# Patient Record
Sex: FEMALE | Race: WHITE | NOT HISPANIC OR LATINO | Employment: FULL TIME | URBAN - METROPOLITAN AREA
[De-identification: names, ages, dates, MRNs, and addresses within clinical notes are randomized per-mention and may not be internally consistent; named-entity substitution may affect disease eponyms.]

---

## 2017-02-16 ENCOUNTER — ALLSCRIPTS OFFICE VISIT (OUTPATIENT)
Dept: OTHER | Facility: OTHER | Age: 52
End: 2017-02-16

## 2017-02-16 DIAGNOSIS — M25.522 PAIN IN LEFT ELBOW: ICD-10-CM

## 2017-02-21 ENCOUNTER — HOSPITAL ENCOUNTER (OUTPATIENT)
Dept: RADIOLOGY | Facility: CLINIC | Age: 52
Discharge: HOME | End: 2017-02-21
Payer: COMMERCIAL

## 2017-02-21 ENCOUNTER — TRANSCRIBE ORDERS (OUTPATIENT)
Dept: URGENT CARE | Facility: CLINIC | Age: 52
End: 2017-02-21

## 2017-02-21 DIAGNOSIS — M25.522 PAIN IN LEFT ELBOW: ICD-10-CM

## 2017-02-21 PROCEDURE — 73030 X-RAY EXAM OF SHOULDER: CPT

## 2017-02-21 PROCEDURE — 73080 X-RAY EXAM OF ELBOW: CPT

## 2017-03-02 ENCOUNTER — ALLSCRIPTS OFFICE VISIT (OUTPATIENT)
Dept: OTHER | Facility: OTHER | Age: 52
End: 2017-03-02

## 2017-03-27 DIAGNOSIS — R06.2 WHEEZING: ICD-10-CM

## 2017-04-07 ENCOUNTER — ALLSCRIPTS OFFICE VISIT (OUTPATIENT)
Dept: OTHER | Facility: OTHER | Age: 52
End: 2017-04-07

## 2017-04-17 ENCOUNTER — HOSPITAL ENCOUNTER (OUTPATIENT)
Dept: RADIOLOGY | Facility: CLINIC | Age: 52
Discharge: HOME | End: 2017-04-17
Payer: COMMERCIAL

## 2017-04-17 ENCOUNTER — TRANSCRIBE ORDERS (OUTPATIENT)
Dept: URGENT CARE | Facility: CLINIC | Age: 52
End: 2017-04-17

## 2017-04-17 DIAGNOSIS — R06.2 WHEEZING: ICD-10-CM

## 2017-04-17 PROCEDURE — 71020 HB CHEST X-RAY 2VW FRONTAL&LATL: CPT

## 2017-10-04 ENCOUNTER — ALLSCRIPTS OFFICE VISIT (OUTPATIENT)
Dept: OTHER | Facility: OTHER | Age: 52
End: 2017-10-04

## 2017-10-05 NOTE — PROGRESS NOTES
Assessment  1  Acute maxillary sinusitis (461 0) (J01 00)    Plan  Acute maxillary sinusitis    · Azithromycin 250 MG Oral Tablet; TAKE 2 TABLETS ON DAY 1 THEN TAKE 1  TABLET A DAY FOR 4 DAYS    Discussion/Summary  The patient was counseled regarding diagnostic results,-instructions for management,-risk factor reductions,-prognosis,-patient and family education,-impressions,-risks and benefits of treatment options,-importance of compliance with treatment  Possible side effects of new medications were reviewed with the patient/guardian today  The treatment plan was reviewed with the patient/guardian  The patient/guardian understands and agrees with the treatment plan      Chief Complaint  congestion, cough and post nasal drip for 1 week, also complains of dizziness      History of Present Illness  HPI: pt complains of 1 week of coughing, sinus pressure, sneezing, productive cough with brown chunks, PND, and dizziness, pt has had ill contacts, pt has tried vicks which did help, pt denies itchy watery eyes, pt denies nausea vomiting or diarrhea, pt did not quit smoking      Review of Systems    Constitutional: fever-and-chills  Respiratory: no shortness of breath-and-no wheezing  Active Problems  1  Acute maxillary sinusitis (461 0) (J01 00)   2  Annual physical exam (V70 0) (Z00 00)   3  Left elbow pain (719 42) (M25 522)   4  Medial epicondylitis, left elbow (726 31) (M77 02)   5  Right shoulder pain (719 41) (M25 511)   6  Wheeze (786 07) (R06 2)    Family History  Father    1  Family history of myocardial infarction (V17 3) (Z82 49)    Social History   · Current every day smoker (305 1) (F17 200)   ·    · Social drinker (V49 89) (Z78 9)  The social history was reviewed and is unchanged  Current Meds   1  Ambien CR 12 5 MG Oral Tablet Extended Release; Therapy: (Recorded:[id number]) to Recorded   2  Lexapro 20 MG Oral Tablet; Therapy: (Recorded:[id number]) to Recorded    Allergies  1   No Known Drug Allergies    Vitals   Recorded: [id number] 09:23AM   Temperature 32 5 F   Systolic 781   Diastolic 82   Height 5 ft 6 in   Weight 141 lb    BMI Calculated 22 76   BSA Calculated 1 72     Physical Exam    Constitutional   General appearance: No acute distress, well appearing and well nourished  well developed,-acutely ill,-well nourished-and-well hydrated  Ears, Nose, Mouth, and Throat   External inspection of ears and nose: Normal     Otoscopic examination: Tympanic membranes translucent with normal light reflex  Canals patent without erythema  Nasal mucosa, septum, and turbinates: Abnormal   There was a purulent discharge from both nares  The bilateral nasal mucosa was boggy  Oropharynx: Abnormal  -cobblestone OP  Pulmonary   Respiratory effort: No increased work of breathing or signs of respiratory distress  Respiratory rate: normal  Assessment of respiratory effort revealed normal rhythm and effort  Auscultation of lungs: Clear to auscultation  no rales or crackles were heard bilaterally  no rhonchi  no friction rub  no wheezing  Cardiovascular   Auscultation of heart: Normal rate and rhythm, normal S1 and S2, without murmurs  The heart rate was normal  The rhythm was regular  Heart sounds: normal S1-and-normal S2  no murmurs were heard  Abdomen   Abdomen: Non-tender, no masses  The abdomen was flat  Bowel sounds were normal  The abdomen was soft and nontender  no masses palpated  The abdomen was normal to percussion  Lymphatic   Palpation of lymph nodes in neck: No lymphadenopathy  Skin   Skin and subcutaneous tissue: Normal without rashes or lesions      Psychiatric   Mood and affect: Normal          Signatures   Electronically signed by : Daniel Jefferson DO; Oct  4 2017  9:34AM EST                       (Author)

## 2017-11-15 ENCOUNTER — ALLSCRIPTS OFFICE VISIT (OUTPATIENT)
Dept: OTHER | Facility: OTHER | Age: 52
End: 2017-11-15

## 2017-11-15 DIAGNOSIS — Z12.11 ENCOUNTER FOR SCREENING FOR MALIGNANT NEOPLASM OF COLON: ICD-10-CM

## 2017-11-17 NOTE — PROGRESS NOTES
Assessment    1  Atypical mole (216 9) (D22 9)    Plan  Screening for colon cancer    · (1) OCCULT BLOOD, FECAL IMMUNOCHEMICAL TEST; Status:Active; Requestedfor:[id number];     Discussion/Summary    No atypical moles found all moles are much smaller than 0 5cm but pt insists on bx which we will schedule  The patient was counseled regarding diagnostic results,-- instructions for management,-- risk factor reductions,-- prognosis,-- patient and family education,-- impressions,-- risks and benefits of treatment options,-- importance of compliance with treatment  The treatment plan was reviewed with the patient/guardian  The patient/guardian understands and agrees with the treatment plan      Chief Complaint  Several spots would like to have looked at      History of Present Illness  pt complains of atypical moles on her back noticed about over a month ago, pt has a history of melenoma, and sees a dermatologist but they cannot get her in for 6 months      Review of Systems   Constitutional: no recent weight loss  Respiratory: no shortness of breath-- and-- no wheezing  Active Problems  1  Acute maxillary sinusitis (461 0) (J01 00)   2  Annual physical exam (V70 0) (Z00 00)   3  Left elbow pain (719 42) (M25 522)   4  Medial epicondylitis, left elbow (726 31) (M77 02)   5  Right shoulder pain (719 41) (M25 511)   6  Wheeze (786 07) (R06 2)    Family History  Father    1  Family history of myocardial infarction (V17 3) (Z82 49)    Social History     · Current every day smoker (305 1) (F17 200)   ·    · Social drinker (V49 89) (Z78 9)  The social history was reviewed and is unchanged  Current Meds   1  Ambien CR 12 5 MG Oral Tablet Extended Release; Therapy: (Recorded:[id number]) to Recorded   2  Lexapro 20 MG Oral Tablet; Therapy: (Recorded:[id number]) to Recorded    The medication list was reviewed and updated today  Allergies  1   No Known Drug Allergies    Vitals  Vital Signs    Recorded: 27GRL6494 09:32AM   Temperature 98 2 F   Heart Rate 94   Systolic 017   Diastolic 92   Height 5 ft 6 in   Weight 151 lb    BMI Calculated 24 37   BSA Calculated 1 77       Physical Exam   Constitutional  General appearance: No acute distress, well appearing and well nourished  well developed,-- appears healthy,-- well nourished-- and-- well hydrated  Pulmonary  Respiratory effort: No increased work of breathing or signs of respiratory distress  Respiratory rate: normal  Assessment of respiratory effort revealed normal rhythm and effort  Auscultation of lungs: Clear to auscultation  no rales or crackles were heard bilaterally  no rhonchi  no friction rub  no wheezing  Cardiovascular  Auscultation of heart: Normal rate and rhythm, normal S1 and S2, without murmurs  The heart rate was normal  The rhythm was regular  Heart sounds: normal S1-- and-- normal S2  no murmurs were heard  Abdomen  Abdomen: Non-tender, no masses  Lymphatic  Palpation of lymph nodes in neck: No lymphadenopathy  Skin  Skin and subcutaneous tissue: Normal without rashes or lesions     Psychiatric  Mood and affect: Normal          Future Appointments    Date/Time Provider Specialty Site   12/13/2017 09:00 AM Gemini Poon DO Family Medicine [facility] 10       Signatures   Electronically signed by : Antoni Cooper DO; Nov 16 2017  4:55PM EST                       (Author)

## 2018-01-13 VITALS
DIASTOLIC BLOOD PRESSURE: 80 MMHG | HEIGHT: 66 IN | WEIGHT: 144.2 LBS | BODY MASS INDEX: 23.18 KG/M2 | TEMPERATURE: 97 F | SYSTOLIC BLOOD PRESSURE: 132 MMHG

## 2018-01-13 VITALS
SYSTOLIC BLOOD PRESSURE: 140 MMHG | WEIGHT: 141 LBS | TEMPERATURE: 96.9 F | BODY MASS INDEX: 22.66 KG/M2 | HEIGHT: 66 IN | DIASTOLIC BLOOD PRESSURE: 82 MMHG

## 2018-01-14 VITALS
DIASTOLIC BLOOD PRESSURE: 68 MMHG | TEMPERATURE: 97.4 F | SYSTOLIC BLOOD PRESSURE: 122 MMHG | WEIGHT: 143 LBS | BODY MASS INDEX: 22.98 KG/M2 | HEIGHT: 66 IN

## 2018-01-14 VITALS
SYSTOLIC BLOOD PRESSURE: 135 MMHG | HEART RATE: 94 BPM | BODY MASS INDEX: 24.27 KG/M2 | WEIGHT: 151 LBS | TEMPERATURE: 98.2 F | HEIGHT: 66 IN | DIASTOLIC BLOOD PRESSURE: 92 MMHG

## 2018-03-12 DIAGNOSIS — J01.00 ACUTE NON-RECURRENT MAXILLARY SINUSITIS: Primary | ICD-10-CM

## 2018-03-12 RX ORDER — AZITHROMYCIN 500 MG/1
TABLET, FILM COATED ORAL
Qty: 6 TABLET | Refills: 0 | Status: SHIPPED | OUTPATIENT
Start: 2018-03-12 | End: 2018-03-17

## 2018-09-27 ENCOUNTER — OFFICE VISIT (OUTPATIENT)
Dept: FAMILY MEDICINE CLINIC | Facility: CLINIC | Age: 53
End: 2018-09-27
Payer: COMMERCIAL

## 2018-09-27 VITALS
SYSTOLIC BLOOD PRESSURE: 126 MMHG | BODY MASS INDEX: 25.58 KG/M2 | HEIGHT: 67 IN | TEMPERATURE: 96.5 F | HEART RATE: 85 BPM | OXYGEN SATURATION: 95 % | WEIGHT: 163 LBS | RESPIRATION RATE: 16 BRPM | DIASTOLIC BLOOD PRESSURE: 80 MMHG

## 2018-09-27 DIAGNOSIS — Z13.6 SCREENING FOR ISCHEMIC HEART DISEASE: ICD-10-CM

## 2018-09-27 DIAGNOSIS — F32.A DEPRESSIVE DISORDER: ICD-10-CM

## 2018-09-27 DIAGNOSIS — J01.01 ACUTE RECURRENT MAXILLARY SINUSITIS: Primary | ICD-10-CM

## 2018-09-27 DIAGNOSIS — Z13.0 SCREENING FOR IRON DEFICIENCY ANEMIA: ICD-10-CM

## 2018-09-27 DIAGNOSIS — Z09 EXAMINATION FOR, FOLLOW-UP: ICD-10-CM

## 2018-09-27 DIAGNOSIS — Z13.220 SCREENING FOR LIPOID DISORDERS: ICD-10-CM

## 2018-09-27 DIAGNOSIS — F32.A DEPRESSION, UNSPECIFIED DEPRESSION TYPE: Primary | ICD-10-CM

## 2018-09-27 DIAGNOSIS — G47.09 OTHER INSOMNIA: ICD-10-CM

## 2018-09-27 PROBLEM — J01.90 ACUTE SINUSITIS: Status: ACTIVE | Noted: 2018-09-27

## 2018-09-27 PROBLEM — M77.02 MEDIAL EPICONDYLITIS, LEFT ELBOW: Status: ACTIVE | Noted: 2017-02-16

## 2018-09-27 PROCEDURE — 3008F BODY MASS INDEX DOCD: CPT | Performed by: FAMILY MEDICINE

## 2018-09-27 PROCEDURE — 99213 OFFICE O/P EST LOW 20 MIN: CPT | Performed by: FAMILY MEDICINE

## 2018-09-27 RX ORDER — AZITHROMYCIN 250 MG/1
TABLET, FILM COATED ORAL
Qty: 6 TABLET | Refills: 0 | Status: SHIPPED | OUTPATIENT
Start: 2018-09-27 | End: 2018-10-02

## 2018-09-27 RX ORDER — ESCITALOPRAM OXALATE 20 MG/1
20 TABLET ORAL DAILY
Qty: 90 TABLET | Refills: 0 | Status: CANCELLED | OUTPATIENT
Start: 2018-09-27

## 2018-09-27 RX ORDER — FLUTICASONE PROPIONATE 50 MCG
2 SPRAY, SUSPENSION (ML) NASAL DAILY
Qty: 16 G | Refills: 0 | Status: SHIPPED | OUTPATIENT
Start: 2018-09-27 | End: 2018-10-24 | Stop reason: SDUPTHER

## 2018-09-27 RX ORDER — MECLIZINE HYDROCHLORIDE 25 MG/1
TABLET ORAL AS NEEDED
COMMUNITY
Start: 2015-03-10 | End: 2019-01-24 | Stop reason: ALTCHOICE

## 2018-09-27 RX ORDER — ESCITALOPRAM OXALATE 20 MG/1
20 TABLET ORAL DAILY
Qty: 90 TABLET | Refills: 0 | Status: SHIPPED | OUTPATIENT
Start: 2018-09-27 | End: 2018-12-27 | Stop reason: SDUPTHER

## 2018-09-27 RX ORDER — ZOLPIDEM TARTRATE 12.5 MG/1
TABLET, FILM COATED, EXTENDED RELEASE ORAL
COMMUNITY
Start: 2017-05-24 | End: 2018-09-27 | Stop reason: SDUPTHER

## 2018-09-27 RX ORDER — ZOLPIDEM TARTRATE 12.5 MG/1
12.5 TABLET, FILM COATED, EXTENDED RELEASE ORAL
Qty: 30 TABLET | Refills: 0 | Status: SHIPPED | OUTPATIENT
Start: 2018-09-27 | End: 2019-01-24 | Stop reason: ALTCHOICE

## 2018-09-27 RX ORDER — ESCITALOPRAM OXALATE 20 MG/1
20 TABLET ORAL DAILY
COMMUNITY
Start: 2018-03-05 | End: 2018-09-27 | Stop reason: SDUPTHER

## 2018-09-27 RX ORDER — TERBINAFINE HYDROCHLORIDE 250 MG/1
TABLET ORAL EVERY 24 HOURS
COMMUNITY
Start: 2018-04-03 | End: 2019-10-17 | Stop reason: SDUPTHER

## 2018-09-27 NOTE — ASSESSMENT & PLAN NOTE
Acute we discussed supportive treatment with the patient and than 90% of sinus infection is viral if patient symptom has been going on for more than 2 week plan to start her on Zithromax as directed on the pack  Rinse nose and sinuses with salt few times a day  Take over-the-counter med pain relief for  Use the congestion noted spray

## 2018-09-27 NOTE — PROGRESS NOTES
Assessment/Plan:    Acute sinusitis  Acute we discussed supportive treatment with the patient and than 90% of sinus infection is viral if patient symptom has been going on for more than 2 week plan to start her on Zithromax as directed on the pack  Rinse nose and sinuses with salt few times a day  Take over-the-counter med pain relief for  Use the congestion noted spray       Diagnoses and all orders for this visit:    Acute recurrent maxillary sinusitis  -     azithromycin (ZITHROMAX) 250 mg tablet; Take 2 tablets 1st day for 1 tablet daily for 4 the  -     fluticasone (FLONASE) 50 mcg/act nasal spray; 2 sprays into each nostril daily for 14 days    Other insomnia  -     zolpidem (AMBIEN CR) 12 5 MG CR tablet; Take 1 tablet (12 5 mg total) by mouth daily at bedtime as needed for sleep    Depressive disorder  -     escitalopram (LEXAPRO) 20 mg tablet; Take 1 tablet (20 mg total) by mouth daily    Screening for lipoid disorders  -     CBC and differential; Future  -     Comprehensive metabolic panel; Future  -     Lipid panel; Future  -     TSH baseline; Future    Screening for ischemic heart disease  -     CBC and differential; Future  -     Comprehensive metabolic panel; Future  -     Lipid panel; Future  -     TSH baseline; Future    Screening for iron deficiency anemia  -     CBC and differential; Future  -     Comprehensive metabolic panel; Future  -     Lipid panel; Future  -     TSH baseline; Future    Examination for, follow-up  -     CBC and differential; Future  -     Comprehensive metabolic panel; Future  -     Lipid panel; Future  -     TSH baseline;  Future    Other orders  -     meclizine (ANTIVERT) 25 mg tablet; as needed  -     Discontinue: zolpidem (AMBIEN CR) 12 5 MG CR tablet; take 1 tablet (12 5MG)  by oral route  every day at bedtime  -     terbinafine (LAMISIL) 250 mg tablet; every 24 hours          Subjective:   Chief Complaint   Patient presents with    Cold Like Symptoms     x11 days Patient ID: Ping Guzman is a 48 y o  female  Patient here concerned about the sinus pressure facial pain and headache congestion postnasal drip the tired fatigue the chills subjective fever it has been going on for almost 2 week patient try over-the-counter medication for anti congestion she has been increasing her fluid intake but is not helping patient is a smoker        The following portions of the patient's history were reviewed and updated as appropriate: allergies, current medications, past family history, past medical history, past social history, past surgical history and problem list     Review of Systems   Constitutional: Negative for fatigue and fever  HENT: Positive for sinus pain and sinus pressure  Negative for ear pain and sore throat  Eyes: Negative for pain and redness  Respiratory: Negative for cough, chest tightness and shortness of breath  Cardiovascular: Negative for chest pain, palpitations and leg swelling  Gastrointestinal: Negative for abdominal pain, blood in stool, constipation, diarrhea and nausea  Genitourinary: Negative for flank pain, frequency and hematuria  Musculoskeletal: Negative for back pain and joint swelling  Skin: Negative for rash  Neurological: Negative for dizziness, numbness and headaches  Hematological: Does not bruise/bleed easily  Objective:  Vitals:    09/27/18 1334   BP: 126/80   BP Location: Left arm   Patient Position: Sitting   Cuff Size: Standard   Pulse: 85   Resp: 16   Temp: (!) 96 5 °F (35 8 °C)   TempSrc: Oral   SpO2: 95%   Weight: 73 9 kg (163 lb)   Height: 5' 6 5" (1 689 m)      Physical Exam   Constitutional: She is oriented to person, place, and time  She appears well-developed and well-nourished  HENT:   Head: Normocephalic  Right Ear: External ear normal    Left Ear: External ear normal    Eyes: Conjunctivae and EOM are normal  Right eye exhibits no discharge  Left eye exhibits no discharge  Neck: No JVD present  Cardiovascular: Normal rate, regular rhythm and normal heart sounds  Exam reveals no gallop  No murmur heard  Pulmonary/Chest: Effort normal  No respiratory distress  She has no rales  She exhibits no tenderness  Abdominal: She exhibits no mass  There is no tenderness  There is no rebound  Musculoskeletal: She exhibits no edema or tenderness  Neurological: She is alert and oriented to person, place, and time  Skin: No rash noted  No erythema

## 2018-10-01 ENCOUNTER — TELEPHONE (OUTPATIENT)
Dept: FAMILY MEDICINE CLINIC | Facility: CLINIC | Age: 53
End: 2018-10-01

## 2018-10-01 DIAGNOSIS — R06.02 SOB (SHORTNESS OF BREATH): Primary | ICD-10-CM

## 2018-10-01 RX ORDER — ALBUTEROL SULFATE 90 UG/1
2 AEROSOL, METERED RESPIRATORY (INHALATION) EVERY 6 HOURS PRN
Qty: 18 G | Refills: 1 | OUTPATIENT
Start: 2018-10-01 | End: 2019-02-26 | Stop reason: SDUPTHER

## 2018-10-01 NOTE — TELEPHONE ENCOUNTER
Incoming voicemail 805 am on nurses line patient seen on Thursday given antibiotic which she finished there is no improvement still wheezng congested and SOB tried mucinex and robatussin requesting inhaler or cough medicine please advise

## 2018-10-01 NOTE — TELEPHONE ENCOUNTER
Spoke to patient called in rx for ventolin inhaler to pharmacy in [place] spoke to [facility]

## 2018-10-17 ENCOUNTER — APPOINTMENT (OUTPATIENT)
Dept: LAB | Facility: HOSPITAL | Age: 53
End: 2018-10-17
Payer: COMMERCIAL

## 2018-10-17 DIAGNOSIS — Z13.6 SCREENING FOR ISCHEMIC HEART DISEASE: ICD-10-CM

## 2018-10-17 DIAGNOSIS — Z13.220 SCREENING FOR LIPOID DISORDERS: ICD-10-CM

## 2018-10-17 DIAGNOSIS — Z13.0 SCREENING FOR IRON DEFICIENCY ANEMIA: ICD-10-CM

## 2018-10-17 DIAGNOSIS — Z09 EXAMINATION FOR, FOLLOW-UP: ICD-10-CM

## 2018-10-17 LAB
ALBUMIN SERPL BCP-MCNC: 4 G/DL (ref 3.5–5.7)
ALP SERPL-CCNC: 50 U/L (ref 40–150)
ALT SERPL W P-5'-P-CCNC: 14 U/L (ref 7–52)
ANION GAP SERPL CALCULATED.3IONS-SCNC: 6 MMOL/L (ref 4–13)
AST SERPL W P-5'-P-CCNC: 15 U/L (ref 13–39)
BASOPHILS # BLD AUTO: 0.1 THOUSANDS/ΜL (ref 0–0.1)
BASOPHILS NFR BLD AUTO: 1 % (ref 0–2)
BILIRUB SERPL-MCNC: 0.2 MG/DL (ref 0.2–1)
BUN SERPL-MCNC: 20 MG/DL (ref 7–25)
CALCIUM SERPL-MCNC: 9 MG/DL (ref 8.6–10.5)
CHLORIDE SERPL-SCNC: 107 MMOL/L (ref 98–107)
CHOLEST SERPL-MCNC: 161 MG/DL (ref 0–200)
CO2 SERPL-SCNC: 27 MMOL/L (ref 21–31)
CREAT SERPL-MCNC: 0.69 MG/DL (ref 0.6–1.2)
EOSINOPHIL # BLD AUTO: 0.3 THOUSAND/ΜL (ref 0–0.61)
EOSINOPHIL NFR BLD AUTO: 5 % (ref 0–5)
ERYTHROCYTE [DISTWIDTH] IN BLOOD BY AUTOMATED COUNT: 13.1 % (ref 11.5–14.5)
GFR SERPL CREATININE-BSD FRML MDRD: 101 ML/MIN/1.73SQ M
GLUCOSE P FAST SERPL-MCNC: 93 MG/DL (ref 65–99)
HCT VFR BLD AUTO: 42.8 % (ref 34.8–46.1)
HDLC SERPL-MCNC: 50 MG/DL (ref 40–60)
HGB BLD-MCNC: 13.8 G/DL (ref 12–16)
LDLC SERPL CALC-MCNC: 93 MG/DL (ref 75–193)
LYMPHOCYTES # BLD AUTO: 2 THOUSANDS/ΜL (ref 0.6–4.47)
LYMPHOCYTES NFR BLD AUTO: 32 % (ref 21–51)
MCH RBC QN AUTO: 29.4 PG (ref 26–34)
MCHC RBC AUTO-ENTMCNC: 32.2 G/DL (ref 31–37)
MCV RBC AUTO: 91 FL (ref 81–99)
MONOCYTES # BLD AUTO: 0.4 THOUSAND/ΜL (ref 0.17–1.22)
MONOCYTES NFR BLD AUTO: 7 % (ref 2–12)
NEUTROPHILS # BLD AUTO: 3.5 THOUSANDS/ΜL (ref 1.4–6.5)
NEUTS SEG NFR BLD AUTO: 55 % (ref 42–75)
NONHDLC SERPL-MCNC: 111 MG/DL
NRBC BLD AUTO-RTO: 0 /100 WBCS
PLATELET # BLD AUTO: 288 THOUSANDS/UL (ref 149–390)
PMV BLD AUTO: 9.5 FL (ref 8.6–11.7)
POTASSIUM SERPL-SCNC: 4.1 MMOL/L (ref 3.5–5.5)
PROT SERPL-MCNC: 6.2 G/DL (ref 6.4–8.9)
RBC # BLD AUTO: 4.7 MILLION/UL (ref 3.9–5.2)
SODIUM SERPL-SCNC: 140 MMOL/L (ref 134–143)
TRIGL SERPL-MCNC: 88 MG/DL (ref 44–166)
TSH SERPL DL<=0.05 MIU/L-ACNC: 2.72 UIU/ML (ref 0.45–5.33)
WBC # BLD AUTO: 6.4 THOUSAND/UL (ref 4.8–10.8)

## 2018-10-17 PROCEDURE — 36415 COLL VENOUS BLD VENIPUNCTURE: CPT

## 2018-10-17 PROCEDURE — 85025 COMPLETE CBC W/AUTO DIFF WBC: CPT

## 2018-10-17 PROCEDURE — 80061 LIPID PANEL: CPT

## 2018-10-17 PROCEDURE — 80053 COMPREHEN METABOLIC PANEL: CPT

## 2018-10-17 PROCEDURE — 84443 ASSAY THYROID STIM HORMONE: CPT

## 2018-10-23 ENCOUNTER — OFFICE VISIT (OUTPATIENT)
Dept: FAMILY MEDICINE CLINIC | Facility: CLINIC | Age: 53
End: 2018-10-23
Payer: COMMERCIAL

## 2018-10-23 VITALS
WEIGHT: 165 LBS | SYSTOLIC BLOOD PRESSURE: 160 MMHG | BODY MASS INDEX: 26.52 KG/M2 | DIASTOLIC BLOOD PRESSURE: 100 MMHG | OXYGEN SATURATION: 90 % | TEMPERATURE: 97.9 F | HEIGHT: 66 IN | HEART RATE: 62 BPM

## 2018-10-23 DIAGNOSIS — Z12.31 ENCOUNTER FOR MAMMOGRAM TO ESTABLISH BASELINE MAMMOGRAM: ICD-10-CM

## 2018-10-23 DIAGNOSIS — Z01.419 CERVICAL SMEAR, AS PART OF ROUTINE GYNECOLOGICAL EXAMINATION: Primary | ICD-10-CM

## 2018-10-23 DIAGNOSIS — R03.0 ELEVATED BP WITHOUT DIAGNOSIS OF HYPERTENSION: ICD-10-CM

## 2018-10-23 DIAGNOSIS — Z23 NEED FOR INFLUENZA VACCINATION: ICD-10-CM

## 2018-10-23 PROCEDURE — 99396 PREV VISIT EST AGE 40-64: CPT | Performed by: FAMILY MEDICINE

## 2018-10-23 PROCEDURE — 87624 HPV HI-RISK TYP POOLED RSLT: CPT | Performed by: FAMILY MEDICINE

## 2018-10-23 PROCEDURE — G0145 SCR C/V CYTO,THINLAYER,RESCR: HCPCS | Performed by: FAMILY MEDICINE

## 2018-10-23 RX ORDER — VALACYCLOVIR HYDROCHLORIDE 1 G/1
TABLET, FILM COATED ORAL
Refills: 3 | COMMUNITY
Start: 2018-10-16

## 2018-10-23 NOTE — PROGRESS NOTES
Blas Espinal He is a 48 y o  female here for a routine gynecologic exam       Current complaints:   Patient his here for her annual Pap smear the per patient she is in no sexual active currently and she did have a Pap smear on 2014 and it was normal     Gynecologic History  Patient's last menstrual period was 10/12/2018  Contraception: none  Last Pap: 12/4/14  Results were: normal  Last mammogram: 11/13/13  Results were: normal  Last colonoscopy: never  And she declined for now she says she will think about it    Obstetric History  OB History        The following portions of the patient's history were reviewed and updated as appropriate: allergies, current medications, past family history, past medical history, past social history, past surgical history and problem list     Review of Systems   Constitutional: Negative for fatigue and fever  HENT: Negative for ear pain, sinus pain, sinus pressure and sore throat  Eyes: Negative for pain and redness  Respiratory: Negative for cough, chest tightness and shortness of breath  Cardiovascular: Negative for chest pain, palpitations and leg swelling  Gastrointestinal: Negative for abdominal pain, blood in stool, constipation, diarrhea and nausea  Genitourinary: Negative for decreased urine volume, flank pain, frequency, hematuria, pelvic pain, vaginal bleeding, vaginal discharge and vaginal pain  Musculoskeletal: Negative for back pain and joint swelling  Skin: Negative for rash  Neurological: Negative for dizziness, numbness and headaches  Hematological: Does not bruise/bleed easily  Objective    Physical Exam   Constitutional: She is oriented to person, place, and time  She appears well-developed  HENT:   Right Ear: External ear normal    Left Ear: External ear normal    Eyes: Pupils are equal, round, and reactive to light  EOM are normal    Neck: No thyromegaly present     Cardiovascular: Normal rate, regular rhythm and normal heart sounds  Exam reveals no friction rub  No murmur heard  Pulmonary/Chest: She has no wheezes  She has no rales  She exhibits no tenderness  Abdominal: She exhibits no distension and no mass  There is no tenderness  No hernia  Hernia confirmed negative in the right inguinal area and confirmed negative in the left inguinal area  Genitourinary: There is no rash on the right labia  There is no rash on the left labia  Cervix exhibits no motion tenderness  Right adnexum displays no mass and no tenderness  Left adnexum displays no mass and no tenderness  No tenderness or bleeding in the vagina  No foreign body in the vagina  No vaginal discharge found  Musculoskeletal: Normal range of motion  She exhibits no edema  Lymphadenopathy:        Right: No inguinal adenopathy present  Left: No inguinal adenopathy present  Neurological: She is alert and oriented to person, place, and time  Skin: Skin is warm  Assessment     Healthy female exam       Plan  Encounter Diagnosis     ICD-10-CM    1  Cervical smear, as part of routine gynecological examination Z01 419 Liquid-based pap, screening     CANCELED: Thinprep Pap   2  Encounter for mammogram to establish baseline mammogram Z12 31 Mammo screening bilateral w cad   3  Elevated BP without diagnosis of hypertension R03 0    4  Need for influenza vaccination Z23 influenza vaccine, 2065-2802, quadrivalent, recombinant, PF, 0 5 mL, for patients 18-49 yr with comorbidities (FLUBLOK)      Education reviewed: calcium supplements, depression evaluation, low fat, low cholesterol diet and smoking cessation

## 2018-10-23 NOTE — ASSESSMENT & PLAN NOTE
Elevated blood pressure asymptomatic patient did smoke before come to the office we discussed with the patient low-salt diet will recheck blood pressure if persistent to be high will start her on medication

## 2018-10-24 DIAGNOSIS — J01.01 ACUTE RECURRENT MAXILLARY SINUSITIS: ICD-10-CM

## 2018-10-25 LAB
HPV HR 12 DNA CVX QL NAA+PROBE: NEGATIVE
HPV16 DNA CVX QL NAA+PROBE: NEGATIVE
HPV18 DNA CVX QL NAA+PROBE: NEGATIVE

## 2018-10-26 RX ORDER — FLUTICASONE PROPIONATE 50 MCG
2 SPRAY, SUSPENSION (ML) NASAL DAILY
Qty: 16 G | Refills: 0 | Status: SHIPPED | OUTPATIENT
Start: 2018-10-26 | End: 2020-07-31 | Stop reason: ALTCHOICE

## 2018-10-30 ENCOUNTER — OFFICE VISIT (OUTPATIENT)
Dept: FAMILY MEDICINE CLINIC | Facility: CLINIC | Age: 53
End: 2018-10-30
Payer: COMMERCIAL

## 2018-10-30 VITALS
WEIGHT: 168.8 LBS | HEIGHT: 66 IN | SYSTOLIC BLOOD PRESSURE: 142 MMHG | BODY MASS INDEX: 27.13 KG/M2 | DIASTOLIC BLOOD PRESSURE: 88 MMHG

## 2018-10-30 DIAGNOSIS — R03.0 ELEVATED BP WITHOUT DIAGNOSIS OF HYPERTENSION: ICD-10-CM

## 2018-10-30 DIAGNOSIS — C43.59 MALIGNANT MELANOMA OF SKIN OF TRUNK (HCC): ICD-10-CM

## 2018-10-30 DIAGNOSIS — Z12.39 SCREENING FOR BREAST CANCER: ICD-10-CM

## 2018-10-30 DIAGNOSIS — F32.9 MAJOR DEPRESSIVE DISORDER WITH SINGLE EPISODE, REMISSION STATUS UNSPECIFIED: Primary | ICD-10-CM

## 2018-10-30 DIAGNOSIS — R06.2 WHEEZING: ICD-10-CM

## 2018-10-30 LAB
LAB AP GYN PRIMARY INTERPRETATION: NORMAL
LAB AP LMP: NORMAL
Lab: NORMAL

## 2018-10-30 PROCEDURE — 99214 OFFICE O/P EST MOD 30 MIN: CPT | Performed by: NURSE PRACTITIONER

## 2018-10-30 RX ORDER — FLUTICASONE FUROATE AND VILANTEROL 100; 25 UG/1; UG/1
1 POWDER RESPIRATORY (INHALATION) DAILY
Qty: 1 INHALER | Refills: 1 | Status: SHIPPED | COMMUNITY
Start: 2018-10-30 | End: 2020-12-08

## 2018-10-30 RX ORDER — METHYLPREDNISOLONE 4 MG/1
TABLET ORAL
Qty: 21 TABLET | Refills: 0 | Status: SHIPPED | OUTPATIENT
Start: 2018-10-30 | End: 2018-11-27 | Stop reason: ALTCHOICE

## 2018-10-30 NOTE — PROGRESS NOTES
Assessment/Plan:    No problem-specific Assessment & Plan notes found for this encounter  Diagnoses and all orders for this visit:    Major depressive disorder with single episode, remission status unspecified  Comments:  Pt using Lexapro with satisfaction    Elevated BP without diagnosis of hypertension  Comments:  bp 142/88 today no medication at this time    Screening for breast cancer  Comments:  mammogram ordered  Orders:  -     Mammo screening bilateral w cad; Future    Malignant melanoma of skin of trunk (HCC)  Comments:  pt follows with [facility]- had full body scan 10/18    Wheezing  Comments:  Rx for Medrol dose pack and Breo provided pt already has the Albuterol  Orders:  -     Methylprednisolone 4 MG TBPK; Use as directed on package  -     fluticasone-vilanterol (BREO ELLIPTA) 100-25 mcg/inh inhaler; Inhale 1 puff daily Rinse mouth after use  Subjective:      Patient ID: Rolando Lares is a 48 y o  female here for lab review     48 yr old female here for review of labwork and pap smear results  Pt is re-establishing care at this office  Pt reports her only complaint today is generalized wheezing and loose non productive cough since September 2018  Pt denies fever but admittedly smokes 1 ppd  Labwork and pap exam  reviewed  no abnormalities noted  Pt is also requesting testing for ADHD at a future time  Pt reports she has so much difficulty concentrating and remaining calm especially at time of sleep  Pt has tried multiple sleeping agents and still has insomnia  Pt would like to be tested for ADHD, we discussed need for subsequent appt  The following portions of the patient's history were reviewed and updated as appropriate:   She  has a past medical history of Anxiety (5/31/2013); Arthritis; Cancer ([facility] [address] ); Herpes simplex; IBS (irritable bowel syndrome); Insomnia; Malignant melanoma in situ ([facility] [address] ); and Reactive airway disease    She   Patient Active Problem List Diagnosis Date Noted    Elevated BP without diagnosis of hypertension 10/23/2018    Acute sinusitis 09/27/2018    Medial epicondylitis, left elbow 02/16/2017    Major depression, single episode 08/11/2016    Constipation 11/03/2014    Depressive disorder 11/03/2014    Malignant melanoma of skin of trunk ([facility] [address] ) 11/03/2014    Insomnia 05/31/2013    Irritable bowel syndrome 05/31/2013    Anxiety 05/31/2013     She  has a past surgical history that includes Skin lesion excision (Right, 2013); AUGMENTATION BREAST; and Skin biopsy (2013)  Her family history includes Diabetes in her other; Heart disease in her father; Hyperlipidemia in her other  She  reports that she has been smoking Cigarettes  She has been smoking about 1 00 pack per day  She uses smokeless tobacco  She reports that she drinks alcohol  She reports that she does not use drugs  Current Outpatient Prescriptions   Medication Sig Dispense Refill    albuterol (VENTOLIN HFA) 90 mcg/act inhaler Inhale 2 puffs every 6 (six) hours as needed for wheezing 18 g 1    escitalopram (LEXAPRO) 20 mg tablet Take 1 tablet (20 mg total) by mouth daily 90 tablet 0    fluticasone (FLONASE) 50 mcg/act nasal spray 2 sprays into each nostril daily 16 g 0    meclizine (ANTIVERT) 25 mg tablet as needed      PREVIDENT 5000 SENSITIVE 1 1-5 % PSTE BRUSH 1 TIME A DAY BEFORE BED  5    terbinafine (LAMISIL) 250 mg tablet every 24 hours      valACYclovir (VALTREX) 1,000 mg tablet TAKE TWO TABS BY MOUTH AT ONSET, THEN TWO TABS 12 HRS  LATER  3    zolpidem (AMBIEN CR) 12 5 MG CR tablet Take 1 tablet (12 5 mg total) by mouth daily at bedtime as needed for sleep 30 tablet 0    fluticasone-vilanterol (BREO ELLIPTA) 100-25 mcg/inh inhaler Inhale 1 puff daily Rinse mouth after use  1 Inhaler 1    Methylprednisolone 4 MG TBPK Use as directed on package 21 tablet 0     No current facility-administered medications for this visit        Current Outpatient Prescriptions on File Prior to Visit   Medication Sig    albuterol (VENTOLIN HFA) 90 mcg/act inhaler Inhale 2 puffs every 6 (six) hours as needed for wheezing    escitalopram (LEXAPRO) 20 mg tablet Take 1 tablet (20 mg total) by mouth daily    fluticasone (FLONASE) 50 mcg/act nasal spray 2 sprays into each nostril daily    meclizine (ANTIVERT) 25 mg tablet as needed    PREVIDENT 5000 SENSITIVE 1 1-5 % PSTE BRUSH 1 TIME A DAY BEFORE BED    terbinafine (LAMISIL) 250 mg tablet every 24 hours    valACYclovir (VALTREX) 1,000 mg tablet TAKE TWO TABS BY MOUTH AT ONSET, THEN TWO TABS 12 HRS  LATER    zolpidem (AMBIEN CR) 12 5 MG CR tablet Take 1 tablet (12 5 mg total) by mouth daily at bedtime as needed for sleep     No current facility-administered medications on file prior to visit  She is allergic to vilazodone; zolpidem; bacitracin; neomycin; and polymyxin b       Review of Systems   Constitutional: Negative for fatigue  HENT: Negative for congestion, postnasal drip, rhinorrhea, sinus pain, sore throat and trouble swallowing  Eyes: Negative for pain and visual disturbance  Respiratory: Positive for cough and wheezing  Negative for shortness of breath  Cardiovascular: Negative for chest pain and palpitations  Gastrointestinal: Negative for constipation, diarrhea, nausea and vomiting  Endocrine: Negative for polydipsia, polyphagia and polyuria  Genitourinary: Negative for difficulty urinating, flank pain, frequency and pelvic pain  Musculoskeletal: Negative for arthralgias, back pain, joint swelling and myalgias  Skin: Negative  Negative for color change and rash  Neurological: Negative for dizziness, syncope, weakness, light-headedness, numbness and headaches  Hematological: Negative for adenopathy  Does not bruise/bleed easily  Psychiatric/Behavioral: Negative for behavioral problems and sleep disturbance  The patient is not nervous/anxious            Objective:      /88 (BP Location: Left arm, Patient Position: Sitting, Cuff Size: Adult)   Ht 5' 6 25" (1 683 m)   Wt 76 6 kg (168 lb 12 8 oz)   LMP 10/12/2018   BMI 27 04 kg/m²          Physical Exam   Constitutional: She is oriented to person, place, and time  She appears well-developed and well-nourished  HENT:   Head: Normocephalic  Eyes: Pupils are equal, round, and reactive to light  Neck: Normal range of motion  Cardiovascular: Normal rate and regular rhythm  Pulmonary/Chest: Effort normal  She has wheezes in the right upper field, the right middle field, the right lower field, the left upper field, the left middle field and the left lower field  Abdominal: Soft  Bowel sounds are normal    Musculoskeletal: Normal range of motion  Neurological: She is alert and oriented to person, place, and time  Skin: Skin is warm and dry  Psychiatric: She has a normal mood and affect  Her behavior is normal  Judgment and thought content normal    Nursing note and vitals reviewed

## 2018-11-27 ENCOUNTER — OFFICE VISIT (OUTPATIENT)
Dept: FAMILY MEDICINE CLINIC | Facility: CLINIC | Age: 53
End: 2018-11-27
Payer: COMMERCIAL

## 2018-11-27 VITALS
HEART RATE: 70 BPM | BODY MASS INDEX: 27.19 KG/M2 | WEIGHT: 169.2 LBS | HEIGHT: 66 IN | SYSTOLIC BLOOD PRESSURE: 126 MMHG | DIASTOLIC BLOOD PRESSURE: 82 MMHG

## 2018-11-27 DIAGNOSIS — E66.3 OVERWEIGHT (BMI 25.0-29.9): ICD-10-CM

## 2018-11-27 DIAGNOSIS — F41.9 ANXIETY: Primary | ICD-10-CM

## 2018-11-27 DIAGNOSIS — F90.0 ATTENTION DEFICIT HYPERACTIVITY DISORDER (ADHD), PREDOMINANTLY INATTENTIVE TYPE: ICD-10-CM

## 2018-11-27 PROCEDURE — 80307 DRUG TEST PRSMV CHEM ANLYZR: CPT | Performed by: NURSE PRACTITIONER

## 2018-11-27 PROCEDURE — 99214 OFFICE O/P EST MOD 30 MIN: CPT | Performed by: NURSE PRACTITIONER

## 2018-11-27 NOTE — PROGRESS NOTES
Assessment/Plan:    No problem-specific Assessment & Plan notes found for this encounter  Diagnoses and all orders for this visit:    Anxiety  Comments:  Pt currently using Lexapro and reports decrease in anxiety   Orders:  -     lisdexamfetamine (VYVANSE) 30 MG capsule; Take 1 capsule (30 mg total) by mouth every morning Max Daily Amount: 30 mg    Attention deficit hyperactivity disorder (ADHD), predominantly inattentive type  Comments:  Adult onset ADHD scale score elevated, Mood disorder Questionnarie completed also elevated  Orders:  -     lisdexamfetamine (VYVANSE) 30 MG capsule; Take 1 capsule (30 mg total) by mouth every morning Max Daily Amount: 30 mg  -     Toxicology screen, urine    Overweight (BMI 25 0-29  9)  Comments:  Pt started on Vyvance- side affects reviewed          Subjective:      Patient ID: Rose Mary Hinton is a 48 y o  female  48 yr old female is here to discuss concerns about history of anxiety  Pt reports she has long standing hx of anxiety and is concerned she may have ADHD  Pt did complete the Mood disorder Questionnaire and the Adult ADHD Self Report Scale  UDS and substance abuse contract completed  The following portions of the patient's history were reviewed and updated as appropriate:   She  has a past medical history of Anxiety (5/31/2013); Arthritis; Cancer ([address] ); Herpes simplex; IBS (irritable bowel syndrome); Insomnia; Malignant melanoma in situ ([address] ); and Reactive airway disease    She   Patient Active Problem List    Diagnosis Date Noted    Overweight (BMI 25 0-29 9) 11/29/2018    Elevated BP without diagnosis of hypertension 10/23/2018    Acute sinusitis 09/27/2018    Medial epicondylitis, left elbow 02/16/2017    Major depression, single episode 08/11/2016    Constipation 11/03/2014    Depressive disorder 11/03/2014    Malignant melanoma of skin of trunk ([facility] [address] ) 11/03/2014    Insomnia 05/31/2013    Irritable bowel syndrome 05/31/2013    Anxiety 05/31/2013 She  has a past surgical history that includes Skin lesion excision (Right, 2013); AUGMENTATION BREAST; and Skin biopsy (2013)  Her family history includes Diabetes in her other; Heart disease in her father; Hyperlipidemia in her other  She  reports that she has been smoking Cigarettes  She has been smoking about 1 00 pack per day  She uses smokeless tobacco  She reports that she drinks alcohol  She reports that she does not use drugs  Current Outpatient Prescriptions   Medication Sig Dispense Refill    albuterol (VENTOLIN HFA) 90 mcg/act inhaler Inhale 2 puffs every 6 (six) hours as needed for wheezing 18 g 1    escitalopram (LEXAPRO) 20 mg tablet Take 1 tablet (20 mg total) by mouth daily 90 tablet 0    fluticasone (FLONASE) 50 mcg/act nasal spray 2 sprays into each nostril daily 16 g 0    fluticasone-vilanterol (BREO ELLIPTA) 100-25 mcg/inh inhaler Inhale 1 puff daily Rinse mouth after use  1 Inhaler 1    meclizine (ANTIVERT) 25 mg tablet as needed      PREVIDENT 5000 SENSITIVE 1 1-5 % PSTE BRUSH 1 TIME A DAY BEFORE BED  5    terbinafine (LAMISIL) 250 mg tablet every 24 hours      valACYclovir (VALTREX) 1,000 mg tablet TAKE TWO TABS BY MOUTH AT ONSET, THEN TWO TABS 12 HRS  LATER  3    zolpidem (AMBIEN CR) 12 5 MG CR tablet Take 1 tablet (12 5 mg total) by mouth daily at bedtime as needed for sleep 30 tablet 0    lisdexamfetamine (VYVANSE) 30 MG capsule Take 1 capsule (30 mg total) by mouth every morning Max Daily Amount: 30 mg 30 capsule 0     No current facility-administered medications for this visit        Current Outpatient Prescriptions on File Prior to Visit   Medication Sig    albuterol (VENTOLIN HFA) 90 mcg/act inhaler Inhale 2 puffs every 6 (six) hours as needed for wheezing    escitalopram (LEXAPRO) 20 mg tablet Take 1 tablet (20 mg total) by mouth daily    fluticasone (FLONASE) 50 mcg/act nasal spray 2 sprays into each nostril daily    fluticasone-vilanterol (BREO ELLIPTA) 100-25 mcg/inh inhaler Inhale 1 puff daily Rinse mouth after use   meclizine (ANTIVERT) 25 mg tablet as needed    PREVIDENT 5000 SENSITIVE 1 1-5 % PSTE BRUSH 1 TIME A DAY BEFORE BED    terbinafine (LAMISIL) 250 mg tablet every 24 hours    valACYclovir (VALTREX) 1,000 mg tablet TAKE TWO TABS BY MOUTH AT ONSET, THEN TWO TABS 12 HRS  LATER    zolpidem (AMBIEN CR) 12 5 MG CR tablet Take 1 tablet (12 5 mg total) by mouth daily at bedtime as needed for sleep     No current facility-administered medications on file prior to visit  She is allergic to vilazodone; zolpidem; bacitracin; neomycin; and polymyxin b       Review of Systems      Objective:      /82 (BP Location: Left arm, Patient Position: Sitting, Cuff Size: Adult)   Pulse 70   Ht 5' 6 25" (1 683 m)   Wt 76 7 kg (169 lb 3 2 oz)   BMI 27 10 kg/m²          Physical Exam

## 2018-11-27 NOTE — PROGRESS NOTES
Assessment/Plan:    No problem-specific Assessment & Plan notes found for this encounter  Diagnoses and all orders for this visit:    Anxiety  Comments:  Pt currently using Lexapro and reports decrease in anxiety   Orders:  -     lisdexamfetamine (VYVANSE) 30 MG capsule; Take 1 capsule (30 mg total) by mouth every morning Max Daily Amount: 30 mg    Attention deficit hyperactivity disorder (ADHD), predominantly inattentive type  Comments:  Adult onset ADHD scale score elevated, Mood disorder Questionnarie completed also elevated  Orders:  -     lisdexamfetamine (VYVANSE) 30 MG capsule; Take 1 capsule (30 mg total) by mouth every morning Max Daily Amount: 30 mg  -     Toxicology screen, urine    Overweight (BMI 25 0-29  9)  Comments:  Pt started on Vyvance- side affects reviewed          Subjective:      Patient ID: Genaro Jacob is a 48 y o  female  48 yr old female here for concerns about excess anxiety, racing thoughts and "post its in her brain" and believes she may have Adult onset ADHD  Pt did complete the Mood disorder Questionnaire and the Adult ADHD Self Report Scale symptom Checklist  Pt scored highly on both scales  Pt denies any hx of depression or SI  Pt denies any family hx of psychiatric illness, ADHD or SI  Throughout assessment pt is talking incessantly and very upbeat  Pt also reports dieting, low carbohydrate, reduced calorie X3 mos and exercising 3 X week with no ability to lose weight  Pt is requesting to try Vyvance         The following portions of the patient's history were reviewed and updated as appropriate:   She  has a past medical history of Anxiety (5/31/2013); Arthritis; Cancer ([facility] [address] ); Herpes simplex; IBS (irritable bowel syndrome); Insomnia; Malignant melanoma in situ ([facility] [address] ); and Reactive airway disease    She   Patient Active Problem List    Diagnosis Date Noted    Overweight (BMI 25 0-29 9) 11/29/2018    Elevated BP without diagnosis of hypertension 10/23/2018    Acute sinusitis 09/27/2018    Medial epicondylitis, left elbow 02/16/2017    Major depression, single episode 08/11/2016    Constipation 11/03/2014    Depressive disorder 11/03/2014    Malignant melanoma of skin of trunk ([facility] [address] ) 11/03/2014    Insomnia 05/31/2013    Irritable bowel syndrome 05/31/2013    Anxiety 05/31/2013     She  has a past surgical history that includes Skin lesion excision (Right, 2013); AUGMENTATION BREAST; and Skin biopsy (2013)  Her family history includes Diabetes in her other; Heart disease in her father; Hyperlipidemia in her other  She  reports that she has been smoking Cigarettes  She has been smoking about 1 00 pack per day  She uses smokeless tobacco  She reports that she drinks alcohol  She reports that she does not use drugs  Current Outpatient Prescriptions   Medication Sig Dispense Refill    albuterol (VENTOLIN HFA) 90 mcg/act inhaler Inhale 2 puffs every 6 (six) hours as needed for wheezing 18 g 1    escitalopram (LEXAPRO) 20 mg tablet Take 1 tablet (20 mg total) by mouth daily 90 tablet 0    fluticasone (FLONASE) 50 mcg/act nasal spray 2 sprays into each nostril daily 16 g 0    fluticasone-vilanterol (BREO ELLIPTA) 100-25 mcg/inh inhaler Inhale 1 puff daily Rinse mouth after use  1 Inhaler 1    meclizine (ANTIVERT) 25 mg tablet as needed      PREVIDENT 5000 SENSITIVE 1 1-5 % PSTE BRUSH 1 TIME A DAY BEFORE BED  5    terbinafine (LAMISIL) 250 mg tablet every 24 hours      valACYclovir (VALTREX) 1,000 mg tablet TAKE TWO TABS BY MOUTH AT ONSET, THEN TWO TABS 12 HRS  LATER  3    zolpidem (AMBIEN CR) 12 5 MG CR tablet Take 1 tablet (12 5 mg total) by mouth daily at bedtime as needed for sleep 30 tablet 0    lisdexamfetamine (VYVANSE) 30 MG capsule Take 1 capsule (30 mg total) by mouth every morning Max Daily Amount: 30 mg 30 capsule 0     No current facility-administered medications for this visit        Current Outpatient Prescriptions on File Prior to Visit   Medication Sig    albuterol (VENTOLIN HFA) 90 mcg/act inhaler Inhale 2 puffs every 6 (six) hours as needed for wheezing    escitalopram (LEXAPRO) 20 mg tablet Take 1 tablet (20 mg total) by mouth daily    fluticasone (FLONASE) 50 mcg/act nasal spray 2 sprays into each nostril daily    fluticasone-vilanterol (BREO ELLIPTA) 100-25 mcg/inh inhaler Inhale 1 puff daily Rinse mouth after use   meclizine (ANTIVERT) 25 mg tablet as needed    PREVIDENT 5000 SENSITIVE 1 1-5 % PSTE BRUSH 1 TIME A DAY BEFORE BED    terbinafine (LAMISIL) 250 mg tablet every 24 hours    valACYclovir (VALTREX) 1,000 mg tablet TAKE TWO TABS BY MOUTH AT ONSET, THEN TWO TABS 12 HRS  LATER    zolpidem (AMBIEN CR) 12 5 MG CR tablet Take 1 tablet (12 5 mg total) by mouth daily at bedtime as needed for sleep     No current facility-administered medications on file prior to visit  She is allergic to vilazodone; zolpidem; bacitracin; neomycin; and polymyxin b       Review of Systems   Constitutional: Negative for fatigue  HENT: Negative for congestion, postnasal drip, rhinorrhea, sinus pain, sore throat and trouble swallowing  Eyes: Negative for pain and visual disturbance  Respiratory: Negative for cough, shortness of breath and wheezing  Cardiovascular: Negative for chest pain and palpitations  Gastrointestinal: Negative for constipation, diarrhea, nausea and vomiting  Endocrine: Negative for polydipsia, polyphagia and polyuria  Genitourinary: Negative for difficulty urinating, flank pain, frequency and pelvic pain  Musculoskeletal: Negative for arthralgias, back pain, joint swelling and myalgias  Skin: Negative  Negative for color change and rash  Neurological: Negative for dizziness, syncope, weakness, light-headedness, numbness and headaches  Hematological: Negative for adenopathy  Does not bruise/bleed easily  Psychiatric/Behavioral: Positive for decreased concentration and sleep disturbance   Negative for behavioral problems  The patient is nervous/anxious and is hyperactive  Objective:      /82 (BP Location: Left arm, Patient Position: Sitting, Cuff Size: Adult)   Pulse 70   Ht 5' 6 25" (1 683 m)   Wt 76 7 kg (169 lb 3 2 oz)   BMI 27 10 kg/m²          Physical Exam   Constitutional: She is oriented to person, place, and time  She appears well-developed and well-nourished  HENT:   Head: Normocephalic  Eyes: Pupils are equal, round, and reactive to light  Neck: Normal range of motion  Cardiovascular: Normal rate and regular rhythm  Pulmonary/Chest: Effort normal and breath sounds normal    Abdominal: Soft  Bowel sounds are normal    Musculoskeletal: Normal range of motion  Neurological: She is alert and oriented to person, place, and time  Skin: Skin is warm and dry  Psychiatric: Thought content normal  Her mood appears anxious  She is hyperactive  She expresses impulsivity  Nursing note and vitals reviewed

## 2018-11-29 PROBLEM — E66.3 OVERWEIGHT (BMI 25.0-29.9): Status: ACTIVE | Noted: 2018-11-29

## 2018-12-07 ENCOUNTER — HOSPITAL ENCOUNTER (OUTPATIENT)
Dept: MAMMOGRAPHY | Facility: HOSPITAL | Age: 53
Discharge: HOME | End: 2018-12-07
Payer: COMMERCIAL

## 2018-12-07 DIAGNOSIS — Z12.39 SCREENING FOR BREAST CANCER: ICD-10-CM

## 2018-12-07 PROCEDURE — 77063 BREAST TOMOSYNTHESIS BI: CPT

## 2018-12-07 PROCEDURE — 77067 SCR MAMMO BI INCL CAD: CPT

## 2018-12-27 ENCOUNTER — OFFICE VISIT (OUTPATIENT)
Dept: FAMILY MEDICINE CLINIC | Facility: CLINIC | Age: 53
End: 2018-12-27
Payer: COMMERCIAL

## 2018-12-27 VITALS
HEART RATE: 93 BPM | TEMPERATURE: 98.3 F | DIASTOLIC BLOOD PRESSURE: 88 MMHG | WEIGHT: 164.8 LBS | OXYGEN SATURATION: 98 % | BODY MASS INDEX: 26.48 KG/M2 | HEIGHT: 66 IN | SYSTOLIC BLOOD PRESSURE: 132 MMHG

## 2018-12-27 DIAGNOSIS — F32.A DEPRESSIVE DISORDER: ICD-10-CM

## 2018-12-27 DIAGNOSIS — E66.3 OVERWEIGHT (BMI 25.0-29.9): ICD-10-CM

## 2018-12-27 DIAGNOSIS — F90.0 ATTENTION DEFICIT HYPERACTIVITY DISORDER (ADHD), PREDOMINANTLY INATTENTIVE TYPE: Primary | ICD-10-CM

## 2018-12-27 DIAGNOSIS — F41.9 ANXIETY: ICD-10-CM

## 2018-12-27 PROCEDURE — 99214 OFFICE O/P EST MOD 30 MIN: CPT | Performed by: NURSE PRACTITIONER

## 2018-12-27 RX ORDER — ESCITALOPRAM OXALATE 20 MG/1
20 TABLET ORAL DAILY
Qty: 90 TABLET | Refills: 3 | Status: SHIPPED | OUTPATIENT
Start: 2018-12-27 | End: 2019-10-17 | Stop reason: SDUPTHER

## 2019-01-02 DIAGNOSIS — F15.10 ADDERALL USE DISORDER, MILD (HCC): Primary | ICD-10-CM

## 2019-01-19 ENCOUNTER — TELEPHONE (OUTPATIENT)
Dept: FAMILY MEDICINE CLINIC | Facility: CLINIC | Age: 54
End: 2019-01-19

## 2019-01-22 LAB
AMPHETAMINES UR QL SCN: NORMAL
BARBITURATES UR QL SCN: NORMAL
BENZODIAZ UR QL SCN: NORMAL
CANNABINOIDS UR QL SCN: NORMAL

## 2019-01-24 ENCOUNTER — OFFICE VISIT (OUTPATIENT)
Dept: FAMILY MEDICINE CLINIC | Facility: CLINIC | Age: 54
End: 2019-01-24
Payer: COMMERCIAL

## 2019-01-24 VITALS
WEIGHT: 158 LBS | TEMPERATURE: 97.4 F | BODY MASS INDEX: 25.39 KG/M2 | HEIGHT: 66 IN | SYSTOLIC BLOOD PRESSURE: 138 MMHG | DIASTOLIC BLOOD PRESSURE: 82 MMHG

## 2019-01-24 DIAGNOSIS — J06.9 UPPER RESPIRATORY TRACT INFECTION, UNSPECIFIED TYPE: ICD-10-CM

## 2019-01-24 DIAGNOSIS — R07.89 COSTOCHONDRAL CHEST PAIN: ICD-10-CM

## 2019-01-24 DIAGNOSIS — J11.1 INFLUENZA: Primary | ICD-10-CM

## 2019-01-24 DIAGNOSIS — F41.9 ANXIETY AND DEPRESSION: ICD-10-CM

## 2019-01-24 DIAGNOSIS — Z12.11 SCREENING FOR COLON CANCER: ICD-10-CM

## 2019-01-24 DIAGNOSIS — F32.A ANXIETY AND DEPRESSION: ICD-10-CM

## 2019-01-24 DIAGNOSIS — F90.0 ATTENTION DEFICIT HYPERACTIVITY DISORDER (ADHD), PREDOMINANTLY INATTENTIVE TYPE: ICD-10-CM

## 2019-01-24 DIAGNOSIS — E66.3 OVERWEIGHT (BMI 25.0-29.9): ICD-10-CM

## 2019-01-24 PROCEDURE — 99214 OFFICE O/P EST MOD 30 MIN: CPT | Performed by: NURSE PRACTITIONER

## 2019-01-24 PROCEDURE — 3008F BODY MASS INDEX DOCD: CPT | Performed by: NURSE PRACTITIONER

## 2019-01-24 RX ORDER — IBUPROFEN 800 MG/1
800 TABLET ORAL EVERY 8 HOURS PRN
Qty: 30 TABLET | Refills: 0 | Status: SHIPPED | OUTPATIENT
Start: 2019-01-24 | End: 2019-02-14 | Stop reason: SDUPTHER

## 2019-01-24 RX ORDER — AZITHROMYCIN 250 MG/1
TABLET, FILM COATED ORAL
Qty: 6 TABLET | Refills: 0 | Status: SHIPPED | OUTPATIENT
Start: 2019-01-24 | End: 2019-01-28

## 2019-01-24 NOTE — LETTER
January 24, 2019     Rose Mary Hinton  No Recipients            To Whom It May Concern:    I am sending this letter on behalf of Rose Mary Hinton (a 48 y o  female) for pre-approval for Bariatric Surgery; specifically the {common bariatric procedures:20115}  The Procedure will be {inpatient / outpatient:20114} at *** with a scheduled surgery date of ***  Along with Morbid Obesity, Hunter Tomas has a history of {Diagnoses; bariatric surgery:70353}  Rose Mary Hinton  has made numerous attempts at dieting and exercise program, and has failed to maintain any sustained weight loss  A letter of necessity is enclosed from her primary care physician (PCP)  Weight/Height/BMI    Estimated body mass index is 25 31 kg/m² as calculated from the following:    Height as of this encounter: 5' 6 25" (1 683 m)  Weight as of this encounter: 71 7 kg (158 lb)  Rose Mary Hinton has been evaluated in our bariatric program by myself, the , and the , and the program dietician and is felt to be an excellent candidate for surgery  In addition, she has undergone a psychological evaluation, from which a letter is enclosed  Rose Mary Hinton has undergone extensive pre-operative education and understands all the risks, benefits, and possible complications of surgery  She has also undergone dietary education and thorough nutritional evaluation via a registered dietician  Our program provides long term nutritional counseling with unlimited consults with the dietician  Our team is sending this letter to receive pre-approval for the indicated procedure  Please let us know if you have any questions or require any further information           Sincerely,        TYLER Wu        CC: No Recipients

## 2019-01-24 NOTE — PROGRESS NOTES
Assessment/Plan:    No problem-specific Assessment & Plan notes found for this encounter  Diagnoses and all orders for this visit:    Influenza  Comments:  Pt has been ill X4 days  declines Tamiflu    Screening for colon cancer  Comments:  FIT test ordered  Orders:  -     Occult Blood, Fecal Immunochemical; Future    Overweight (BMI 25 0-29  9)  Comments:  Pt has lost 6 lbs this month    Anxiety and depression  Comments:  Pt currently managed on Lexapro without difficulty    Attention deficit hyperactivity disorder (ADHD), predominantly inattentive type  Comments:  Pt reports minimal symptom relief of anxiety or hyperactivity with Vynase will increase to 50mg daily  Orders:  -     lisdexamfetamine (VYVANSE) 50 MG capsule; Take 1 capsule (50 mg total) by mouth every morning Max Daily Amount: 50 mg    Costochondral chest pain  Comments:  Ibuprofen 800 mg Rx provided  Orders:  -     ibuprofen (MOTRIN) 800 mg tablet; Take 1 tablet (800 mg total) by mouth every 8 (eight) hours as needed for mild pain    Upper respiratory tract infection, unspecified type  Comments:  Pt provided with Rx for Zithromax should respiratory symptoms worsen will not take it unless condition worsens, advised to return if no improvement  Orders:  -     azithromycin (ZITHROMAX) 250 mg tablet; Take 2 tablets today then 1 tablet daily x 4 days          Subjective:      Patient ID: Jean Claude Morrison is a 48 y o  female  Anahy Mckenna is a 48 y o  female who presents for evaluation of influenza like symptoms  Symptoms include chills, headache, myalgias, productive cough and fever and have been present for 4 days  She has tried to alleviate the symptoms with rest and Excedrin migraine for headache with minimal relief  High risk factors for influenza complications: smoker and exposed to many people at [employer]  Pt reports symptoms have waxed and waned with generalized myalgias keeping her on the couch   She did not go to work yesterday and I advised her not to go today so that she can rest  Pt did have the flu shot  Pt is also c/o midsternal burning chest pain with cough, which is non productive  Pt has not tried any Ibuprofen but is agreeable to prescription   Pt reports little improvement with increase in Yvanse 40 mg we agreed to increase the medication to 50 mg and if no improvement I will substitute another medication for ADHD  Pt reports she is sleeping better at night with Vynase, experiencing no side affects, does seem to be more relaxed but hard to assess with current state of illness  The following portions of the patient's history were reviewed and updated as appropriate: She  has a past medical history of Anxiety (5/31/2013); Arthritis; Herpes simplex; IBS (irritable bowel syndrome); Insomnia; Malignant melanoma in situ ([facility] [address] ); and Reactive airway disease  She   Patient Active Problem List    Diagnosis Date Noted    Overweight (BMI 25 0-29 9) 11/29/2018    Elevated BP without diagnosis of hypertension 10/23/2018    Acute sinusitis 09/27/2018    Medial epicondylitis, left elbow 02/16/2017    Major depression, single episode 08/11/2016    Constipation 11/03/2014    Depressive disorder 11/03/2014    Malignant melanoma of skin of trunk ([facility] [address] ) 11/03/2014    Insomnia 05/31/2013    Irritable bowel syndrome 05/31/2013    Anxiety 05/31/2013     She  has a past surgical history that includes Skin lesion excision (Right, 2013); AUGMENTATION BREAST; and Skin biopsy (2013)  Her family history includes Diabetes in her other; Heart disease in her father; Hyperlipidemia in her other  She  reports that she has been smoking Cigarettes  She has been smoking about 1 00 pack per day  She uses smokeless tobacco  She reports that she drinks alcohol  She reports that she does not use drugs    Current Outpatient Prescriptions   Medication Sig Dispense Refill    albuterol (VENTOLIN HFA) 90 mcg/act inhaler Inhale 2 puffs every 6 (six) hours as needed for wheezing 18 g 1    azithromycin (ZITHROMAX) 250 mg tablet Take 2 tablets today then 1 tablet daily x 4 days 6 tablet 0    escitalopram (LEXAPRO) 20 mg tablet Take 1 tablet (20 mg total) by mouth daily 90 tablet 3    fluticasone (FLONASE) 50 mcg/act nasal spray 2 sprays into each nostril daily (Patient not taking: Reported on 12/27/2018 ) 16 g 0    fluticasone-vilanterol (BREO ELLIPTA) 100-25 mcg/inh inhaler Inhale 1 puff daily Rinse mouth after use  (Patient not taking: Reported on 12/27/2018 ) 1 Inhaler 1    ibuprofen (MOTRIN) 800 mg tablet Take 1 tablet (800 mg total) by mouth every 8 (eight) hours as needed for mild pain 30 tablet 0    lisdexamfetamine (VYVANSE) 50 MG capsule Take 1 capsule (50 mg total) by mouth every morning Max Daily Amount: 50 mg 30 capsule 0    PREVIDENT 5000 SENSITIVE 1 1-5 % PSTE BRUSH 1 TIME A DAY BEFORE BED  5    terbinafine (LAMISIL) 250 mg tablet every 24 hours      valACYclovir (VALTREX) 1,000 mg tablet TAKE TWO TABS BY MOUTH AT ONSET, THEN TWO TABS 12 HRS  LATER  3     No current facility-administered medications for this visit  Current Outpatient Prescriptions on File Prior to Visit   Medication Sig    albuterol (VENTOLIN HFA) 90 mcg/act inhaler Inhale 2 puffs every 6 (six) hours as needed for wheezing    escitalopram (LEXAPRO) 20 mg tablet Take 1 tablet (20 mg total) by mouth daily    fluticasone (FLONASE) 50 mcg/act nasal spray 2 sprays into each nostril daily (Patient not taking: Reported on 12/27/2018 )    fluticasone-vilanterol (BREO ELLIPTA) 100-25 mcg/inh inhaler Inhale 1 puff daily Rinse mouth after use  (Patient not taking: Reported on 12/27/2018 )    PREVIDENT 5000 SENSITIVE 1 1-5 % PSTE BRUSH 1 TIME A DAY BEFORE BED    terbinafine (LAMISIL) 250 mg tablet every 24 hours    valACYclovir (VALTREX) 1,000 mg tablet TAKE TWO TABS BY MOUTH AT ONSET, THEN TWO TABS 12 HRS   LATER    [DISCONTINUED] lisdexamfetamine (VYVANSE) 40 MG capsule Take 1 capsule (40 mg total) by mouth every morning Max Daily Amount: 40 mg    [DISCONTINUED] meclizine (ANTIVERT) 25 mg tablet as needed    [DISCONTINUED] zolpidem (AMBIEN CR) 12 5 MG CR tablet Take 1 tablet (12 5 mg total) by mouth daily at bedtime as needed for sleep     No current facility-administered medications on file prior to visit  She is allergic to vilazodone; zolpidem; bacitracin; neomycin; and polymyxin b       Review of Systems   Constitutional: Positive for fatigue and fever  HENT: Positive for postnasal drip, rhinorrhea, sinus pain and sinus pressure  Negative for congestion, ear pain, sore throat and trouble swallowing  Eyes: Negative  Negative for pain and visual disturbance  Respiratory: Positive for cough  Negative for shortness of breath and wheezing  Burning chest with coughing over the past 2 days  Non productive cough   Cardiovascular: Negative  Negative for chest pain and palpitations  Gastrointestinal: Negative  Negative for constipation, diarrhea, nausea and vomiting  Endocrine: Negative  Negative for polydipsia, polyphagia and polyuria  Genitourinary: Negative  Negative for difficulty urinating, flank pain, frequency and pelvic pain  Musculoskeletal: Positive for myalgias  Negative for arthralgias, back pain and joint swelling  Skin: Negative  Negative for color change and rash  Allergic/Immunologic: Negative  Neurological: Positive for headaches  Negative for dizziness, syncope, weakness, light-headedness and numbness  Hematological: Negative  Negative for adenopathy  Does not bruise/bleed easily  Psychiatric/Behavioral: Positive for decreased concentration  Negative for behavioral problems and sleep disturbance  The patient is not nervous/anxious            Objective:      /82   Temp (!) 97 4 °F (36 3 °C)   Ht 5' 6 25" (1 683 m)   Wt 71 7 kg (158 lb)   BMI 25 31 kg/m²          Physical Exam   Constitutional: She is oriented to person, place, and time  She appears well-developed and well-nourished  She appears ill  HENT:   Head: Normocephalic  Right Ear: Tympanic membrane is erythematous  A middle ear effusion is present  Left Ear: Tympanic membrane is erythematous  A middle ear effusion is present  Nose: Mucosal edema and rhinorrhea present  Eyes: Pupils are equal, round, and reactive to light  Neck: Normal range of motion  Cardiovascular: Normal rate and regular rhythm  Pulmonary/Chest: Effort normal  She has wheezes in the left middle field and the left lower field  Abdominal: Soft  Bowel sounds are normal    Musculoskeletal: Normal range of motion  Neurological: She is alert and oriented to person, place, and time  Skin: Skin is warm and dry  Psychiatric: She has a normal mood and affect  Her behavior is normal  Judgment and thought content normal    Nursing note and vitals reviewed

## 2019-02-14 DIAGNOSIS — R07.89 COSTOCHONDRAL CHEST PAIN: ICD-10-CM

## 2019-02-14 RX ORDER — IBUPROFEN 800 MG/1
800 TABLET ORAL EVERY 8 HOURS PRN
Qty: 90 TABLET | Refills: 3 | Status: SHIPPED | OUTPATIENT
Start: 2019-02-14 | End: 2020-07-31 | Stop reason: ALTCHOICE

## 2019-02-26 DIAGNOSIS — F90.0 ATTENTION DEFICIT HYPERACTIVITY DISORDER (ADHD), PREDOMINANTLY INATTENTIVE TYPE: ICD-10-CM

## 2019-02-26 DIAGNOSIS — R06.02 SOB (SHORTNESS OF BREATH): ICD-10-CM

## 2019-02-26 RX ORDER — ALBUTEROL SULFATE 90 UG/1
2 AEROSOL, METERED RESPIRATORY (INHALATION) EVERY 6 HOURS PRN
Qty: 18 G | Refills: 5 | OUTPATIENT
Start: 2019-02-26 | End: 2019-03-14 | Stop reason: SDUPTHER

## 2019-02-26 RX ORDER — ALBUTEROL SULFATE 90 UG/1
2 AEROSOL, METERED RESPIRATORY (INHALATION) EVERY 6 HOURS PRN
Qty: 18 G | Refills: 5 | Status: CANCELLED | OUTPATIENT
Start: 2019-02-26

## 2019-03-14 DIAGNOSIS — R06.02 SOB (SHORTNESS OF BREATH): Primary | ICD-10-CM

## 2019-03-14 RX ORDER — ALBUTEROL SULFATE 90 UG/1
2 AEROSOL, METERED RESPIRATORY (INHALATION) EVERY 6 HOURS PRN
Qty: 18 G | Refills: 5 | Status: SHIPPED | OUTPATIENT
Start: 2019-03-14 | End: 2019-08-12 | Stop reason: SDUPTHER

## 2019-03-26 ENCOUNTER — OFFICE VISIT (OUTPATIENT)
Dept: FAMILY MEDICINE CLINIC | Facility: CLINIC | Age: 54
End: 2019-03-26
Payer: COMMERCIAL

## 2019-03-26 VITALS
BODY MASS INDEX: 24.81 KG/M2 | DIASTOLIC BLOOD PRESSURE: 80 MMHG | WEIGHT: 154.4 LBS | HEIGHT: 66 IN | TEMPERATURE: 98.6 F | OXYGEN SATURATION: 99 % | HEART RATE: 80 BPM | SYSTOLIC BLOOD PRESSURE: 126 MMHG

## 2019-03-26 DIAGNOSIS — F90.0 ATTENTION DEFICIT HYPERACTIVITY DISORDER (ADHD), PREDOMINANTLY INATTENTIVE TYPE: ICD-10-CM

## 2019-03-26 DIAGNOSIS — R03.0 ELEVATED BP WITHOUT DIAGNOSIS OF HYPERTENSION: Primary | ICD-10-CM

## 2019-03-26 DIAGNOSIS — E66.3 OVERWEIGHT (BMI 25.0-29.9): ICD-10-CM

## 2019-03-26 PROCEDURE — 99214 OFFICE O/P EST MOD 30 MIN: CPT | Performed by: NURSE PRACTITIONER

## 2019-03-26 PROCEDURE — 3008F BODY MASS INDEX DOCD: CPT | Performed by: NURSE PRACTITIONER

## 2019-03-26 NOTE — PROGRESS NOTES
Assessment/Plan:    No problem-specific Assessment & Plan notes found for this encounter  Diagnoses and all orders for this visit:    Elevated BP without diagnosis of hypertension  Comments:  124/86  no need for medication at this time    Overweight (BMI 25 0-29  9)  Comments:  Pt has lost additional 4 lbs today    Attention deficit hyperactivity disorder (ADHD), predominantly inattentive type  Comments:  Vyvance increased to 60mg today  Orders:  -     lisdexamfetamine (VYVANSE) 60 MG capsule; Take 1 capsule (60 mg total) by mouth every morningMax Daily Amount: 60 mg          Subjective:      Patient ID: Nano Landeros is a 48 y o  female  48 YR old female here for medication review, pt still taking Yvanse  Pt would like it increased today   Pt states she is having no concerns of chest pain, rapid heart rate or difficulty sleeping  The following portions of the patient's history were reviewed and updated as appropriate: She  has a past medical history of Anxiety (5/31/2013), Arthritis, Herpes simplex, IBS (irritable bowel syndrome), Insomnia, Malignant melanoma in situ ([facility] [address] ), and Reactive airway disease  She   Patient Active Problem List    Diagnosis Date Noted    Overweight (BMI 25 0-29 9) 11/29/2018    Elevated BP without diagnosis of hypertension 10/23/2018    Acute sinusitis 09/27/2018    Medial epicondylitis, left elbow 02/16/2017    Major depression, single episode 08/11/2016    Constipation 11/03/2014    Depressive disorder 11/03/2014    Malignant melanoma of skin of trunk ([facility] [address] ) 11/03/2014    Insomnia 05/31/2013    Irritable bowel syndrome 05/31/2013    Anxiety 05/31/2013     She  has a past surgical history that includes Skin lesion excision (Right, 2013); AUGMENTATION BREAST; and Skin biopsy (2013)  Her family history includes Diabetes in her other; Heart disease in her father; Hyperlipidemia in her other  She  reports that she has been smoking cigarettes    She has been smoking about 1 00 pack per day  She uses smokeless tobacco  She reports that she drinks alcohol  She reports that she does not use drugs  Current Outpatient Medications   Medication Sig Dispense Refill    albuterol (VENTOLIN HFA) 90 mcg/act inhaler Inhale 2 puffs every 6 (six) hours as needed for wheezing 18 g 5    escitalopram (LEXAPRO) 20 mg tablet Take 1 tablet (20 mg total) by mouth daily 90 tablet 3    ibuprofen (MOTRIN) 800 mg tablet Take 1 tablet (800 mg total) by mouth every 8 (eight) hours as needed for mild pain 90 tablet 3    lisdexamfetamine (VYVANSE) 60 MG capsule Take 1 capsule (60 mg total) by mouth every morningMax Daily Amount: 60 mg 90 capsule 0    PREVIDENT 5000 SENSITIVE 1 1-5 % PSTE BRUSH 1 TIME A DAY BEFORE BED  5    terbinafine (LAMISIL) 250 mg tablet every 24 hours      valACYclovir (VALTREX) 1,000 mg tablet TAKE TWO TABS BY MOUTH AT ONSET, THEN TWO TABS 12 HRS  LATER  3    fluticasone (FLONASE) 50 mcg/act nasal spray 2 sprays into each nostril daily (Patient not taking: Reported on 12/27/2018 ) 16 g 0    fluticasone-vilanterol (BREO ELLIPTA) 100-25 mcg/inh inhaler Inhale 1 puff daily Rinse mouth after use  (Patient not taking: Reported on 12/27/2018 ) 1 Inhaler 1     No current facility-administered medications for this visit  Current Outpatient Medications on File Prior to Visit   Medication Sig    albuterol (VENTOLIN HFA) 90 mcg/act inhaler Inhale 2 puffs every 6 (six) hours as needed for wheezing    escitalopram (LEXAPRO) 20 mg tablet Take 1 tablet (20 mg total) by mouth daily    ibuprofen (MOTRIN) 800 mg tablet Take 1 tablet (800 mg total) by mouth every 8 (eight) hours as needed for mild pain    PREVIDENT 5000 SENSITIVE 1 1-5 % PSTE BRUSH 1 TIME A DAY BEFORE BED    terbinafine (LAMISIL) 250 mg tablet every 24 hours    valACYclovir (VALTREX) 1,000 mg tablet TAKE TWO TABS BY MOUTH AT ONSET, THEN TWO TABS 12 HRS   LATER    [DISCONTINUED] lisdexamfetamine (VYVANSE) 50 MG capsule Take 1 capsule (50 mg total) by mouth every morningMax Daily Amount: 50 mg    fluticasone (FLONASE) 50 mcg/act nasal spray 2 sprays into each nostril daily (Patient not taking: Reported on 12/27/2018 )    fluticasone-vilanterol (BREO ELLIPTA) 100-25 mcg/inh inhaler Inhale 1 puff daily Rinse mouth after use  (Patient not taking: Reported on 12/27/2018 )     No current facility-administered medications on file prior to visit  She is allergic to vilazodone; zolpidem; bacitracin; neomycin; and polymyxin b       Review of Systems   Constitutional: Negative  HENT: Negative  Eyes: Negative  Respiratory: Negative  Cardiovascular: Negative  Gastrointestinal: Negative  Endocrine: Negative  Genitourinary: Negative  Musculoskeletal: Negative  Skin: Negative  Allergic/Immunologic: Negative  Neurological: Negative  Hematological: Negative  Psychiatric/Behavioral: Negative  Objective:      /80 (BP Location: Left arm, Patient Position: Sitting, Cuff Size: Adult)   Pulse 80   Temp 98 6 °F (37 °C) (Tympanic)   Ht 5' 6 25" (1 683 m)   Wt 70 kg (154 lb 6 4 oz)   SpO2 99%   BMI 24 73 kg/m²          Physical Exam   Constitutional: She is oriented to person, place, and time  She appears well-developed and well-nourished  HENT:   Head: Normocephalic  Eyes: Pupils are equal, round, and reactive to light  Neck: Normal range of motion  Cardiovascular: Normal rate and regular rhythm  Pulmonary/Chest: Effort normal and breath sounds normal    Abdominal: Soft  Bowel sounds are normal    Musculoskeletal: Normal range of motion  Neurological: She is alert and oriented to person, place, and time  Skin: Skin is warm and dry  Psychiatric: She has a normal mood and affect  Her behavior is normal  Judgment and thought content normal    Nursing note and vitals reviewed

## 2019-04-22 DIAGNOSIS — E66.3 OVERWEIGHT: Primary | ICD-10-CM

## 2019-04-22 DIAGNOSIS — F90.0 ATTENTION DEFICIT HYPERACTIVITY DISORDER (ADHD), PREDOMINANTLY INATTENTIVE TYPE: ICD-10-CM

## 2019-05-07 ENCOUNTER — CLINICAL SUPPORT (OUTPATIENT)
Dept: FAMILY MEDICINE CLINIC | Facility: CLINIC | Age: 54
End: 2019-05-07
Payer: COMMERCIAL

## 2019-05-07 DIAGNOSIS — Z23 ENCOUNTER FOR IMMUNIZATION: Primary | ICD-10-CM

## 2019-05-07 PROCEDURE — 90471 IMMUNIZATION ADMIN: CPT

## 2019-05-07 PROCEDURE — 90707 MMR VACCINE SC: CPT

## 2019-05-20 DIAGNOSIS — F90.0 ATTENTION DEFICIT HYPERACTIVITY DISORDER (ADHD), PREDOMINANTLY INATTENTIVE TYPE: ICD-10-CM

## 2019-06-11 ENCOUNTER — OFFICE VISIT (OUTPATIENT)
Dept: FAMILY MEDICINE CLINIC | Facility: CLINIC | Age: 54
End: 2019-06-11
Payer: COMMERCIAL

## 2019-06-11 VITALS
WEIGHT: 156 LBS | SYSTOLIC BLOOD PRESSURE: 142 MMHG | HEIGHT: 66 IN | BODY MASS INDEX: 25.07 KG/M2 | TEMPERATURE: 97.9 F | DIASTOLIC BLOOD PRESSURE: 82 MMHG

## 2019-06-11 DIAGNOSIS — F32.9 MAJOR DEPRESSIVE DISORDER WITH SINGLE EPISODE, REMISSION STATUS UNSPECIFIED: ICD-10-CM

## 2019-06-11 DIAGNOSIS — F90.2 ATTENTION DEFICIT HYPERACTIVITY DISORDER (ADHD), COMBINED TYPE: ICD-10-CM

## 2019-06-11 DIAGNOSIS — Z00.00 ANNUAL PHYSICAL EXAM: Primary | ICD-10-CM

## 2019-06-11 DIAGNOSIS — F41.9 ANXIETY: ICD-10-CM

## 2019-06-11 DIAGNOSIS — R03.0 ELEVATED BP WITHOUT DIAGNOSIS OF HYPERTENSION: Primary | ICD-10-CM

## 2019-06-11 DIAGNOSIS — F17.200 CURRENT EVERY DAY SMOKER: ICD-10-CM

## 2019-06-11 DIAGNOSIS — B35.1 FUNGAL NAIL INFECTION: ICD-10-CM

## 2019-06-11 PROCEDURE — 99214 OFFICE O/P EST MOD 30 MIN: CPT | Performed by: NURSE PRACTITIONER

## 2019-06-11 PROCEDURE — 3008F BODY MASS INDEX DOCD: CPT | Performed by: NURSE PRACTITIONER

## 2019-06-11 RX ORDER — MUPIROCIN CALCIUM 20 MG/G
CREAM TOPICAL 3 TIMES DAILY
Qty: 15 G | Refills: 3 | Status: SHIPPED | OUTPATIENT
Start: 2019-06-11 | End: 2019-10-17 | Stop reason: SDUPTHER

## 2019-06-15 PROBLEM — F90.2 ATTENTION DEFICIT HYPERACTIVITY DISORDER (ADHD), COMBINED TYPE: Status: ACTIVE | Noted: 2019-06-15

## 2019-06-20 DIAGNOSIS — F90.0 ATTENTION DEFICIT HYPERACTIVITY DISORDER (ADHD), PREDOMINANTLY INATTENTIVE TYPE: ICD-10-CM

## 2019-07-16 DIAGNOSIS — F90.0 ATTENTION DEFICIT HYPERACTIVITY DISORDER (ADHD), PREDOMINANTLY INATTENTIVE TYPE: ICD-10-CM

## 2019-08-12 DIAGNOSIS — R06.02 SOB (SHORTNESS OF BREATH): ICD-10-CM

## 2019-08-12 RX ORDER — ALBUTEROL SULFATE 90 UG/1
AEROSOL, METERED RESPIRATORY (INHALATION)
Qty: 8.5 INHALER | Refills: 5 | Status: SHIPPED | OUTPATIENT
Start: 2019-08-12 | End: 2020-01-08

## 2019-08-13 DIAGNOSIS — F90.0 ATTENTION DEFICIT HYPERACTIVITY DISORDER (ADHD), PREDOMINANTLY INATTENTIVE TYPE: ICD-10-CM

## 2019-09-12 DIAGNOSIS — F90.0 ATTENTION DEFICIT HYPERACTIVITY DISORDER (ADHD), PREDOMINANTLY INATTENTIVE TYPE: ICD-10-CM

## 2019-10-10 ENCOUNTER — APPOINTMENT (OUTPATIENT)
Dept: LAB | Facility: HOSPITAL | Age: 54
End: 2019-10-10
Payer: COMMERCIAL

## 2019-10-10 DIAGNOSIS — Z00.00 ANNUAL PHYSICAL EXAM: ICD-10-CM

## 2019-10-10 LAB
25(OH)D3 SERPL-MCNC: 47.2 NG/ML (ref 30–100)
ALBUMIN SERPL BCP-MCNC: 4.4 G/DL (ref 3.5–5.7)
ALP SERPL-CCNC: 50 U/L (ref 40–150)
ALT SERPL W P-5'-P-CCNC: 14 U/L (ref 7–52)
ANION GAP SERPL CALCULATED.3IONS-SCNC: 6 MMOL/L (ref 4–13)
AST SERPL W P-5'-P-CCNC: 16 U/L (ref 13–39)
BACTERIA UR QL AUTO: ABNORMAL /HPF
BASOPHILS # BLD AUTO: 0.1 THOUSANDS/ΜL (ref 0–0.1)
BASOPHILS NFR BLD AUTO: 1 % (ref 0–2)
BILIRUB SERPL-MCNC: 0.4 MG/DL (ref 0.2–1)
BILIRUB UR QL STRIP: NEGATIVE
BUN SERPL-MCNC: 17 MG/DL (ref 7–25)
CALCIUM SERPL-MCNC: 9.6 MG/DL (ref 8.6–10.5)
CHLORIDE SERPL-SCNC: 106 MMOL/L (ref 98–107)
CHOLEST SERPL-MCNC: 203 MG/DL (ref 0–200)
CLARITY UR: CLEAR
CO2 SERPL-SCNC: 28 MMOL/L (ref 21–31)
COLOR UR: YELLOW
CREAT SERPL-MCNC: 0.92 MG/DL (ref 0.6–1.2)
EOSINOPHIL # BLD AUTO: 0.2 THOUSAND/ΜL (ref 0–0.61)
EOSINOPHIL NFR BLD AUTO: 3 % (ref 0–5)
ERYTHROCYTE [DISTWIDTH] IN BLOOD BY AUTOMATED COUNT: 13.3 % (ref 11.5–14.5)
GFR SERPL CREATININE-BSD FRML MDRD: 72 ML/MIN/1.73SQ M
GLUCOSE P FAST SERPL-MCNC: 92 MG/DL (ref 65–99)
GLUCOSE UR STRIP-MCNC: NEGATIVE MG/DL
HCT VFR BLD AUTO: 43.1 % (ref 42–47)
HDLC SERPL-MCNC: 63 MG/DL (ref 40–60)
HGB BLD-MCNC: 14.4 G/DL (ref 12–16)
HGB UR QL STRIP.AUTO: ABNORMAL
KETONES UR STRIP-MCNC: NEGATIVE MG/DL
LDLC SERPL CALC-MCNC: 124 MG/DL (ref 0–100)
LEUKOCYTE ESTERASE UR QL STRIP: NEGATIVE
LYMPHOCYTES # BLD AUTO: 2.2 THOUSANDS/ΜL (ref 0.6–4.47)
LYMPHOCYTES NFR BLD AUTO: 34 % (ref 21–51)
MCH RBC QN AUTO: 30.3 PG (ref 26–34)
MCHC RBC AUTO-ENTMCNC: 33.5 G/DL (ref 31–37)
MCV RBC AUTO: 90 FL (ref 81–99)
MONOCYTES # BLD AUTO: 0.5 THOUSAND/ΜL (ref 0.17–1.22)
MONOCYTES NFR BLD AUTO: 9 % (ref 2–12)
NEUTROPHILS # BLD AUTO: 3.3 THOUSANDS/ΜL (ref 1.4–6.5)
NEUTS SEG NFR BLD AUTO: 53 % (ref 42–75)
NITRITE UR QL STRIP: NEGATIVE
NON-SQ EPI CELLS URNS QL MICRO: ABNORMAL /HPF
NONHDLC SERPL-MCNC: 140 MG/DL
PH UR STRIP.AUTO: 5.5 [PH]
PLATELET # BLD AUTO: 247 THOUSANDS/UL (ref 149–390)
PMV BLD AUTO: 9.4 FL (ref 8.6–11.7)
POTASSIUM SERPL-SCNC: 4 MMOL/L (ref 3.5–5.5)
PROT SERPL-MCNC: 6.8 G/DL (ref 6.4–8.9)
PROT UR STRIP-MCNC: NEGATIVE MG/DL
RBC # BLD AUTO: 4.77 MILLION/UL (ref 3.9–5.2)
RBC #/AREA URNS AUTO: ABNORMAL /HPF
SODIUM SERPL-SCNC: 140 MMOL/L (ref 134–143)
SP GR UR STRIP.AUTO: 1.01 (ref 1–1.03)
TRIGL SERPL-MCNC: 79 MG/DL (ref 44–166)
TSH SERPL DL<=0.05 MIU/L-ACNC: 3.74 UIU/ML (ref 0.45–5.33)
UROBILINOGEN UR QL STRIP.AUTO: 0.2 E.U./DL
WBC # BLD AUTO: 6.4 THOUSAND/UL (ref 4.8–10.8)
WBC #/AREA URNS AUTO: ABNORMAL /HPF

## 2019-10-10 PROCEDURE — 80053 COMPREHEN METABOLIC PANEL: CPT

## 2019-10-10 PROCEDURE — 81001 URINALYSIS AUTO W/SCOPE: CPT

## 2019-10-10 PROCEDURE — 82306 VITAMIN D 25 HYDROXY: CPT

## 2019-10-10 PROCEDURE — 36415 COLL VENOUS BLD VENIPUNCTURE: CPT

## 2019-10-10 PROCEDURE — 84443 ASSAY THYROID STIM HORMONE: CPT

## 2019-10-10 PROCEDURE — 80061 LIPID PANEL: CPT

## 2019-10-10 PROCEDURE — 85025 COMPLETE CBC W/AUTO DIFF WBC: CPT

## 2019-10-17 ENCOUNTER — OFFICE VISIT (OUTPATIENT)
Dept: FAMILY MEDICINE CLINIC | Facility: CLINIC | Age: 54
End: 2019-10-17
Payer: COMMERCIAL

## 2019-10-17 VITALS
SYSTOLIC BLOOD PRESSURE: 118 MMHG | TEMPERATURE: 96.6 F | BODY MASS INDEX: 25.71 KG/M2 | DIASTOLIC BLOOD PRESSURE: 80 MMHG | HEIGHT: 66 IN | WEIGHT: 160 LBS

## 2019-10-17 DIAGNOSIS — E78.49 OTHER HYPERLIPIDEMIA: ICD-10-CM

## 2019-10-17 DIAGNOSIS — F41.9 ANXIETY: ICD-10-CM

## 2019-10-17 DIAGNOSIS — R21 RASH: ICD-10-CM

## 2019-10-17 DIAGNOSIS — F90.0 ATTENTION DEFICIT HYPERACTIVITY DISORDER (ADHD), PREDOMINANTLY INATTENTIVE TYPE: ICD-10-CM

## 2019-10-17 DIAGNOSIS — G47.00 INSOMNIA, UNSPECIFIED TYPE: ICD-10-CM

## 2019-10-17 DIAGNOSIS — B35.1 TOENAIL FUNGUS: ICD-10-CM

## 2019-10-17 DIAGNOSIS — J06.9 URI, ACUTE: ICD-10-CM

## 2019-10-17 DIAGNOSIS — B35.1 FUNGAL NAIL INFECTION: ICD-10-CM

## 2019-10-17 DIAGNOSIS — Z00.01 ENCOUNTER FOR WELL ADULT EXAM WITH ABNORMAL FINDINGS: Primary | ICD-10-CM

## 2019-10-17 DIAGNOSIS — F17.200 CURRENT SMOKER: ICD-10-CM

## 2019-10-17 DIAGNOSIS — Z12.11 SCREENING FOR COLON CANCER: ICD-10-CM

## 2019-10-17 DIAGNOSIS — F32.A DEPRESSIVE DISORDER: ICD-10-CM

## 2019-10-17 PROCEDURE — 99396 PREV VISIT EST AGE 40-64: CPT | Performed by: NURSE PRACTITIONER

## 2019-10-17 RX ORDER — METHYLPREDNISOLONE 4 MG/1
TABLET ORAL
Qty: 21 EACH | Refills: 0 | Status: SHIPPED | OUTPATIENT
Start: 2019-10-17 | End: 2020-07-31 | Stop reason: ALTCHOICE

## 2019-10-17 RX ORDER — AMOXICILLIN AND CLAVULANATE POTASSIUM 875; 125 MG/1; MG/1
1 TABLET, FILM COATED ORAL EVERY 12 HOURS SCHEDULED
Qty: 14 TABLET | Refills: 0 | Status: SHIPPED | OUTPATIENT
Start: 2019-10-17 | End: 2019-10-24

## 2019-10-17 RX ORDER — MUPIROCIN CALCIUM 20 MG/G
CREAM TOPICAL 3 TIMES DAILY
Qty: 15 G | Refills: 3 | Status: SHIPPED | OUTPATIENT
Start: 2019-10-17 | End: 2020-07-31 | Stop reason: ALTCHOICE

## 2019-10-17 RX ORDER — ZOLPIDEM TARTRATE 6.25 MG/1
6.25 TABLET, FILM COATED, EXTENDED RELEASE ORAL
Qty: 30 TABLET | Refills: 5 | Status: SHIPPED | OUTPATIENT
Start: 2019-10-17 | End: 2021-02-20 | Stop reason: SDUPTHER

## 2019-10-17 RX ORDER — ESCITALOPRAM OXALATE 20 MG/1
20 TABLET ORAL DAILY
Qty: 90 TABLET | Refills: 3 | Status: SHIPPED | OUTPATIENT
Start: 2019-10-17 | End: 2021-02-05 | Stop reason: SDUPTHER

## 2019-10-17 RX ORDER — TERBINAFINE HYDROCHLORIDE 250 MG/1
250 TABLET ORAL EVERY 24 HOURS
Qty: 7 TABLET | Refills: 5 | Status: SHIPPED | OUTPATIENT
Start: 2019-10-17 | End: 2020-01-21

## 2019-10-17 NOTE — PROGRESS NOTES
Assessment/Plan:    No problem-specific Assessment & Plan notes found for this encounter  Diagnoses and all orders for this visit:    Encounter for well adult exam with abnormal findings  Comments:  Completetd today    Screening for colon cancer  Comments:  Pt has cologuard    Attention deficit hyperactivity disorder (ADHD), predominantly inattentive type  Comments:  Vyvance reordered  Orders:  -     lisdexamfetamine (VYVANSE) 60 MG capsule; Take 1 capsule (60 mg total) by mouth every morningMax Daily Amount: 60 mg    Depressive disorder  Comments:  Lexapro reordered  Orders:  -     escitalopram (LEXAPRO) 20 mg tablet; Take 1 tablet (20 mg total) by mouth daily    Anxiety  Comments:  pt denies any complaints  Orders:  -     escitalopram (LEXAPRO) 20 mg tablet; Take 1 tablet (20 mg total) by mouth daily    Other hyperlipidemia  Comments:  Pt counciled    Adult BMI 25 0-25 9 kg/sq m    Current smoker  Comments:  Pt not ready to quit    URI, acute  Comments:  Augmentin Rx and Medrol dose pack ordered  Orders:  -     methylPREDNISolone 4 MG tablet therapy pack; Use as directed on package  -     amoxicillin-clavulanate (AUGMENTIN) 875-125 mg per tablet; Take 1 tablet by mouth every 12 (twelve) hours for 7 days    Toenail fungus  Comments:  Lamisil ordered  Orders:  -     terbinafine (LAMISIL) 250 mg tablet; Take 1 tablet (250 mg total) by mouth every 24 hours for 7 days    Insomnia, unspecified type  -     zolpidem (AMBIEN CR) 6 25 MG CR tablet; Take 1 tablet (6 25 mg total) by mouth daily at bedtime as needed for sleep    Fungal nail infection  Comments:  Elocon cream ordered and Terbinafine for fingernail infection    Rash  -     mupirocin (BACTROBAN) 2 % cream; Apply topically 3 (three) times a day    Other orders  -     Cancel: Cologuard; Future          Subjective:      Patient ID: Sam Rutledge is a 48 y o  female      48 yr old female here with c/o generalized chest congestion, painful chest cough, non productive, no fever, post nasal drip, thick greenish post nasal drainage X5 days, has not missed work, is not using her Albuterol inhaler or Breo continues to smoke and is not interested in quitting  Pt reports the Vyvance is helping her ADHD  The following portions of the patient's history were reviewed and updated as appropriate: She  has a past medical history of Anxiety (5/31/2013), Arthritis, Herpes simplex, IBS (irritable bowel syndrome), Insomnia, Malignant melanoma in situ ([facility] [address] ), and Reactive airway disease  She   Patient Active Problem List    Diagnosis Date Noted    Other hyperlipidemia 10/17/2019    Adult BMI 25 0-25 9 kg/sq m 10/17/2019    Current smoker 10/17/2019    URI, acute 10/17/2019    Attention deficit hyperactivity disorder (ADHD), combined type 06/15/2019    Current every day smoker 06/11/2019    Elevated BP without diagnosis of hypertension 10/23/2018    Acute sinusitis 09/27/2018    Medial epicondylitis, left elbow 02/16/2017    Major depression, single episode 08/11/2016    Constipation 11/03/2014    Depressive disorder 11/03/2014    [facility] ) 11/03/2014    Insomnia 05/31/2013    Irritable bowel syndrome 05/31/2013    Anxiety 05/31/2013     She  has a past surgical history that includes Skin lesion excision (Right, 2013); AUGMENTATION BREAST; and Skin biopsy (2013)  Her family history includes Diabetes in her other; Heart disease in her father; Hyperlipidemia in her other  She  reports that she has been smoking cigarettes  She has been smoking about 1 00 pack per day  She has never used smokeless tobacco  She reports that she drinks alcohol  She reports that she does not use drugs    Current Outpatient Medications   Medication Sig Dispense Refill    albuterol (PROVENTIL HFA,VENTOLIN HFA) 90 mcg/act inhaler TAKE 2 PUFFS BY MOUTH EVERY 6 HOURS AS NEEDED FOR WHEEZE 8 5 Inhaler 5    amoxicillin-clavulanate (AUGMENTIN) 875-125 mg per tablet Take 1 tablet by mouth every 12 (twelve) hours for 7 days 14 tablet 0    escitalopram (LEXAPRO) 20 mg tablet Take 1 tablet (20 mg total) by mouth daily 90 tablet 3    fluticasone (FLONASE) 50 mcg/act nasal spray 2 sprays into each nostril daily (Patient not taking: Reported on 12/27/2018 ) 16 g 0    fluticasone-vilanterol (BREO ELLIPTA) 100-25 mcg/inh inhaler Inhale 1 puff daily Rinse mouth after use  (Patient not taking: Reported on 12/27/2018 ) 1 Inhaler 1    ibuprofen (MOTRIN) 800 mg tablet Take 1 tablet (800 mg total) by mouth every 8 (eight) hours as needed for mild pain 90 tablet 3    lisdexamfetamine (VYVANSE) 60 MG capsule Take 1 capsule (60 mg total) by mouth every morningMax Daily Amount: 60 mg 30 capsule 0    methylPREDNISolone 4 MG tablet therapy pack Use as directed on package 21 each 0    mupirocin (BACTROBAN) 2 % cream Apply topically 3 (three) times a day 15 g 3    PREVIDENT 5000 SENSITIVE 1 1-5 % PSTE BRUSH 1 TIME A DAY BEFORE BED  5    terbinafine (LAMISIL) 250 mg tablet Take 1 tablet (250 mg total) by mouth every 24 hours for 7 days 7 tablet 5    valACYclovir (VALTREX) 1,000 mg tablet TAKE TWO TABS BY MOUTH AT ONSET, THEN TWO TABS 12 HRS  LATER  3    zolpidem (AMBIEN CR) 6 25 MG CR tablet Take 1 tablet (6 25 mg total) by mouth daily at bedtime as needed for sleep 30 tablet 5     No current facility-administered medications for this visit  Current Outpatient Medications on File Prior to Visit   Medication Sig    albuterol (PROVENTIL HFA,VENTOLIN HFA) 90 mcg/act inhaler TAKE 2 PUFFS BY MOUTH EVERY 6 HOURS AS NEEDED FOR WHEEZE    fluticasone (FLONASE) 50 mcg/act nasal spray 2 sprays into each nostril daily (Patient not taking: Reported on 12/27/2018 )    fluticasone-vilanterol (BREO ELLIPTA) 100-25 mcg/inh inhaler Inhale 1 puff daily Rinse mouth after use   (Patient not taking: Reported on 12/27/2018 )    ibuprofen (MOTRIN) 800 mg tablet Take 1 tablet (800 mg total) by mouth every 8 (eight) hours as needed for mild pain    PREVIDENT 5000 SENSITIVE 1 1-5 % PSTE BRUSH 1 TIME A DAY BEFORE BED    valACYclovir (VALTREX) 1,000 mg tablet TAKE TWO TABS BY MOUTH AT ONSET, THEN TWO TABS 12 HRS  LATER    [DISCONTINUED] escitalopram (LEXAPRO) 20 mg tablet Take 1 tablet (20 mg total) by mouth daily    [DISCONTINUED] lisdexamfetamine (VYVANSE) 60 MG capsule Take 1 capsule (60 mg total) by mouth every morningMax Daily Amount: 60 mg    [DISCONTINUED] mupirocin (BACTROBAN) 2 % cream Apply topically 3 (three) times a day    [DISCONTINUED] terbinafine (LAMISIL) 250 mg tablet every 24 hours     No current facility-administered medications on file prior to visit  She is allergic to vilazodone; bacitracin; neomycin; and polymyxin b       Review of Systems       BMI Counseling: Body mass index is 25 63 kg/m²  Discussed the patient's BMI with her  The BMI is above normal  Nutrition recommendations include reducing portion sizes, decreasing overall calorie intake, 3-5 servings of fruits/vegetables daily, reducing fast food intake, consuming healthier snacks, decreasing soda and/or juice intake, moderation in carbohydrate intake, increasing intake of lean protein, reducing intake of saturated fat and trans fat and reducing intake of cholesterol  Exercise recommendations include joining a gym     PHQ-9 Depression Screening    PHQ-9:    Frequency of the following problems over the past two weeks:       Little interest or pleasure in doing things:  0 - not at all  Feeling down, depressed, or hopeless:  0 - not at all  Trouble falling or staying asleep, or sleeping too much:  0 - not at all  Feeling tired or having little energy:  0 - not at all  Poor appetite or overeatin - not at all  Feeling bad about yourself - or that you are a failure or have let yourself or your family down:  0 - not at all  Trouble concentrating on things, such as reading the newspaper or watching television:  0 - not at all  Moving or speaking so slowly that other people could have noticed  Or the opposite - being so fidgety or restless that you have been moving around a lot more than usual:  0 - not at all  Thoughts that you would be better off dead, or of hurting yourself in some way:  0 - not at all  PHQ-2 Score:  0  PHQ-9 Score:  0        Depression Screening Follow-up Plan: Patient's depression screening was negative with a PHQ-2 score of 0  Pt is currently undergoing treatment with Lexapro      Objective:      /80   Temp (!) 96 6 °F (35 9 °C)   Ht 5' 6 25" (1 683 m)   Wt 72 6 kg (160 lb)   BMI 25 63 kg/m²          Physical Exam   Constitutional: She is oriented to person, place, and time  She appears well-developed and well-nourished  HENT:   Head: Normocephalic  Eyes: Pupils are equal, round, and reactive to light  Neck: Normal range of motion  Cardiovascular: Normal rate and regular rhythm  Pulmonary/Chest: Effort normal  She has decreased breath sounds in the right middle field, the right lower field, the left middle field and the left lower field  She has no wheezes  She has no rhonchi  She has no rales  Abdominal: Soft  Bowel sounds are normal    Musculoskeletal: Normal range of motion  Neurological: She is alert and oriented to person, place, and time  Skin: Skin is warm and dry  Psychiatric: She has a normal mood and affect  Her behavior is normal  Judgment and thought content normal    Nursing note and vitals reviewed  Tobacco Cessation Counseling: Tobacco cessation counseling and education was provided  The patient is sincerely urged to quit consumption of tobacco  She is not ready to quit tobacco  The numerous health risks of tobacco consumption were discussed  If she decides to quit, there are a number of helpful adjunctive aids, and she can see me to discuss nicotine replacement therapy, chantix, or bupropion anytime in the future

## 2019-11-21 DIAGNOSIS — F90.0 ATTENTION DEFICIT HYPERACTIVITY DISORDER (ADHD), PREDOMINANTLY INATTENTIVE TYPE: Primary | ICD-10-CM

## 2019-11-21 RX ORDER — TERBINAFINE HYDROCHLORIDE 250 MG/1
250 TABLET ORAL DAILY
Qty: 30 TABLET | Refills: 0 | Status: SHIPPED | OUTPATIENT
Start: 2019-11-21 | End: 2019-12-21

## 2019-12-26 DIAGNOSIS — F90.0 ATTENTION DEFICIT HYPERACTIVITY DISORDER (ADHD), PREDOMINANTLY INATTENTIVE TYPE: ICD-10-CM

## 2020-01-06 DIAGNOSIS — R06.02 SOB (SHORTNESS OF BREATH): ICD-10-CM

## 2020-01-08 RX ORDER — ALBUTEROL SULFATE 90 UG/1
AEROSOL, METERED RESPIRATORY (INHALATION)
Qty: 8.5 INHALER | Refills: 0 | Status: SHIPPED | OUTPATIENT
Start: 2020-01-08 | End: 2020-02-15 | Stop reason: SDUPTHER

## 2020-01-21 DIAGNOSIS — B35.1 TOENAIL FUNGUS: ICD-10-CM

## 2020-01-21 RX ORDER — TERBINAFINE HYDROCHLORIDE 250 MG/1
250 TABLET ORAL EVERY 24 HOURS
Qty: 30 TABLET | Refills: 0 | Status: SHIPPED | OUTPATIENT
Start: 2020-01-21 | End: 2020-02-19

## 2020-01-31 DIAGNOSIS — F90.0 ATTENTION DEFICIT HYPERACTIVITY DISORDER (ADHD), PREDOMINANTLY INATTENTIVE TYPE: ICD-10-CM

## 2020-02-11 ENCOUNTER — OFFICE VISIT (OUTPATIENT)
Dept: FAMILY MEDICINE CLINIC | Facility: CLINIC | Age: 55
End: 2020-02-11
Payer: COMMERCIAL

## 2020-02-11 VITALS
HEIGHT: 66 IN | SYSTOLIC BLOOD PRESSURE: 130 MMHG | WEIGHT: 167 LBS | TEMPERATURE: 96 F | BODY MASS INDEX: 26.84 KG/M2 | DIASTOLIC BLOOD PRESSURE: 88 MMHG

## 2020-02-11 DIAGNOSIS — F90.2 ATTENTION DEFICIT HYPERACTIVITY DISORDER (ADHD), COMBINED TYPE: Primary | ICD-10-CM

## 2020-02-11 DIAGNOSIS — Z12.39 SCREENING FOR BREAST CANCER: ICD-10-CM

## 2020-02-11 DIAGNOSIS — F17.200 CURRENT SMOKER: ICD-10-CM

## 2020-02-11 PROCEDURE — 4004F PT TOBACCO SCREEN RCVD TLK: CPT | Performed by: NURSE PRACTITIONER

## 2020-02-11 PROCEDURE — 3008F BODY MASS INDEX DOCD: CPT | Performed by: NURSE PRACTITIONER

## 2020-02-11 PROCEDURE — 99213 OFFICE O/P EST LOW 20 MIN: CPT | Performed by: NURSE PRACTITIONER

## 2020-02-11 RX ORDER — DEXTROAMPHETAMINE SACCHARATE, AMPHETAMINE ASPARTATE, DEXTROAMPHETAMINE SULFATE AND AMPHETAMINE SULFATE 2.5; 2.5; 2.5; 2.5 MG/1; MG/1; MG/1; MG/1
10 TABLET ORAL
Qty: 60 TABLET | Refills: 0 | Status: SHIPPED | OUTPATIENT
Start: 2020-02-11 | End: 2020-02-15 | Stop reason: CLARIF

## 2020-02-11 NOTE — PROGRESS NOTES
Assessment/Plan:    No problem-specific Assessment & Plan notes found for this encounter  Diagnoses and all orders for this visit:    Attention deficit hyperactivity disorder (ADHD), combined type  Comments:  Continue Vyanase  Orders:  -     amphetamine-dextroamphetamine (ADDERALL) 10 mg tablet; Take 1 tablet (10 mg total) by mouth 2 (two) times a dayMax Daily Amount: 20 mg    Adult BMI 25 0-25 9 kg/sq m  Comments:  Pt counciled on diet and exercise    Current smoker  Comments:  Pt not ready to quit smoking     Screening for breast cancer  Comments:  Mammogram ordered  Orders:  -     Mammo screening bilateral w 3d & cad; Future          Subjective:      Patient ID: Honorio Koch is a 48 y o  female  48 yr old female here for ADHD f/u  Pt cont on Vyanase for ADHD, with relief obtained  Pt is still not interested in quitting smoking  Pt denies any complaints today, lost her dog recently over the past several months  The following portions of the patient's history were reviewed and updated as appropriate: She  has a past medical history of Anxiety (5/31/2013), Arthritis, Herpes simplex, IBS (irritable bowel syndrome), Insomnia, Malignant melanoma in situ ([facility] [address] ), and Reactive airway disease    She   Patient Active Problem List    Diagnosis Date Noted    Other hyperlipidemia 10/17/2019    Adult BMI 25 0-25 9 kg/sq m 10/17/2019    Current smoker 10/17/2019    URI, acute 10/17/2019    Attention deficit hyperactivity disorder (ADHD), combined type 06/15/2019    Current every day smoker 06/11/2019    Elevated BP without diagnosis of hypertension 10/23/2018    Acute sinusitis 09/27/2018    Medial epicondylitis, left elbow 02/16/2017    Major depression, single episode 08/11/2016    Constipation 11/03/2014    Depressive disorder 11/03/2014    Malignant melanoma of skin of trunk ([facility] [address] ) 11/03/2014    Insomnia 05/31/2013    Irritable bowel syndrome 05/31/2013    Anxiety 05/31/2013     She  has a past surgical history that includes Skin lesion excision (Right, 2013); AUGMENTATION BREAST; and Skin biopsy (2013)  Her family history includes Diabetes in her other; Heart disease in her father; Hyperlipidemia in her other  She  reports that she has been smoking cigarettes  She has been smoking about 1 00 pack per day  She has never used smokeless tobacco  She reports that she drinks alcohol  She reports that she does not use drugs  Current Outpatient Medications   Medication Sig Dispense Refill    albuterol (PROVENTIL HFA,VENTOLIN HFA) 90 mcg/act inhaler TAKE 2 PUFFS BY MOUTH EVERY 6 HOURS AS NEEDED FOR WHEEZE 8 5 Inhaler 0    escitalopram (LEXAPRO) 20 mg tablet Take 1 tablet (20 mg total) by mouth daily 90 tablet 3    ibuprofen (MOTRIN) 800 mg tablet Take 1 tablet (800 mg total) by mouth every 8 (eight) hours as needed for mild pain 90 tablet 3    lisdexamfetamine (VYVANSE) 60 MG capsule Take 1 capsule (60 mg total) by mouth every morningMax Daily Amount: 60 mg 30 capsule 0    methylPREDNISolone 4 MG tablet therapy pack Use as directed on package 21 each 0    mupirocin (BACTROBAN) 2 % cream Apply topically 3 (three) times a day 15 g 3    PREVIDENT 5000 SENSITIVE 1 1-5 % PSTE BRUSH 1 TIME A DAY BEFORE BED  5    valACYclovir (VALTREX) 1,000 mg tablet TAKE TWO TABS BY MOUTH AT ONSET, THEN TWO TABS 12 HRS  LATER  3    zolpidem (AMBIEN CR) 6 25 MG CR tablet Take 1 tablet (6 25 mg total) by mouth daily at bedtime as needed for sleep 30 tablet 5    amphetamine-dextroamphetamine (ADDERALL) 10 mg tablet Take 1 tablet (10 mg total) by mouth 2 (two) times a dayMax Daily Amount: 20 mg 60 tablet 0    fluticasone (FLONASE) 50 mcg/act nasal spray 2 sprays into each nostril daily (Patient not taking: Reported on 12/27/2018 ) 16 g 0    fluticasone-vilanterol (BREO ELLIPTA) 100-25 mcg/inh inhaler Inhale 1 puff daily Rinse mouth after use   (Patient not taking: Reported on 12/27/2018 ) 1 Inhaler 1     No current facility-administered medications for this visit  Current Outpatient Medications on File Prior to Visit   Medication Sig    albuterol (PROVENTIL HFA,VENTOLIN HFA) 90 mcg/act inhaler TAKE 2 PUFFS BY MOUTH EVERY 6 HOURS AS NEEDED FOR WHEEZE    escitalopram (LEXAPRO) 20 mg tablet Take 1 tablet (20 mg total) by mouth daily    ibuprofen (MOTRIN) 800 mg tablet Take 1 tablet (800 mg total) by mouth every 8 (eight) hours as needed for mild pain    lisdexamfetamine (VYVANSE) 60 MG capsule Take 1 capsule (60 mg total) by mouth every morningMax Daily Amount: 60 mg    methylPREDNISolone 4 MG tablet therapy pack Use as directed on package    mupirocin (BACTROBAN) 2 % cream Apply topically 3 (three) times a day    PREVIDENT 5000 SENSITIVE 1 1-5 % PSTE BRUSH 1 TIME A DAY BEFORE BED    valACYclovir (VALTREX) 1,000 mg tablet TAKE TWO TABS BY MOUTH AT ONSET, THEN TWO TABS 12 HRS  LATER    zolpidem (AMBIEN CR) 6 25 MG CR tablet Take 1 tablet (6 25 mg total) by mouth daily at bedtime as needed for sleep    fluticasone (FLONASE) 50 mcg/act nasal spray 2 sprays into each nostril daily (Patient not taking: Reported on 12/27/2018 )    fluticasone-vilanterol (BREO ELLIPTA) 100-25 mcg/inh inhaler Inhale 1 puff daily Rinse mouth after use  (Patient not taking: Reported on 12/27/2018 )     No current facility-administered medications on file prior to visit  She is allergic to vilazodone; bacitracin; neomycin; and polymyxin b       Review of Systems   Constitutional: Negative  HENT: Negative  Eyes: Negative  Respiratory: Negative  Cardiovascular: Negative  Gastrointestinal: Negative  Endocrine: Negative  Genitourinary: Negative  Musculoskeletal: Negative  Skin: Negative  Allergic/Immunologic: Negative  Neurological: Negative  Hematological: Negative  Psychiatric/Behavioral: Negative  BMI Counseling: Body mass index is 26 75 kg/m²  Discussed the patient's BMI with her   The BMI is above normal  Nutrition recommendations include reducing portion sizes, decreasing overall calorie intake, 3-5 servings of fruits/vegetables daily, reducing fast food intake, consuming healthier snacks, decreasing soda and/or juice intake, moderation in carbohydrate intake, increasing intake of lean protein, reducing intake of saturated fat and trans fat and reducing intake of cholesterol  Exercise recommendations include exercising 3-5 times per week  Objective:      /88   Temp (!) 96 °F (35 6 °C)   Ht 5' 6 25" (1 683 m)   Wt 75 8 kg (167 lb)   BMI 26 75 kg/m²          Physical Exam   Constitutional: She is oriented to person, place, and time  She appears well-developed and well-nourished  HENT:   Head: Normocephalic  Eyes: Pupils are equal, round, and reactive to light  Neck: Normal range of motion  Cardiovascular: Normal rate and regular rhythm  Pulmonary/Chest: Effort normal and breath sounds normal    Abdominal: Soft  Bowel sounds are normal    Musculoskeletal: Normal range of motion  Neurological: She is alert and oriented to person, place, and time  Skin: Skin is warm and dry  Psychiatric: She has a normal mood and affect  Her behavior is normal  Judgment and thought content normal    Nursing note and vitals reviewed

## 2020-02-15 DIAGNOSIS — R06.2 WHEEZING: ICD-10-CM

## 2020-02-15 DIAGNOSIS — R06.02 SOB (SHORTNESS OF BREATH): ICD-10-CM

## 2020-02-15 DIAGNOSIS — R06.02 SOB (SHORTNESS OF BREATH): Primary | ICD-10-CM

## 2020-02-15 RX ORDER — ALBUTEROL SULFATE 90 UG/1
1 AEROSOL, METERED RESPIRATORY (INHALATION) EVERY 6 HOURS PRN
Qty: 1 INHALER | Refills: 5 | Status: SHIPPED | OUTPATIENT
Start: 2020-02-15 | End: 2020-09-21

## 2020-02-15 RX ORDER — ALBUTEROL SULFATE 90 UG/1
AEROSOL, METERED RESPIRATORY (INHALATION)
Qty: 8.5 INHALER | Refills: 0 | OUTPATIENT
Start: 2020-02-15

## 2020-02-19 DIAGNOSIS — B35.1 TOENAIL FUNGUS: ICD-10-CM

## 2020-02-19 RX ORDER — TERBINAFINE HYDROCHLORIDE 250 MG/1
TABLET ORAL
Qty: 30 TABLET | Refills: 0 | Status: SHIPPED | OUTPATIENT
Start: 2020-02-19 | End: 2020-03-30

## 2020-03-06 ENCOUNTER — TELEPHONE (OUTPATIENT)
Dept: FAMILY MEDICINE CLINIC | Facility: CLINIC | Age: 55
End: 2020-03-06

## 2020-03-06 DIAGNOSIS — F90.0 ATTENTION DEFICIT HYPERACTIVITY DISORDER (ADHD), PREDOMINANTLY INATTENTIVE TYPE: Primary | ICD-10-CM

## 2020-03-06 NOTE — TELEPHONE ENCOUNTER
Patient is coming in on the 19th but is requesting and increase for her adderall   Please advise would you like to increase it now, patient will be out before her next appointment

## 2020-03-06 NOTE — TELEPHONE ENCOUNTER
Please call pt and tell her I now have her up to highest dose 70mg I did send the order to her pharmacy

## 2020-03-10 DIAGNOSIS — F90.2 ATTENTION DEFICIT HYPERACTIVITY DISORDER (ADHD), COMBINED TYPE: Primary | ICD-10-CM

## 2020-03-10 RX ORDER — DEXTROAMPHETAMINE SACCHARATE, AMPHETAMINE ASPARTATE, DEXTROAMPHETAMINE SULFATE AND AMPHETAMINE SULFATE 3.75; 3.75; 3.75; 3.75 MG/1; MG/1; MG/1; MG/1
15 TABLET ORAL 2 TIMES DAILY
Qty: 60 TABLET | Refills: 0 | Status: SHIPPED | OUTPATIENT
Start: 2020-03-10 | End: 2020-07-16 | Stop reason: ALTCHOICE

## 2020-03-30 DIAGNOSIS — B35.1 TOENAIL FUNGUS: ICD-10-CM

## 2020-03-30 RX ORDER — TERBINAFINE HYDROCHLORIDE 250 MG/1
TABLET ORAL
Qty: 30 TABLET | Refills: 0 | Status: SHIPPED | OUTPATIENT
Start: 2020-03-30 | End: 2020-04-28

## 2020-04-28 DIAGNOSIS — B35.1 TOENAIL FUNGUS: ICD-10-CM

## 2020-04-28 RX ORDER — TERBINAFINE HYDROCHLORIDE 250 MG/1
TABLET ORAL
Qty: 30 TABLET | Refills: 0 | Status: SHIPPED | OUTPATIENT
Start: 2020-04-28 | End: 2020-06-08

## 2020-05-19 ENCOUNTER — OFFICE VISIT (OUTPATIENT)
Dept: FAMILY MEDICINE CLINIC | Facility: CLINIC | Age: 55
End: 2020-05-19
Payer: COMMERCIAL

## 2020-05-19 VITALS
BODY MASS INDEX: 26.27 KG/M2 | TEMPERATURE: 96.9 F | DIASTOLIC BLOOD PRESSURE: 84 MMHG | WEIGHT: 164 LBS | SYSTOLIC BLOOD PRESSURE: 132 MMHG

## 2020-05-19 DIAGNOSIS — Z13.1 SCREENING FOR DIABETES MELLITUS (DM): ICD-10-CM

## 2020-05-19 DIAGNOSIS — R53.83 OTHER FATIGUE: ICD-10-CM

## 2020-05-19 DIAGNOSIS — Z13.31 DEPRESSION SCREENING NEGATIVE: ICD-10-CM

## 2020-05-19 DIAGNOSIS — F90.2 ATTENTION DEFICIT HYPERACTIVITY DISORDER (ADHD), COMBINED TYPE: Primary | ICD-10-CM

## 2020-05-19 DIAGNOSIS — Z13.220 SCREENING FOR HYPERLIPIDEMIA: ICD-10-CM

## 2020-05-19 DIAGNOSIS — F41.9 ANXIETY: ICD-10-CM

## 2020-05-19 DIAGNOSIS — Z11.4 SCREENING FOR HIV (HUMAN IMMUNODEFICIENCY VIRUS): ICD-10-CM

## 2020-05-19 PROCEDURE — 99214 OFFICE O/P EST MOD 30 MIN: CPT | Performed by: NURSE PRACTITIONER

## 2020-05-19 PROCEDURE — 4004F PT TOBACCO SCREEN RCVD TLK: CPT | Performed by: NURSE PRACTITIONER

## 2020-06-06 DIAGNOSIS — B35.1 TOENAIL FUNGUS: ICD-10-CM

## 2020-06-08 RX ORDER — TERBINAFINE HYDROCHLORIDE 250 MG/1
TABLET ORAL
Qty: 90 TABLET | Refills: 3 | Status: SHIPPED | OUTPATIENT
Start: 2020-06-08 | End: 2020-09-06

## 2020-07-09 DIAGNOSIS — F90.2 ATTENTION DEFICIT HYPERACTIVITY DISORDER (ADHD), COMBINED TYPE: ICD-10-CM

## 2020-07-16 DIAGNOSIS — F90.2 ATTENTION DEFICIT HYPERACTIVITY DISORDER (ADHD), COMBINED TYPE: Primary | ICD-10-CM

## 2020-07-30 NOTE — PROGRESS NOTES
Assessment/Plan:    No problem-specific Assessment & Plan notes found for this encounter  Diagnoses and all orders for this visit:    Elevated BP without diagnosis of hypertension  Comments:  bp 122/84 today    Attention deficit hyperactivity disorder (ADHD), combined type  Comments:  will continue on current dosage of Vyvance bid  50mg am and 30mg pm  Orders:  -     lisdexamfetamine (VYVANSE) 50 MG capsule; Take 1 capsule (50 mg total) by mouth every morningMax Daily Amount: 50 mg  -     lisdexamfetamine (VYVANSE) 30 MG capsule; Take 1 capsule (30 mg total) by mouth every eveningMax Daily Amount: 30 mg          Subjective:      Patient ID: Rodney Camejo is a 48 y o  female  Medication adjustment    48 yr old female presents for evaluation of increase in Vyvance  Pt states she feels really good, more tired, resting better able to focus and will remain on same dosage  Pt offers no complaints today      The following portions of the patient's history were reviewed and updated as appropriate: She  has a past medical history of Anxiety (5/31/2013), Arthritis, Herpes simplex, IBS (irritable bowel syndrome), Insomnia, Malignant melanoma in situ ([facility] [address] ), and Reactive airway disease    She   Patient Active Problem List    Diagnosis Date Noted    Depression screening negative 05/19/2020    Other hyperlipidemia 10/17/2019    Adult BMI 25 0-25 9 kg/sq m 10/17/2019    Current smoker 10/17/2019    URI, acute 10/17/2019    Attention deficit hyperactivity disorder (ADHD), combined type 06/15/2019    Current every day smoker 06/11/2019    Elevated BP without diagnosis of hypertension 10/23/2018    Acute sinusitis 09/27/2018    Medial epicondylitis, left elbow 02/16/2017    Major depression, single episode 08/11/2016    Constipation 11/03/2014    Depressive disorder 11/03/2014    Malignant melanoma of skin of trunk ([facility] [address] ) 11/03/2014    Insomnia 05/31/2013    Irritable bowel syndrome 05/31/2013    Anxiety 05/31/2013     She  has a past surgical history that includes Skin lesion excision (Right, 2013); AUGMENTATION BREAST; and Skin biopsy (2013)  Her family history includes Diabetes in her other; Heart disease in her father; Hyperlipidemia in her other  She  reports that she has been smoking cigarettes  She has been smoking about 1 00 pack per day  She has never used smokeless tobacco  She reports that she drinks alcohol  She reports that she does not use drugs  Current Outpatient Medications   Medication Sig Dispense Refill    albuterol (PROVENTIL HFA,VENTOLIN HFA) 90 mcg/act inhaler Inhale 1 puff every 6 (six) hours as needed for wheezing 1 Inhaler 5    escitalopram (LEXAPRO) 20 mg tablet Take 1 tablet (20 mg total) by mouth daily 90 tablet 3    lisdexamfetamine (VYVANSE) 30 MG capsule Take 1 capsule (30 mg total) by mouth every eveningMax Daily Amount: 30 mg 30 capsule 0    lisdexamfetamine (VYVANSE) 50 MG capsule Take 1 capsule (50 mg total) by mouth every morningMax Daily Amount: 50 mg 30 capsule 0    PREVIDENT 5000 SENSITIVE 1 1-5 % PSTE BRUSH 1 TIME A DAY BEFORE BED  5    terbinafine (LamISIL) 250 mg tablet TAKE 1 TABLET BY MOUTH EVERY DAY 90 tablet 3    valACYclovir (VALTREX) 1,000 mg tablet TAKE TWO TABS BY MOUTH AT ONSET, THEN TWO TABS 12 HRS  LATER  3    zolpidem (AMBIEN CR) 6 25 MG CR tablet Take 1 tablet (6 25 mg total) by mouth daily at bedtime as needed for sleep 30 tablet 5    fluticasone-vilanterol (BREO ELLIPTA) 100-25 mcg/inh inhaler Inhale 1 puff daily Rinse mouth after use  (Patient not taking: Reported on 12/27/2018 ) 1 Inhaler 1     No current facility-administered medications for this visit        Current Outpatient Medications on File Prior to Visit   Medication Sig    albuterol (PROVENTIL HFA,VENTOLIN HFA) 90 mcg/act inhaler Inhale 1 puff every 6 (six) hours as needed for wheezing    escitalopram (LEXAPRO) 20 mg tablet Take 1 tablet (20 mg total) by mouth daily    PREVIDENT 5000 SENSITIVE 1 1-5 % PSTE BRUSH 1 TIME A DAY BEFORE BED    terbinafine (LamISIL) 250 mg tablet TAKE 1 TABLET BY MOUTH EVERY DAY    valACYclovir (VALTREX) 1,000 mg tablet TAKE TWO TABS BY MOUTH AT ONSET, THEN TWO TABS 12 HRS  LATER    zolpidem (AMBIEN CR) 6 25 MG CR tablet Take 1 tablet (6 25 mg total) by mouth daily at bedtime as needed for sleep    [DISCONTINUED] lisdexamfetamine (VYVANSE) 30 MG capsule Take 1 capsule (30 mg total) by mouth every eveningMax Daily Amount: 30 mg    [DISCONTINUED] lisdexamfetamine (VYVANSE) 50 MG capsule Take 1 capsule (50 mg total) by mouth every morningMax Daily Amount: 50 mg    fluticasone-vilanterol (BREO ELLIPTA) 100-25 mcg/inh inhaler Inhale 1 puff daily Rinse mouth after use  (Patient not taking: Reported on 12/27/2018 )    [DISCONTINUED] fluticasone (FLONASE) 50 mcg/act nasal spray 2 sprays into each nostril daily (Patient not taking: Reported on 12/27/2018 )    [DISCONTINUED] ibuprofen (MOTRIN) 800 mg tablet Take 1 tablet (800 mg total) by mouth every 8 (eight) hours as needed for mild pain (Patient not taking: Reported on 5/19/2020)    [DISCONTINUED] methylPREDNISolone 4 MG tablet therapy pack Use as directed on package (Patient not taking: Reported on 5/19/2020)    [DISCONTINUED] mupirocin (BACTROBAN) 2 % cream Apply topically 3 (three) times a day (Patient not taking: Reported on 5/19/2020)     No current facility-administered medications on file prior to visit  She is allergic to vilazodone; bacitracin; neomycin; and polymyxin b       Review of Systems   Constitutional: Negative  HENT: Negative  Eyes: Negative  Respiratory: Negative  Cardiovascular: Negative  Gastrointestinal: Negative  Endocrine: Negative  Genitourinary: Negative  Musculoskeletal: Negative  Skin: Negative  Allergic/Immunologic: Negative  Neurological: Negative  Hematological: Negative  Psychiatric/Behavioral: Negative               Objective:      BP 122/84   Temp (!) 96 7 °F (35 9 °C)   Ht 5' 6 25" (1 683 m)   Wt 72 1 kg (159 lb)   BMI 25 47 kg/m²          Physical Exam   Constitutional: She is oriented to person, place, and time  She appears well-developed and well-nourished  HENT:   Head: Normocephalic  Eyes: Pupils are equal, round, and reactive to light  Neck: Normal range of motion  Cardiovascular: Normal rate and regular rhythm  Pulmonary/Chest: Effort normal and breath sounds normal    Abdominal: Soft  Bowel sounds are normal    Musculoskeletal: Normal range of motion  Neurological: She is alert and oriented to person, place, and time  Skin: Skin is warm and dry  Psychiatric: She has a normal mood and affect  Her behavior is normal  Judgment and thought content normal    Nursing note and vitals reviewed

## 2020-07-31 ENCOUNTER — OFFICE VISIT (OUTPATIENT)
Dept: FAMILY MEDICINE CLINIC | Facility: CLINIC | Age: 55
End: 2020-07-31
Payer: COMMERCIAL

## 2020-07-31 VITALS
BODY MASS INDEX: 25.55 KG/M2 | TEMPERATURE: 96.7 F | SYSTOLIC BLOOD PRESSURE: 122 MMHG | HEIGHT: 66 IN | DIASTOLIC BLOOD PRESSURE: 84 MMHG | WEIGHT: 159 LBS

## 2020-07-31 DIAGNOSIS — R03.0 ELEVATED BP WITHOUT DIAGNOSIS OF HYPERTENSION: Primary | ICD-10-CM

## 2020-07-31 DIAGNOSIS — F90.2 ATTENTION DEFICIT HYPERACTIVITY DISORDER (ADHD), COMBINED TYPE: ICD-10-CM

## 2020-07-31 PROCEDURE — 3008F BODY MASS INDEX DOCD: CPT | Performed by: NURSE PRACTITIONER

## 2020-07-31 PROCEDURE — 99214 OFFICE O/P EST MOD 30 MIN: CPT | Performed by: NURSE PRACTITIONER

## 2020-07-31 PROCEDURE — 4004F PT TOBACCO SCREEN RCVD TLK: CPT | Performed by: NURSE PRACTITIONER

## 2020-08-21 DIAGNOSIS — L65.9 HAIR LOSS: Primary | ICD-10-CM

## 2020-08-21 RX ORDER — BIMATOPROST 3 UG/ML
2 SOLUTION TOPICAL
Qty: 5 ML | Refills: 6 | Status: SHIPPED | OUTPATIENT
Start: 2020-08-21 | End: 2021-03-18 | Stop reason: SDUPTHER

## 2020-09-15 ENCOUNTER — OFFICE VISIT (OUTPATIENT)
Dept: FAMILY MEDICINE CLINIC | Facility: CLINIC | Age: 55
End: 2020-09-15
Payer: COMMERCIAL

## 2020-09-15 VITALS
BODY MASS INDEX: 25.39 KG/M2 | TEMPERATURE: 96.5 F | HEIGHT: 66 IN | SYSTOLIC BLOOD PRESSURE: 150 MMHG | WEIGHT: 158 LBS | DIASTOLIC BLOOD PRESSURE: 92 MMHG

## 2020-09-15 DIAGNOSIS — J06.9 URI, ACUTE: Primary | ICD-10-CM

## 2020-09-15 DIAGNOSIS — F90.2 ATTENTION DEFICIT HYPERACTIVITY DISORDER (ADHD), COMBINED TYPE: ICD-10-CM

## 2020-09-15 PROCEDURE — 99213 OFFICE O/P EST LOW 20 MIN: CPT | Performed by: NURSE PRACTITIONER

## 2020-09-15 PROCEDURE — 4004F PT TOBACCO SCREEN RCVD TLK: CPT | Performed by: NURSE PRACTITIONER

## 2020-09-15 RX ORDER — AZITHROMYCIN 250 MG/1
TABLET, FILM COATED ORAL
Qty: 6 TABLET | Refills: 1 | Status: SHIPPED | OUTPATIENT
Start: 2020-09-15 | End: 2020-09-19

## 2020-09-15 RX ORDER — DEXTROMETHORPHAN HYDROBROMIDE AND PROMETHAZINE HYDROCHLORIDE 15; 6.25 MG/5ML; MG/5ML
5 SOLUTION ORAL 4 TIMES DAILY PRN
Qty: 240 ML | Refills: 2 | Status: SHIPPED | OUTPATIENT
Start: 2020-09-15 | End: 2021-04-03

## 2020-09-15 RX ORDER — METHYLPREDNISOLONE 4 MG/1
TABLET ORAL
Qty: 21 EACH | Refills: 0 | Status: SHIPPED | OUTPATIENT
Start: 2020-09-15 | End: 2020-12-08

## 2020-09-15 NOTE — PROGRESS NOTES
Assessment/Plan:    No problem-specific Assessment & Plan notes found for this encounter  Diagnoses and all orders for this visit:    URI, acute  Comments:  Rx for Phenergan cough syrup, Z pack and Medrol dose pack provided please continue inhaler as directed  Orders:  -     Promethazine-DM (PHENERGAN-DM) 6 25-15 mg/5 mL oral syrup; Take 5 mL by mouth 4 (four) times a day as needed for cough  -     azithromycin (ZITHROMAX) 250 mg tablet; Take 2 tablets today then 1 tablet daily x 4 days  -     methylPREDNISolone 4 MG tablet therapy pack; Use as directed on package    Attention deficit hyperactivity disorder (ADHD), combined type  Comments:  will continue on current dosage of Vyvance bid  50mg am and 30mg pm  Orders:  -     lisdexamfetamine (VYVANSE) 50 MG capsule; Take 1 capsule (50 mg total) by mouth every morningMax Daily Amount: 50 mg  -     lisdexamfetamine (VYVANSE) 30 MG capsule; Take 1 capsule (30 mg total) by mouth every eveningMax Daily Amount: 30 mg          Subjective:      Patient ID: Abilio Overton is a 48 y o  female  Upper Respiratory Infection  Patient complains of symptoms of a URI  Symptoms include achiness, cough described as nonproductive, facial pain, non productive cough, post nasal drip and tooth pain  Onset of symptoms was several days ago, and has been gradually worsening since that time  Treatment to date: OTC cold medicine  The following portions of the patient's history were reviewed and updated as appropriate: She  has a past medical history of Anxiety (5/31/2013), Arthritis, Herpes simplex, IBS (irritable bowel syndrome), Insomnia, Malignant melanoma in situ ([facility] [address] ), and Reactive airway disease    She   Patient Active Problem List    Diagnosis Date Noted    Depression screening negative 05/19/2020    Other hyperlipidemia 10/17/2019    Adult BMI 25 0-25 9 kg/sq m 10/17/2019    Current smoker 10/17/2019    URI, acute 10/17/2019    Attention deficit hyperactivity disorder (ADHD), combined type 06/15/2019    Current every day smoker 06/11/2019    Elevated BP without diagnosis of hypertension 10/23/2018    Acute sinusitis 09/27/2018    Medial epicondylitis, left elbow 02/16/2017    Major depression, single episode 08/11/2016    Constipation 11/03/2014    Depressive disorder 11/03/2014    Malignant melanoma of skin of trunk ([address] ) 11/03/2014    Insomnia 05/31/2013    Irritable bowel syndrome 05/31/2013    Anxiety 05/31/2013     She  has a past surgical history that includes Skin lesion excision (Right, 2013); AUGMENTATION BREAST; and Skin biopsy (2013)  Her family history includes Diabetes in her other; Heart disease in her father; Hyperlipidemia in her other  She  reports that she has been smoking cigarettes  She has been smoking about 1 00 pack per day  She has never used smokeless tobacco  She reports current alcohol use  She reports that she does not use drugs  Current Outpatient Medications   Medication Sig Dispense Refill    albuterol (PROVENTIL HFA,VENTOLIN HFA) 90 mcg/act inhaler Inhale 1 puff every 6 (six) hours as needed for wheezing 1 Inhaler 5    azithromycin (ZITHROMAX) 250 mg tablet Take 2 tablets today then 1 tablet daily x 4 days 6 tablet 1    bimatoprost (LATISSE) 0 03 % ophthalmic solution Administer 2 drops to both eyes daily at bedtime Place one drop on applicator and apply evenly along the skin of the upper eyelid at base of eyelashes once daily at bedtime; repeat procedure for second eye (use a clean applicator)  5 mL 6    escitalopram (LEXAPRO) 20 mg tablet Take 1 tablet (20 mg total) by mouth daily 90 tablet 3    fluticasone-vilanterol (BREO ELLIPTA) 100-25 mcg/inh inhaler Inhale 1 puff daily Rinse mouth after use   (Patient not taking: Reported on 12/27/2018 ) 1 Inhaler 1    lisdexamfetamine (VYVANSE) 30 MG capsule Take 1 capsule (30 mg total) by mouth every eveningMax Daily Amount: 30 mg 30 capsule 0    lisdexamfetamine (VYVANSE) 50 MG capsule Take 1 capsule (50 mg total) by mouth every morningMax Daily Amount: 50 mg 30 capsule 0    methylPREDNISolone 4 MG tablet therapy pack Use as directed on package 21 each 0    PREVIDENT 5000 SENSITIVE 1 1-5 % PSTE BRUSH 1 TIME A DAY BEFORE BED  5    Promethazine-DM (PHENERGAN-DM) 6 25-15 mg/5 mL oral syrup Take 5 mL by mouth 4 (four) times a day as needed for cough 240 mL 2    valACYclovir (VALTREX) 1,000 mg tablet TAKE TWO TABS BY MOUTH AT ONSET, THEN TWO TABS 12 HRS  LATER  3    zolpidem (AMBIEN CR) 6 25 MG CR tablet Take 1 tablet (6 25 mg total) by mouth daily at bedtime as needed for sleep 30 tablet 5     No current facility-administered medications for this visit  Current Outpatient Medications on File Prior to Visit   Medication Sig    albuterol (PROVENTIL HFA,VENTOLIN HFA) 90 mcg/act inhaler Inhale 1 puff every 6 (six) hours as needed for wheezing    bimatoprost (LATISSE) 0 03 % ophthalmic solution Administer 2 drops to both eyes daily at bedtime Place one drop on applicator and apply evenly along the skin of the upper eyelid at base of eyelashes once daily at bedtime; repeat procedure for second eye (use a clean applicator)   escitalopram (LEXAPRO) 20 mg tablet Take 1 tablet (20 mg total) by mouth daily    fluticasone-vilanterol (BREO ELLIPTA) 100-25 mcg/inh inhaler Inhale 1 puff daily Rinse mouth after use  (Patient not taking: Reported on 12/27/2018 )    PREVIDENT 5000 SENSITIVE 1 1-5 % PSTE BRUSH 1 TIME A DAY BEFORE BED    valACYclovir (VALTREX) 1,000 mg tablet TAKE TWO TABS BY MOUTH AT ONSET, THEN TWO TABS 12 HRS   LATER    zolpidem (AMBIEN CR) 6 25 MG CR tablet Take 1 tablet (6 25 mg total) by mouth daily at bedtime as needed for sleep    [DISCONTINUED] lisdexamfetamine (VYVANSE) 30 MG capsule Take 1 capsule (30 mg total) by mouth every eveningMax Daily Amount: 30 mg    [DISCONTINUED] lisdexamfetamine (VYVANSE) 50 MG capsule Take 1 capsule (50 mg total) by mouth every morningMax Daily Amount: 50 mg     No current facility-administered medications on file prior to visit  She is allergic to vilazodone; bacitracin; neomycin; and polymyxin b       Review of Systems   Constitutional: Negative for chills and fever  HENT: Positive for congestion, ear pain, hearing loss, postnasal drip, rhinorrhea, sinus pressure, sinus pain and sneezing  Negative for sore throat  Respiratory: Positive for cough  Neurological: Positive for headaches  Objective:      /92   Temp (!) 96 5 °F (35 8 °C)   Ht 5' 6 25" (1 683 m)   Wt 71 7 kg (158 lb)   BMI 25 31 kg/m²          Physical Exam  Constitutional:       Appearance: She is ill-appearing  HENT:      Right Ear: No middle ear effusion  Tympanic membrane is not erythematous  Left Ear:  No middle ear effusion  Tympanic membrane is not erythematous  Nose: Mucosal edema and rhinorrhea present  Right Sinus: Maxillary sinus tenderness and frontal sinus tenderness present  Left Sinus: Maxillary sinus tenderness and frontal sinus tenderness present  Mouth/Throat:      Pharynx: No posterior oropharyngeal erythema  Pulmonary:      Effort: Pulmonary effort is normal       Breath sounds: Examination of the right-middle field reveals rhonchi  Examination of the right-lower field reveals rhonchi  Decreased breath sounds and rhonchi present  Lymphadenopathy:      Head:      Left side of head: No submandibular adenopathy

## 2020-09-20 DIAGNOSIS — R06.2 WHEEZING: ICD-10-CM

## 2020-09-20 DIAGNOSIS — R06.02 SOB (SHORTNESS OF BREATH): ICD-10-CM

## 2020-09-21 RX ORDER — ALBUTEROL SULFATE 90 UG/1
AEROSOL, METERED RESPIRATORY (INHALATION)
Qty: 8.5 INHALER | Refills: 0 | Status: SHIPPED | OUTPATIENT
Start: 2020-09-21 | End: 2020-10-27

## 2020-10-23 DIAGNOSIS — R06.2 WHEEZING: ICD-10-CM

## 2020-10-23 DIAGNOSIS — R06.02 SOB (SHORTNESS OF BREATH): ICD-10-CM

## 2020-10-27 RX ORDER — ALBUTEROL SULFATE 90 UG/1
AEROSOL, METERED RESPIRATORY (INHALATION)
Qty: 6.7 INHALER | Refills: 5 | Status: SHIPPED | OUTPATIENT
Start: 2020-10-27

## 2020-12-03 ENCOUNTER — OFFICE VISIT (OUTPATIENT)
Dept: INTERNAL MEDICINE CLINIC | Facility: CLINIC | Age: 55
End: 2020-12-03
Payer: COMMERCIAL

## 2020-12-03 ENCOUNTER — TELEPHONE (OUTPATIENT)
Dept: INTERNAL MEDICINE CLINIC | Facility: CLINIC | Age: 55
End: 2020-12-03

## 2020-12-03 DIAGNOSIS — Z12.31 ENCOUNTER FOR SCREENING MAMMOGRAM FOR MALIGNANT NEOPLASM OF BREAST: Primary | ICD-10-CM

## 2020-12-03 DIAGNOSIS — B34.9 VIRAL INFECTION, UNSPECIFIED: ICD-10-CM

## 2020-12-03 DIAGNOSIS — F90.2 ATTENTION DEFICIT HYPERACTIVITY DISORDER (ADHD), COMBINED TYPE: ICD-10-CM

## 2020-12-03 PROCEDURE — 3725F SCREEN DEPRESSION PERFORMED: CPT | Performed by: PHYSICIAN ASSISTANT

## 2020-12-03 PROCEDURE — U0003 INFECTIOUS AGENT DETECTION BY NUCLEIC ACID (DNA OR RNA); SEVERE ACUTE RESPIRATORY SYNDROME CORONAVIRUS 2 (SARS-COV-2) (CORONAVIRUS DISEASE [COVID-19]), AMPLIFIED PROBE TECHNIQUE, MAKING USE OF HIGH THROUGHPUT TECHNOLOGIES AS DESCRIBED BY CMS-2020-01-R: HCPCS | Performed by: PHYSICIAN ASSISTANT

## 2020-12-03 PROCEDURE — 99213 OFFICE O/P EST LOW 20 MIN: CPT | Performed by: PHYSICIAN ASSISTANT

## 2020-12-06 ENCOUNTER — TELEMEDICINE (OUTPATIENT)
Dept: INTERNAL MEDICINE CLINIC | Facility: CLINIC | Age: 55
End: 2020-12-06
Payer: COMMERCIAL

## 2020-12-06 DIAGNOSIS — U07.1 COVID-19: Primary | ICD-10-CM

## 2020-12-06 LAB — SARS-COV-2 RNA SPEC QL NAA+PROBE: DETECTED

## 2020-12-06 PROCEDURE — 99213 OFFICE O/P EST LOW 20 MIN: CPT | Performed by: PHYSICIAN ASSISTANT

## 2020-12-07 ENCOUNTER — TELEPHONE (OUTPATIENT)
Dept: INTERNAL MEDICINE CLINIC | Facility: CLINIC | Age: 55
End: 2020-12-07

## 2020-12-08 ENCOUNTER — TELEMEDICINE (OUTPATIENT)
Dept: INTERNAL MEDICINE CLINIC | Facility: CLINIC | Age: 55
End: 2020-12-08
Payer: COMMERCIAL

## 2020-12-08 ENCOUNTER — TELEPHONE (OUTPATIENT)
Dept: INTERNAL MEDICINE CLINIC | Facility: CLINIC | Age: 55
End: 2020-12-08

## 2020-12-08 VITALS — BODY MASS INDEX: 25.39 KG/M2 | WEIGHT: 158 LBS | HEIGHT: 66 IN

## 2020-12-08 DIAGNOSIS — U07.1 COVID-19: Primary | ICD-10-CM

## 2020-12-08 PROCEDURE — 99213 OFFICE O/P EST LOW 20 MIN: CPT | Performed by: PHYSICIAN ASSISTANT

## 2020-12-08 RX ORDER — ONDANSETRON 4 MG/1
4 TABLET, FILM COATED ORAL EVERY 8 HOURS PRN
Qty: 20 TABLET | Refills: 0 | Status: SHIPPED | OUTPATIENT
Start: 2020-12-08 | End: 2021-04-03

## 2020-12-10 ENCOUNTER — TELEMEDICINE (OUTPATIENT)
Dept: INTERNAL MEDICINE CLINIC | Facility: CLINIC | Age: 55
End: 2020-12-10
Payer: COMMERCIAL

## 2020-12-10 VITALS — HEIGHT: 66 IN | TEMPERATURE: 97.4 F | WEIGHT: 158 LBS | BODY MASS INDEX: 25.39 KG/M2

## 2020-12-10 DIAGNOSIS — U07.1 COVID-19: Primary | ICD-10-CM

## 2020-12-10 PROCEDURE — 99213 OFFICE O/P EST LOW 20 MIN: CPT | Performed by: PHYSICIAN ASSISTANT

## 2020-12-10 PROCEDURE — 4004F PT TOBACCO SCREEN RCVD TLK: CPT | Performed by: PHYSICIAN ASSISTANT

## 2020-12-10 PROCEDURE — 3008F BODY MASS INDEX DOCD: CPT | Performed by: PHYSICIAN ASSISTANT

## 2021-02-05 DIAGNOSIS — F41.9 ANXIETY: ICD-10-CM

## 2021-02-05 DIAGNOSIS — F32.A DEPRESSIVE DISORDER: ICD-10-CM

## 2021-02-05 RX ORDER — ESCITALOPRAM OXALATE 20 MG/1
20 TABLET ORAL DAILY
Qty: 90 TABLET | Refills: 3 | Status: SHIPPED | OUTPATIENT
Start: 2021-02-05 | End: 2022-01-06

## 2021-02-20 ENCOUNTER — OFFICE VISIT (OUTPATIENT)
Dept: INTERNAL MEDICINE CLINIC | Facility: CLINIC | Age: 56
End: 2021-02-20
Payer: COMMERCIAL

## 2021-02-20 VITALS
OXYGEN SATURATION: 79 % | HEIGHT: 66 IN | BODY MASS INDEX: 26.9 KG/M2 | SYSTOLIC BLOOD PRESSURE: 130 MMHG | HEART RATE: 97 BPM | TEMPERATURE: 96.5 F | DIASTOLIC BLOOD PRESSURE: 80 MMHG | WEIGHT: 167.38 LBS

## 2021-02-20 DIAGNOSIS — Z13.0 SCREENING FOR DEFICIENCY ANEMIA: ICD-10-CM

## 2021-02-20 DIAGNOSIS — M25.561 CHRONIC PAIN OF RIGHT KNEE: Primary | ICD-10-CM

## 2021-02-20 DIAGNOSIS — Z13.220 SCREENING, LIPID: ICD-10-CM

## 2021-02-20 DIAGNOSIS — E78.49 OTHER HYPERLIPIDEMIA: ICD-10-CM

## 2021-02-20 DIAGNOSIS — G47.00 INSOMNIA, UNSPECIFIED TYPE: ICD-10-CM

## 2021-02-20 DIAGNOSIS — E55.9 VITAMIN D DEFICIENCY: ICD-10-CM

## 2021-02-20 DIAGNOSIS — Z12.31 ENCOUNTER FOR SCREENING MAMMOGRAM FOR MALIGNANT NEOPLASM OF BREAST: ICD-10-CM

## 2021-02-20 DIAGNOSIS — Z13.29 SCREENING FOR THYROID DISORDER: ICD-10-CM

## 2021-02-20 DIAGNOSIS — Z13.1 SCREENING FOR DIABETES MELLITUS: ICD-10-CM

## 2021-02-20 DIAGNOSIS — G89.29 CHRONIC PAIN OF RIGHT KNEE: Primary | ICD-10-CM

## 2021-02-20 PROBLEM — F17.200 CURRENT EVERY DAY SMOKER: Status: RESOLVED | Noted: 2019-06-11 | Resolved: 2021-02-20

## 2021-02-20 PROBLEM — Z13.31 DEPRESSION SCREENING NEGATIVE: Status: RESOLVED | Noted: 2020-05-19 | Resolved: 2021-02-20

## 2021-02-20 PROBLEM — M77.02 MEDIAL EPICONDYLITIS, LEFT ELBOW: Status: RESOLVED | Noted: 2017-02-16 | Resolved: 2021-02-20

## 2021-02-20 PROBLEM — J06.9 URI, ACUTE: Status: RESOLVED | Noted: 2019-10-17 | Resolved: 2021-02-20

## 2021-02-20 PROBLEM — J01.90 ACUTE SINUSITIS: Status: RESOLVED | Noted: 2018-09-27 | Resolved: 2021-02-20

## 2021-02-20 PROCEDURE — 3725F SCREEN DEPRESSION PERFORMED: CPT | Performed by: PHYSICIAN ASSISTANT

## 2021-02-20 PROCEDURE — 99214 OFFICE O/P EST MOD 30 MIN: CPT | Performed by: PHYSICIAN ASSISTANT

## 2021-02-20 RX ORDER — ZOLPIDEM TARTRATE 6.25 MG/1
6.25 TABLET, FILM COATED, EXTENDED RELEASE ORAL
Qty: 30 TABLET | Refills: 5 | Status: SHIPPED | OUTPATIENT
Start: 2021-02-20 | End: 2022-02-07 | Stop reason: SDUPTHER

## 2021-02-20 NOTE — ASSESSMENT & PLAN NOTE
Symptom cluster suspicious for possible meniscus issue  Pt would like MRI  Discussed PT/xray first  Pt informed surgery may not be required even if meniscus issue confirmed  Right now symptoms are intermittent and defers medication for this

## 2021-02-20 NOTE — PROGRESS NOTES
Assessment/Plan:  Problem List Items Addressed This Visit        Other    Insomnia    Relevant Medications    zolpidem (AMBIEN CR) 6 25 MG CR tablet    Other hyperlipidemia    Relevant Orders    Lipid Panel with Direct LDL reflex    Chronic pain of right knee - Primary     Symptom cluster suspicious for possible meniscus issue  Pt would like MRI  Discussed PT/xray first  Pt informed surgery may not be required even if meniscus issue confirmed  Right now symptoms are intermittent and defers medication for this  Relevant Orders    XR knee 3 vw right non injury    Ambulatory referral to Orthopedic Surgery      Other Visit Diagnoses     Encounter for screening mammogram for malignant neoplasm of breast        Relevant Orders    Mammo screening bilateral w 3d & cad    Vitamin D deficiency        Relevant Orders    Vitamin D 25 hydroxy    Screening, lipid        Screening for diabetes mellitus        Relevant Orders    Comprehensive metabolic panel    Screening for thyroid disorder        Relevant Orders    TSH, 3rd generation with Free T4 reflex    Screening for deficiency anemia        Relevant Orders    CBC and differential           Diagnoses and all orders for this visit:    Chronic pain of right knee  -     XR knee 3 vw right non injury; Future  -     Ambulatory referral to Orthopedic Surgery; Future    Encounter for screening mammogram for malignant neoplasm of breast  -     Mammo screening bilateral w 3d & cad; Future    Insomnia, unspecified type  -     zolpidem (AMBIEN CR) 6 25 MG CR tablet; Take 1 tablet (6 25 mg total) by mouth daily at bedtime as needed for sleep    Other hyperlipidemia  -     Lipid Panel with Direct LDL reflex; Future    Vitamin D deficiency  -     Vitamin D 25 hydroxy; Future    Screening, lipid    Screening for diabetes mellitus  -     Comprehensive metabolic panel; Future    Screening for thyroid disorder  -     TSH, 3rd generation with Free T4 reflex;  Future    Screening for deficiency anemia  -     CBC and differential; Future        Chronic pain of right knee  Symptom cluster suspicious for possible meniscus issue  Pt would like MRI  Discussed PT/xray first  Pt informed surgery may not be required even if meniscus issue confirmed  Right now symptoms are intermittent and defers medication for this  Subjective:      Patient ID: Elizabeth Huff is a 50 y o  female  Pt presents for follow up  She is feeling improved since her covid infection  She is due for labs, mammogram, pap and CRC Screening  She defers CRC Screening at the moment  She complains of knee pain as outlined below  Knee Pain   The incident occurred more than 1 week ago  There was no injury mechanism  The pain is present in the right knee  The quality of the pain is described as stabbing  The pain is at a severity of 5/10  The pain is moderate  The pain has been intermittent since onset  Pertinent negatives include no loss of motion, muscle weakness, numbness or tingling  She reports no foreign bodies present  The symptoms are aggravated by weight bearing and movement  She has tried nothing for the symptoms  The treatment provided mild relief  The following portions of the patient's history were reviewed and updated as appropriate:   She has a past medical history of Anxiety (5/31/2013), Arthritis, Herpes simplex, IBS (irritable bowel syndrome), Insomnia, Malignant melanoma in situ ([facility] [address] ), and Reactive airway disease  ,  does not have any pertinent problems on file  ,   has a past surgical history that includes Skin lesion excision (Right, 2013); AUGMENTATION BREAST; and Skin biopsy (2013)  ,  family history includes Diabetes in her other; Heart disease in her father; Hyperlipidemia in her other  ,   reports that she has been smoking cigarettes  She has been smoking about 1 00 pack per day  She has never used smokeless tobacco  She reports current alcohol use  She reports that she does not use drugs  ,  is allergic to vilazodone; bacitracin; neomycin; and polymyxin b     Current Outpatient Medications   Medication Sig Dispense Refill    albuterol (PROVENTIL HFA,VENTOLIN HFA) 90 mcg/act inhaler INHALE 1 PUFF BY MOUTH EVERY 6 HOURS AS NEEDED FOR WHEEZE 6 7 Inhaler 5    escitalopram (Lexapro) 20 mg tablet Take 1 tablet (20 mg total) by mouth daily 90 tablet 3    lisdexamfetamine (VYVANSE) 30 MG capsule Take 1 capsule (30 mg total) by mouth every eveningMax Daily Amount: 30 mg 30 capsule 0    lisdexamfetamine (VYVANSE) 50 MG capsule Take 1 capsule (50 mg total) by mouth every morningMax Daily Amount: 50 mg 30 capsule 0    valACYclovir (VALTREX) 1,000 mg tablet TAKE TWO TABS BY MOUTH AT ONSET, THEN TWO TABS 12 HRS  LATER  3    zolpidem (AMBIEN CR) 6 25 MG CR tablet Take 1 tablet (6 25 mg total) by mouth daily at bedtime as needed for sleep 30 tablet 5    bimatoprost (LATISSE) 0 03 % ophthalmic solution Administer 2 drops to both eyes daily at bedtime Place one drop on applicator and apply evenly along the skin of the upper eyelid at base of eyelashes once daily at bedtime; repeat procedure for second eye (use a clean applicator)  5 mL 6    ondansetron (ZOFRAN) 4 mg tablet Take 1 tablet (4 mg total) by mouth every 8 (eight) hours as needed for nausea or vomiting 20 tablet 0    PREVIDENT 5000 SENSITIVE 1 1-5 % PSTE BRUSH 1 TIME A DAY BEFORE BED  5    Promethazine-DM (PHENERGAN-DM) 6 25-15 mg/5 mL oral syrup Take 5 mL by mouth 4 (four) times a day as needed for cough (Patient not taking: Reported on 12/8/2020) 240 mL 2     No current facility-administered medications for this visit  Review of Systems   Constitutional: Negative for chills and fever  HENT: Negative for congestion, ear pain, hearing loss, postnasal drip, rhinorrhea, sinus pressure, sinus pain, sore throat and trouble swallowing  Eyes: Negative for pain and visual disturbance     Respiratory: Negative for cough, chest tightness, shortness of breath and wheezing  Cardiovascular: Negative  Negative for chest pain, palpitations and leg swelling  Gastrointestinal: Negative for abdominal pain, blood in stool, constipation, diarrhea, nausea and vomiting  Endocrine: Negative for cold intolerance, heat intolerance, polydipsia, polyphagia and polyuria  Genitourinary: Negative for difficulty urinating, dysuria, flank pain and urgency  Musculoskeletal: Positive for arthralgias  Negative for back pain, gait problem and myalgias  Skin: Negative for rash  Allergic/Immunologic: Negative  Neurological: Negative for dizziness, tingling, weakness, light-headedness, numbness and headaches  Hematological: Negative  Psychiatric/Behavioral: Negative for behavioral problems, dysphoric mood and sleep disturbance  The patient is not nervous/anxious  PHQ-9 Depression Screening    PHQ-9:   Frequency of the following problems over the past two weeks:      Little interest or pleasure in doing things: 0 - not at all  Feeling down, depressed, or hopeless: 3 - nearly every day  Trouble falling or staying asleep, or sleeping too much: 2 - more than half the days  Feeling tired or having little energy: 2 - more than half the days  Poor appetite or overeating: 3 - nearly every day  Feeling bad about yourself - or that you are a failure or have let yourself or your family down: 3 - nearly every day  Trouble concentrating on things, such as reading the newspaper or watching television: 1 - several days  Moving or speaking so slowly that other people could have noticed   Or the opposite - being so fidgety or restless that you have been moving around a lot more than usual: 2 - more than half the days  Thoughts that you would be better off dead, or of hurting yourself in some way: 0 - not at all  PHQ-2 Score: 3  PHQ-9 Score: 16          Objective:  Vitals:    02/20/21 1059   BP: 130/80   Pulse: 97   Temp: (!) 96 5 °F (35 8 °C)   SpO2: (!) 79%   Weight: 75 9 kg (167 lb 6 oz)   Height: 5' 6 25" (1 683 m)     Body mass index is 26 81 kg/m²  Physical Exam  Constitutional:       General: She is not in acute distress  Appearance: She is well-developed  She is not diaphoretic  HENT:      Head: Normocephalic and atraumatic  Right Ear: External ear normal       Left Ear: External ear normal       Nose: Nose normal       Mouth/Throat:      Pharynx: No oropharyngeal exudate  Eyes:      General: No scleral icterus  Right eye: No discharge  Left eye: No discharge  Conjunctiva/sclera: Conjunctivae normal       Pupils: Pupils are equal, round, and reactive to light  Neck:      Musculoskeletal: Normal range of motion and neck supple  Thyroid: No thyromegaly  Cardiovascular:      Rate and Rhythm: Normal rate and regular rhythm  Heart sounds: Normal heart sounds  No murmur  No friction rub  No gallop  Pulmonary:      Effort: Pulmonary effort is normal  No respiratory distress  Breath sounds: Normal breath sounds  No wheezing or rales  Abdominal:      General: Bowel sounds are normal  There is no distension  Palpations: Abdomen is soft  Tenderness: There is no abdominal tenderness  Musculoskeletal: Normal range of motion  General: No tenderness or deformity  Skin:     General: Skin is warm and dry  Neurological:      Mental Status: She is alert and oriented to person, place, and time  Cranial Nerves: No cranial nerve deficit  Psychiatric:         Behavior: Behavior normal          Thought Content:  Thought content normal          Judgment: Judgment normal

## 2021-02-26 ENCOUNTER — APPOINTMENT (OUTPATIENT)
Dept: RADIOLOGY | Facility: CLINIC | Age: 56
End: 2021-02-26
Payer: COMMERCIAL

## 2021-02-26 ENCOUNTER — LAB (OUTPATIENT)
Dept: LAB | Facility: CLINIC | Age: 56
End: 2021-02-26
Payer: COMMERCIAL

## 2021-02-26 DIAGNOSIS — E55.9 VITAMIN D DEFICIENCY: ICD-10-CM

## 2021-02-26 DIAGNOSIS — Z13.29 SCREENING FOR THYROID DISORDER: ICD-10-CM

## 2021-02-26 DIAGNOSIS — Z13.0 SCREENING FOR DEFICIENCY ANEMIA: ICD-10-CM

## 2021-02-26 DIAGNOSIS — M25.561 CHRONIC PAIN OF RIGHT KNEE: ICD-10-CM

## 2021-02-26 DIAGNOSIS — E78.49 OTHER HYPERLIPIDEMIA: ICD-10-CM

## 2021-02-26 DIAGNOSIS — Z13.1 SCREENING FOR DIABETES MELLITUS: ICD-10-CM

## 2021-02-26 DIAGNOSIS — G89.29 CHRONIC PAIN OF RIGHT KNEE: ICD-10-CM

## 2021-02-26 LAB
25(OH)D3 SERPL-MCNC: 29.3 NG/ML (ref 30–100)
ALBUMIN SERPL BCP-MCNC: 4 G/DL (ref 3.5–5)
ALP SERPL-CCNC: 56 U/L (ref 46–116)
ALT SERPL W P-5'-P-CCNC: 28 U/L (ref 12–78)
ANION GAP SERPL CALCULATED.3IONS-SCNC: 5 MMOL/L (ref 4–13)
AST SERPL W P-5'-P-CCNC: 15 U/L (ref 5–45)
BASOPHILS # BLD AUTO: 0.04 THOUSANDS/ΜL (ref 0–0.1)
BASOPHILS NFR BLD AUTO: 1 % (ref 0–1)
BILIRUB SERPL-MCNC: 0.45 MG/DL (ref 0.2–1)
BUN SERPL-MCNC: 11 MG/DL (ref 5–25)
CALCIUM SERPL-MCNC: 9.2 MG/DL (ref 8.3–10.1)
CHLORIDE SERPL-SCNC: 108 MMOL/L (ref 100–108)
CHOLEST SERPL-MCNC: 219 MG/DL (ref 50–200)
CO2 SERPL-SCNC: 28 MMOL/L (ref 21–32)
CREAT SERPL-MCNC: 0.7 MG/DL (ref 0.6–1.3)
EOSINOPHIL # BLD AUTO: 0.21 THOUSAND/ΜL (ref 0–0.61)
EOSINOPHIL NFR BLD AUTO: 3 % (ref 0–6)
ERYTHROCYTE [DISTWIDTH] IN BLOOD BY AUTOMATED COUNT: 13 % (ref 11.6–15.1)
GFR SERPL CREATININE-BSD FRML MDRD: 99 ML/MIN/1.73SQ M
GLUCOSE P FAST SERPL-MCNC: 88 MG/DL (ref 65–99)
HCT VFR BLD AUTO: 43 % (ref 34.8–46.1)
HDLC SERPL-MCNC: 77 MG/DL
HGB BLD-MCNC: 13.9 G/DL (ref 11.5–15.4)
IMM GRANULOCYTES # BLD AUTO: 0.01 THOUSAND/UL (ref 0–0.2)
IMM GRANULOCYTES NFR BLD AUTO: 0 % (ref 0–2)
LDLC SERPL CALC-MCNC: 129 MG/DL (ref 0–100)
LYMPHOCYTES # BLD AUTO: 2.31 THOUSANDS/ΜL (ref 0.6–4.47)
LYMPHOCYTES NFR BLD AUTO: 37 % (ref 14–44)
MCH RBC QN AUTO: 29.3 PG (ref 26.8–34.3)
MCHC RBC AUTO-ENTMCNC: 32.3 G/DL (ref 31.4–37.4)
MCV RBC AUTO: 91 FL (ref 82–98)
MONOCYTES # BLD AUTO: 0.46 THOUSAND/ΜL (ref 0.17–1.22)
MONOCYTES NFR BLD AUTO: 7 % (ref 4–12)
NEUTROPHILS # BLD AUTO: 3.22 THOUSANDS/ΜL (ref 1.85–7.62)
NEUTS SEG NFR BLD AUTO: 52 % (ref 43–75)
NRBC BLD AUTO-RTO: 0 /100 WBCS
PLATELET # BLD AUTO: 221 THOUSANDS/UL (ref 149–390)
PMV BLD AUTO: 12.1 FL (ref 8.9–12.7)
POTASSIUM SERPL-SCNC: 4.4 MMOL/L (ref 3.5–5.3)
PROT SERPL-MCNC: 6.8 G/DL (ref 6.4–8.2)
RBC # BLD AUTO: 4.75 MILLION/UL (ref 3.81–5.12)
SODIUM SERPL-SCNC: 141 MMOL/L (ref 136–145)
TRIGL SERPL-MCNC: 63 MG/DL
TSH SERPL DL<=0.05 MIU/L-ACNC: 2.08 UIU/ML (ref 0.36–3.74)
WBC # BLD AUTO: 6.25 THOUSAND/UL (ref 4.31–10.16)

## 2021-02-26 PROCEDURE — 73562 X-RAY EXAM OF KNEE 3: CPT

## 2021-02-26 PROCEDURE — 80061 LIPID PANEL: CPT

## 2021-02-26 PROCEDURE — 84443 ASSAY THYROID STIM HORMONE: CPT

## 2021-02-26 PROCEDURE — 82306 VITAMIN D 25 HYDROXY: CPT

## 2021-02-26 PROCEDURE — 85025 COMPLETE CBC W/AUTO DIFF WBC: CPT

## 2021-02-26 PROCEDURE — 80053 COMPREHEN METABOLIC PANEL: CPT

## 2021-02-26 PROCEDURE — 36415 COLL VENOUS BLD VENIPUNCTURE: CPT

## 2021-03-02 ENCOUNTER — CONSULT (OUTPATIENT)
Dept: OBGYN CLINIC | Facility: CLINIC | Age: 56
End: 2021-03-02
Payer: COMMERCIAL

## 2021-03-02 VITALS
SYSTOLIC BLOOD PRESSURE: 163 MMHG | BODY MASS INDEX: 26.84 KG/M2 | DIASTOLIC BLOOD PRESSURE: 93 MMHG | WEIGHT: 167 LBS | HEART RATE: 105 BPM | HEIGHT: 66 IN

## 2021-03-02 DIAGNOSIS — G89.29 CHRONIC PAIN OF RIGHT KNEE: ICD-10-CM

## 2021-03-02 DIAGNOSIS — M25.561 CHRONIC PAIN OF RIGHT KNEE: ICD-10-CM

## 2021-03-02 DIAGNOSIS — S83.241A ACUTE MEDIAL MENISCUS TEAR OF RIGHT KNEE, INITIAL ENCOUNTER: Primary | ICD-10-CM

## 2021-03-02 PROCEDURE — 99243 OFF/OP CNSLTJ NEW/EST LOW 30: CPT | Performed by: ORTHOPAEDIC SURGERY

## 2021-03-02 NOTE — PROGRESS NOTES
Assessment/Plan:    No problem-specific Assessment & Plan notes found for this encounter  Diagnoses and all orders for this visit:    Acute medial meniscus tear of right knee, initial encounter  -     MRI knee right  wo contrast; Future    Chronic pain of right knee  -     Ambulatory referral to Orthopedic Surgery           an MRI is ordered to rule out meniscal pathology  She returned back once the MRI is complete  Subjective:      Patient ID: Juanjose Haskins is a 50 y o  female  HPI       The patient has had pain in her right knee for least the past year  She denies any specific trauma  She denies any numbness or tingling  She denies any fever chills  At times, her knee does feel unstable  She has had x-rays  The following portions of the patient's history were reviewed and updated as appropriate: allergies, current medications, past family history, past medical history, past social history, past surgical history and problem list     Review of Systems   Constitutional: Negative for chills, fever and unexpected weight change  HENT: Negative for hearing loss, nosebleeds and sore throat  Eyes: Negative for pain, redness and visual disturbance  Respiratory: Negative for cough, shortness of breath and wheezing  Cardiovascular: Negative for chest pain, palpitations and leg swelling  Gastrointestinal: Negative for abdominal pain, nausea and vomiting  Endocrine: Negative for polydipsia and polyuria  Genitourinary: Negative for dysuria and hematuria  Musculoskeletal: Positive for arthralgias, gait problem and myalgias  Negative for back pain, joint swelling, neck pain and neck stiffness  As noted in HPI   Skin: Negative for rash and wound  Neurological: Negative for dizziness, numbness and headaches  Psychiatric/Behavioral: Negative for decreased concentration and suicidal ideas  The patient is not nervous/anxious            Objective:      /93 (BP Location: Right arm, Patient Position: Sitting, Cuff Size: Standard)   Pulse 105   Ht 5' 6 25" (1 683 m)   Wt 75 8 kg (167 lb)   BMI 26 75 kg/m²          Physical Exam        Right lower extremity is neurovascular intact  Toes are pink and mobile  Compartments are soft  Range of motion is from 5-115 degrees  There is negative Lachman's, drawer, pivot shift test   There is medial joint tenderness with a positive Raza's  There is less lateral joint tenderness  Patellofemoral crepitation is present  Negative Homans  Completely neurologically intact        X-rays show mild arthritic changes

## 2021-03-08 ENCOUNTER — HOSPITAL ENCOUNTER (OUTPATIENT)
Dept: MRI IMAGING | Facility: HOSPITAL | Age: 56
Discharge: HOME | End: 2021-03-08
Attending: ORTHOPAEDIC SURGERY
Payer: COMMERCIAL

## 2021-03-08 DIAGNOSIS — S83.241A ACUTE MEDIAL MENISCUS TEAR OF RIGHT KNEE, INITIAL ENCOUNTER: ICD-10-CM

## 2021-03-08 PROCEDURE — 73721 MRI JNT OF LWR EXTRE W/O DYE: CPT

## 2021-03-08 PROCEDURE — G1004 CDSM NDSC: HCPCS

## 2021-03-16 ENCOUNTER — PREP FOR PROCEDURE (OUTPATIENT)
Dept: OBGYN CLINIC | Facility: CLINIC | Age: 56
End: 2021-03-16

## 2021-03-16 ENCOUNTER — OFFICE VISIT (OUTPATIENT)
Dept: OBGYN CLINIC | Facility: CLINIC | Age: 56
End: 2021-03-16
Payer: COMMERCIAL

## 2021-03-16 VITALS
SYSTOLIC BLOOD PRESSURE: 147 MMHG | HEART RATE: 71 BPM | WEIGHT: 167 LBS | BODY MASS INDEX: 26.84 KG/M2 | HEIGHT: 66 IN | DIASTOLIC BLOOD PRESSURE: 94 MMHG

## 2021-03-16 DIAGNOSIS — Z01.812 PRE-OPERATIVE LABORATORY EXAMINATION: ICD-10-CM

## 2021-03-16 DIAGNOSIS — S83.241A OTHER TEAR OF MEDIAL MENISCUS OF RIGHT KNEE, UNSPECIFIED WHETHER OLD OR CURRENT TEAR, INITIAL ENCOUNTER: Primary | ICD-10-CM

## 2021-03-16 DIAGNOSIS — S83.241D ACUTE MEDIAL MENISCUS TEAR OF RIGHT KNEE, SUBSEQUENT ENCOUNTER: ICD-10-CM

## 2021-03-16 PROCEDURE — 99213 OFFICE O/P EST LOW 20 MIN: CPT | Performed by: ORTHOPAEDIC SURGERY

## 2021-03-16 PROCEDURE — 4004F PT TOBACCO SCREEN RCVD TLK: CPT | Performed by: ORTHOPAEDIC SURGERY

## 2021-03-16 PROCEDURE — 3008F BODY MASS INDEX DOCD: CPT | Performed by: ORTHOPAEDIC SURGERY

## 2021-03-16 RX ORDER — CHLORHEXIDINE GLUCONATE 0.12 MG/ML
15 RINSE ORAL ONCE
Status: CANCELLED | OUTPATIENT
Start: 2021-04-06 | End: 2021-03-16

## 2021-03-16 RX ORDER — CEFAZOLIN SODIUM 2 G/50ML
2000 SOLUTION INTRAVENOUS ONCE
Status: CANCELLED | OUTPATIENT
Start: 2021-04-06 | End: 2021-03-16

## 2021-03-16 RX ORDER — CHLORHEXIDINE GLUCONATE 4 G/100ML
SOLUTION TOPICAL DAILY PRN
Status: CANCELLED | OUTPATIENT
Start: 2021-04-06

## 2021-03-16 NOTE — PROGRESS NOTES
Assessment/Plan:  Assessment/Plan   Diagnoses and all orders for this visit:    Acute medial meniscus tear of right knee, subsequent encounter       the patient has opted for elective arthroscopy of her right knee  All risks, complications, benefits were discussed with the patient in great detail including bleeding, infection, blood clots, pain, stiffness, neurovascular damage, fractures, dislocations, the possibility of loss of life or limb for surgery, heart attack, stroke, etcetera     her surgery  Is tentativelyscheduled for Wednesday April 28, 2021    Subjective:   Patient ID: Rohit Viramontes is a 50 y o  female  HPI    Patient presents for follow-up evaluation of ongoing right knee pain  She states that she has had little to no interval improvement since her previous visit  She continues to complain of pain in the anterior aspect of the knee that is exacerbated with going up and down stairs  She denies any recent bruising, swelling, numbness, tingling, or feelings of instability      The following portions of the patient's history were reviewed and updated as appropriate: allergies, current medications, past family history, past medical history, past social history, past surgical history and problem list     Past Medical History:   Diagnosis Date    Anxiety 5/31/2013    Arthritis     Herpes simplex     EXTERNAL    IBS (irritable bowel syndrome)     Insomnia     Malignant melanoma in situ ([facility] [address] )     Reactive airway disease      Past Surgical History:   Procedure Laterality Date    AUGMENTATION BREAST      SKIN BIOPSY  2013    2 PUNCH BIOPSIES    SKIN LESION EXCISION Right 2013    LEG     Family History   Problem Relation Age of Onset    Heart disease Father     Diabetes Other         MELLITUS    Hyperlipidemia Other      Social History     Socioeconomic History    Marital status: /Civil Union     Spouse name: None    Number of children: None    Years of education: None    Highest education level: None   Occupational History    Occupation: EMPLOYED   Social Needs    Financial resource strain: None    Food insecurity     Worry: None     Inability: None    Transportation needs     Medical: None     Non-medical: None   Tobacco Use    Smoking status: Current Every Day Smoker     Packs/day: 1 00     Types: Cigarettes    Smokeless tobacco: Never Used   Substance and Sexual Activity    Alcohol use: Yes     Frequency: 2-3 times a week     Drinks per session: 1 or 2    Drug use: No    Sexual activity: Never   Lifestyle    Physical activity     Days per week: None     Minutes per session: None    Stress: None   Relationships    Social connections     Talks on phone: None     Gets together: None     Attends Catholic service: None     Active member of club or organization: None     Attends meetings of clubs or organizations: None     Relationship status: None    Intimate partner violence     Fear of current or ex partner: None     Emotionally abused: None     Physically abused: None     Forced sexual activity: None   Other Topics Concern    None   Social History Narrative    EMPLOYED       Current Outpatient Medications:     albuterol (PROVENTIL HFA,VENTOLIN HFA) 90 mcg/act inhaler, INHALE 1 PUFF BY MOUTH EVERY 6 HOURS AS NEEDED FOR WHEEZE, Disp: 6 7 Inhaler, Rfl: 5    bimatoprost (LATISSE) 0 03 % ophthalmic solution, Administer 2 drops to both eyes daily at bedtime Place one drop on applicator and apply evenly along the skin of the upper eyelid at base of eyelashes once daily at bedtime; repeat procedure for second eye (use a clean applicator)  , Disp: 5 mL, Rfl: 6    escitalopram (Lexapro) 20 mg tablet, Take 1 tablet (20 mg total) by mouth daily, Disp: 90 tablet, Rfl: 3    lisdexamfetamine (VYVANSE) 30 MG capsule, Take 1 capsule (30 mg total) by mouth every eveningMax Daily Amount: 30 mg, Disp: 30 capsule, Rfl: 0    lisdexamfetamine (VYVANSE) 50 MG capsule, Take 1 capsule (50 mg total) by mouth every morningMax Daily Amount: 50 mg, Disp: 30 capsule, Rfl: 0    ondansetron (ZOFRAN) 4 mg tablet, Take 1 tablet (4 mg total) by mouth every 8 (eight) hours as needed for nausea or vomiting, Disp: 20 tablet, Rfl: 0    PREVIDENT 5000 SENSITIVE 1 1-5 % PSTE, BRUSH 1 TIME A DAY BEFORE BED, Disp: , Rfl: 5    Promethazine-DM (PHENERGAN-DM) 6 25-15 mg/5 mL oral syrup, Take 5 mL by mouth 4 (four) times a day as needed for cough, Disp: 240 mL, Rfl: 2    valACYclovir (VALTREX) 1,000 mg tablet, TAKE TWO TABS BY MOUTH AT ONSET, THEN TWO TABS 12 HRS  LATER, Disp: , Rfl: 3    zolpidem (AMBIEN CR) 6 25 MG CR tablet, Take 1 tablet (6 25 mg total) by mouth daily at bedtime as needed for sleep, Disp: 30 tablet, Rfl: 5    Allergies   Allergen Reactions    Vilazodone      Other reaction(s): abdomen pain    Bacitracin Rash    Neomycin Rash    Polymyxin B Rash       Review of Systems   Constitutional: Negative for chills, fever and unexpected weight change  HENT: Negative for hearing loss, nosebleeds and sore throat  Eyes: Negative for pain, redness and visual disturbance  Respiratory: Negative for cough, shortness of breath and wheezing  Cardiovascular: Negative for chest pain, palpitations and leg swelling  Gastrointestinal: Negative for abdominal pain, nausea and vomiting  Endocrine: Negative for polydipsia and polyuria  Genitourinary: Negative for dysuria and hematuria  Musculoskeletal:        As noted in HPI   Skin: Negative for rash and wound  Neurological: Negative for dizziness, numbness and headaches  Psychiatric/Behavioral: Negative for decreased concentration and suicidal ideas  The patient is not nervous/anxious          Objective:  /94 (BP Location: Left arm, Patient Position: Sitting, Cuff Size: Standard)   Pulse 71   Ht 5' 6 25" (1 683 m)   Wt 75 8 kg (167 lb)   BMI 26 75 kg/m²     Ortho Exam   Right knee -     No obvious anatomical deformity   patient ambulates with mildly antalgic gait pattern favoring the left lower extremity   no tenderness to palpation on exam   no soft tissue swelling or effusion noted   ROM 0°- 125°   knee is stable to varus, valgus, anterior, and posterior stress   there is a negative Lachman's, drawer, pivot shift test     Medial joint line tenderness is present   positive medial Raza's maneuver   there is a painful arc of motion throughout her right knee   calf compartments are soft   2+ TP and DP pulses with brisk capillary refill to the toes   Sural, saphenous, tibial, superficial and deep peroneal motor and sensory distributions intact  Sensation light touch intact distally    Physical Exam  Constitutional:       Appearance: She is well-developed  HENT:      Right Ear: External ear normal       Left Ear: External ear normal       Nose: Nose normal    Eyes:      Conjunctiva/sclera: Conjunctivae normal       Pupils: Pupils are equal, round, and reactive to light  Neck:      Musculoskeletal: Normal range of motion  Pulmonary:      Effort: Pulmonary effort is normal    Musculoskeletal: Normal range of motion  Skin:     General: Skin is warm and dry  Neurological:      Mental Status: She is alert and oriented to person, place, and time  Psychiatric:         Behavior: Behavior normal          Thought Content:  Thought content normal          Judgment: Judgment normal        Imaging:   Attending Physician has personally reviewed pertinent imaging in PACS, impression is as follows:    Review of MRI series taken 3/8/2021 of the  right  knee is significant for  Longitudinal peripheral posterior horn medial meniscus tear without flipped fragment     Scribe Attestation    I,:  Donald Steiner am acting as a scribe while in the presence of the attending physician :       I,:  Apurva Garcia DO personally performed the services described in this documentation    as scribed in my presence :

## 2021-03-16 NOTE — H&P (VIEW-ONLY)
Assessment/Plan:  Assessment/Plan   Diagnoses and all orders for this visit:    Acute medial meniscus tear of right knee, subsequent encounter       the patient has opted for elective arthroscopy of her right knee  All risks, complications, benefits were discussed with the patient in great detail including bleeding, infection, blood clots, pain, stiffness, neurovascular damage, fractures, dislocations, the possibility of loss of life or limb for surgery, heart attack, stroke, etcetera     her surgery  Is tentativelyscheduled for Wednesday April 28, 2021    Subjective:   Patient ID: Jean Claude Morrison is a 50 y o  female  HPI    Patient presents for follow-up evaluation of ongoing right knee pain  She states that she has had little to no interval improvement since her previous visit  She continues to complain of pain in the anterior aspect of the knee that is exacerbated with going up and down stairs  She denies any recent bruising, swelling, numbness, tingling, or feelings of instability      The following portions of the patient's history were reviewed and updated as appropriate: allergies, current medications, past family history, past medical history, past social history, past surgical history and problem list     Past Medical History:   Diagnosis Date    Anxiety 5/31/2013    Arthritis     Herpes simplex     EXTERNAL    IBS (irritable bowel syndrome)     Insomnia     Malignant melanoma in situ ([facility] [address]     Reactive airway disease      Past Surgical History:   Procedure Laterality Date    AUGMENTATION BREAST      SKIN BIOPSY  2013    2 PUNCH BIOPSIES    SKIN LESION EXCISION Right 2013    LEG     Family History   Problem Relation Age of Onset    Heart disease Father     Diabetes Other         MELLITUS    Hyperlipidemia Other      Social History     Socioeconomic History    Marital status: /Civil Union     Spouse name: None    Number of children: None    Years of education: None    Highest education level: None   Occupational History    Occupation: EMPLOYED   Social Needs    Financial resource strain: None    Food insecurity     Worry: None     Inability: None    Transportation needs     Medical: None     Non-medical: None   Tobacco Use    Smoking status: Current Every Day Smoker     Packs/day: 1 00     Types: Cigarettes    Smokeless tobacco: Never Used   Substance and Sexual Activity    Alcohol use: Yes     Frequency: 2-3 times a week     Drinks per session: 1 or 2    Drug use: No    Sexual activity: Never   Lifestyle    Physical activity     Days per week: None     Minutes per session: None    Stress: None   Relationships    Social connections     Talks on phone: None     Gets together: None     Attends Taoism service: None     Active member of club or organization: None     Attends meetings of clubs or organizations: None     Relationship status: None    Intimate partner violence     Fear of current or ex partner: None     Emotionally abused: None     Physically abused: None     Forced sexual activity: None   Other Topics Concern    None   Social History Narrative    EMPLOYED       Current Outpatient Medications:     albuterol (PROVENTIL HFA,VENTOLIN HFA) 90 mcg/act inhaler, INHALE 1 PUFF BY MOUTH EVERY 6 HOURS AS NEEDED FOR WHEEZE, Disp: 6 7 Inhaler, Rfl: 5    bimatoprost (LATISSE) 0 03 % ophthalmic solution, Administer 2 drops to both eyes daily at bedtime Place one drop on applicator and apply evenly along the skin of the upper eyelid at base of eyelashes once daily at bedtime; repeat procedure for second eye (use a clean applicator)  , Disp: 5 mL, Rfl: 6    escitalopram (Lexapro) 20 mg tablet, Take 1 tablet (20 mg total) by mouth daily, Disp: 90 tablet, Rfl: 3    lisdexamfetamine (VYVANSE) 30 MG capsule, Take 1 capsule (30 mg total) by mouth every eveningMax Daily Amount: 30 mg, Disp: 30 capsule, Rfl: 0    lisdexamfetamine (VYVANSE) 50 MG capsule, Take 1 capsule (50 mg total) by mouth every morningMax Daily Amount: 50 mg, Disp: 30 capsule, Rfl: 0    ondansetron (ZOFRAN) 4 mg tablet, Take 1 tablet (4 mg total) by mouth every 8 (eight) hours as needed for nausea or vomiting, Disp: 20 tablet, Rfl: 0    PREVIDENT 5000 SENSITIVE 1 1-5 % PSTE, BRUSH 1 TIME A DAY BEFORE BED, Disp: , Rfl: 5    Promethazine-DM (PHENERGAN-DM) 6 25-15 mg/5 mL oral syrup, Take 5 mL by mouth 4 (four) times a day as needed for cough, Disp: 240 mL, Rfl: 2    valACYclovir (VALTREX) 1,000 mg tablet, TAKE TWO TABS BY MOUTH AT ONSET, THEN TWO TABS 12 HRS  LATER, Disp: , Rfl: 3    zolpidem (AMBIEN CR) 6 25 MG CR tablet, Take 1 tablet (6 25 mg total) by mouth daily at bedtime as needed for sleep, Disp: 30 tablet, Rfl: 5    Allergies   Allergen Reactions    Vilazodone      Other reaction(s): abdomen pain    Bacitracin Rash    Neomycin Rash    Polymyxin B Rash       Review of Systems   Constitutional: Negative for chills, fever and unexpected weight change  HENT: Negative for hearing loss, nosebleeds and sore throat  Eyes: Negative for pain, redness and visual disturbance  Respiratory: Negative for cough, shortness of breath and wheezing  Cardiovascular: Negative for chest pain, palpitations and leg swelling  Gastrointestinal: Negative for abdominal pain, nausea and vomiting  Endocrine: Negative for polydipsia and polyuria  Genitourinary: Negative for dysuria and hematuria  Musculoskeletal:        As noted in HPI   Skin: Negative for rash and wound  Neurological: Negative for dizziness, numbness and headaches  Psychiatric/Behavioral: Negative for decreased concentration and suicidal ideas  The patient is not nervous/anxious          Objective:  /94 (BP Location: Left arm, Patient Position: Sitting, Cuff Size: Standard)   Pulse 71   Ht 5' 6 25" (1 683 m)   Wt 75 8 kg (167 lb)   BMI 26 75 kg/m²     Ortho Exam   Right knee -     No obvious anatomical deformity   patient ambulates with mildly antalgic gait pattern favoring the left lower extremity   no tenderness to palpation on exam   no soft tissue swelling or effusion noted   ROM 0°- 125°   knee is stable to varus, valgus, anterior, and posterior stress   there is a negative Lachman's, drawer, pivot shift test     Medial joint line tenderness is present   positive medial Raza's maneuver   there is a painful arc of motion throughout her right knee   calf compartments are soft   2+ TP and DP pulses with brisk capillary refill to the toes   Sural, saphenous, tibial, superficial and deep peroneal motor and sensory distributions intact  Sensation light touch intact distally    Physical Exam  Constitutional:       Appearance: She is well-developed  HENT:      Right Ear: External ear normal       Left Ear: External ear normal       Nose: Nose normal    Eyes:      Conjunctiva/sclera: Conjunctivae normal       Pupils: Pupils are equal, round, and reactive to light  Neck:      Musculoskeletal: Normal range of motion  Pulmonary:      Effort: Pulmonary effort is normal    Musculoskeletal: Normal range of motion  Skin:     General: Skin is warm and dry  Neurological:      Mental Status: She is alert and oriented to person, place, and time  Psychiatric:         Behavior: Behavior normal          Thought Content:  Thought content normal          Judgment: Judgment normal        Imaging:   Attending Physician has personally reviewed pertinent imaging in PACS, impression is as follows:    Review of MRI series taken 3/8/2021 of the  right  knee is significant for  Longitudinal peripheral posterior horn medial meniscus tear without flipped fragment     Scribe Attestation    I,:  Iftikhar Fuller am acting as a scribe while in the presence of the attending physician :       I,:  Edilma Vidales DO personally performed the services described in this documentation    as scribed in my presence :

## 2021-03-17 ENCOUNTER — TELEPHONE (OUTPATIENT)
Dept: OBGYN CLINIC | Facility: CLINIC | Age: 56
End: 2021-03-17

## 2021-03-17 ENCOUNTER — TELEPHONE (OUTPATIENT)
Dept: OBGYN CLINIC | Facility: OTHER | Age: 56
End: 2021-03-17

## 2021-03-17 NOTE — TELEPHONE ENCOUNTER
Patient called in wondering if there was any chance that surgery can be moved up?     Three weeks after her scheduled surgery she is going to [facility]     C/b # [phone] , can leave a message

## 2021-03-18 DIAGNOSIS — L65.9 HAIR LOSS: ICD-10-CM

## 2021-03-18 RX ORDER — BIMATOPROST 3 UG/ML
2 SOLUTION TOPICAL
Qty: 5 ML | Refills: 6 | Status: SHIPPED | OUTPATIENT
Start: 2021-03-18 | End: 2021-03-18

## 2021-03-18 RX ORDER — BIMATOPROST 3 UG/ML
2 SOLUTION TOPICAL
Qty: 5 ML | Refills: 6 | Status: SHIPPED | OUTPATIENT
Start: 2021-03-18 | End: 2022-04-26

## 2021-03-18 NOTE — TELEPHONE ENCOUNTER
Requested medication(s) are due for refill today: Yes  Patient has already received a courtesy refill: Yes  Other reason request has been forwarded to provider: Duplicate

## 2021-03-24 ENCOUNTER — TELEPHONE (OUTPATIENT)
Dept: OBGYN CLINIC | Facility: CLINIC | Age: 56
End: 2021-03-24

## 2021-03-24 NOTE — TELEPHONE ENCOUNTER
Patient is calling in requesting to speak to Zonia in regards to her upcoming sx  She states she wants to know If she can move up date again         Please advise,       Cb#: [phone]

## 2021-04-02 ENCOUNTER — TELEPHONE (OUTPATIENT)
Dept: OBGYN CLINIC | Facility: CLINIC | Age: 56
End: 2021-04-02

## 2021-04-02 ENCOUNTER — TELEPHONE (OUTPATIENT)
Dept: OBGYN CLINIC | Facility: HOSPITAL | Age: 56
End: 2021-04-02

## 2021-04-02 NOTE — TELEPHONE ENCOUNTER
Patient sees Dr Peter Kessler  Patient is calling in stating that she was just called to get surgery on 4/6  The patient as of now is scheduled for a post operative appointment in May and is asking for an updated appointment  She is asking for a call back relating this         Call back# [phone]

## 2021-04-03 NOTE — PRE-PROCEDURE INSTRUCTIONS
Pre-Surgery Instructions:   Medication Instructions    albuterol (PROVENTIL HFA,VENTOLIN HFA) 90 mcg/act inhaler Pt will take DOS if needed    bimatoprost (LATISSE) 0 03 % ophthalmic solution Pt not taking DOS    escitalopram (Lexapro) 20 mg tablet Pt not taking DOS    lisdexamfetamine (VYVANSE) 50 MG capsule Pt not taking DOS    PREVIDENT 5000 SENSITIVE 1 1-5 % PSTE Pt not taking DOS    valACYclovir (VALTREX) 1,000 mg tablet Pt not taking DOS    zolpidem (AMBIEN CR) 6 25 MG CR tablet Pt takes at HS    Acyclovir Med Class  Continue to take this medication on your normal schedule  If this is an oral medication and you take it in the morning, then you may take this medicine with a sip of water  Antidepressant Med Class  Continue to take this medication on your normal schedule  If this is an oral medication and you take it in the morning, then you may take this medicine with a sip of water  Inhalational Med Class  Continue to take these inhaler medications on your normal schedule up to and including the day of surgery  Methylphenidate Med Class  Continue this medication up to the evening before surgery/procedure, but do not take the morning of the day of surgery  Zolpidem Med Class  Continue this medication up to the evening before surgery/procedure, but do not take the morning of the day of surgery  Reviewed with Pt medication insts, showering insts, Pt going to  for [facility] soap for DOS  Advised of NPO at [facility] for DOS including candy mints etc   Advised of [address] call with FINAL DOS details will call 4/5 from [phone]  Pt verbalized understanding to all of the above

## 2021-04-05 ENCOUNTER — ANESTHESIA EVENT (OUTPATIENT)
Dept: PERIOP | Facility: HOSPITAL | Age: 56
End: 2021-04-05
Payer: COMMERCIAL

## 2021-04-06 ENCOUNTER — ANESTHESIA (OUTPATIENT)
Dept: PERIOP | Facility: HOSPITAL | Age: 56
End: 2021-04-06
Payer: COMMERCIAL

## 2021-04-06 ENCOUNTER — HOSPITAL ENCOUNTER (OUTPATIENT)
Facility: HOSPITAL | Age: 56
Setting detail: OUTPATIENT SURGERY
Discharge: HOME | End: 2021-04-06
Attending: ORTHOPAEDIC SURGERY | Admitting: ORTHOPAEDIC SURGERY
Payer: COMMERCIAL

## 2021-04-06 VITALS
RESPIRATION RATE: 18 BRPM | TEMPERATURE: 98 F | OXYGEN SATURATION: 99 % | BODY MASS INDEX: 26.21 KG/M2 | SYSTOLIC BLOOD PRESSURE: 144 MMHG | HEART RATE: 76 BPM | WEIGHT: 167 LBS | DIASTOLIC BLOOD PRESSURE: 86 MMHG | HEIGHT: 67 IN

## 2021-04-06 DIAGNOSIS — S83.241D TEAR OF MEDIAL MENISCUS OF RIGHT KNEE, UNSPECIFIED TEAR TYPE, UNSPECIFIED WHETHER OLD OR CURRENT TEAR, SUBSEQUENT ENCOUNTER: Primary | ICD-10-CM

## 2021-04-06 DIAGNOSIS — S83.241A OTHER TEAR OF MEDIAL MENISCUS OF RIGHT KNEE, UNSPECIFIED WHETHER OLD OR CURRENT TEAR, INITIAL ENCOUNTER: ICD-10-CM

## 2021-04-06 LAB
EXT PREGNANCY TEST URINE: NEGATIVE
EXT. CONTROL: NORMAL

## 2021-04-06 PROCEDURE — 88304 TISSUE EXAM BY PATHOLOGIST: CPT | Performed by: PATHOLOGY

## 2021-04-06 PROCEDURE — 29880 ARTHRS KNE SRG MNISECTMY M&L: CPT | Performed by: PHYSICIAN ASSISTANT

## 2021-04-06 PROCEDURE — 88311 DECALCIFY TISSUE: CPT | Performed by: PATHOLOGY

## 2021-04-06 PROCEDURE — 29880 ARTHRS KNE SRG MNISECTMY M&L: CPT | Performed by: ORTHOPAEDIC SURGERY

## 2021-04-06 PROCEDURE — 81025 URINE PREGNANCY TEST: CPT | Performed by: ORTHOPAEDIC SURGERY

## 2021-04-06 RX ORDER — SODIUM CHLORIDE, SODIUM LACTATE, POTASSIUM CHLORIDE, CALCIUM CHLORIDE 600; 310; 30; 20 MG/100ML; MG/100ML; MG/100ML; MG/100ML
125 INJECTION, SOLUTION INTRAVENOUS CONTINUOUS
Status: DISCONTINUED | OUTPATIENT
Start: 2021-04-06 | End: 2021-04-06 | Stop reason: HOSPADM

## 2021-04-06 RX ORDER — SODIUM CHLORIDE, SODIUM LACTATE, POTASSIUM CHLORIDE, CALCIUM CHLORIDE 600; 310; 30; 20 MG/100ML; MG/100ML; MG/100ML; MG/100ML
100 INJECTION, SOLUTION INTRAVENOUS CONTINUOUS
Status: DISCONTINUED | OUTPATIENT
Start: 2021-04-06 | End: 2021-04-06 | Stop reason: HOSPADM

## 2021-04-06 RX ORDER — CHLORHEXIDINE GLUCONATE 4 G/100ML
SOLUTION TOPICAL DAILY PRN
Status: DISCONTINUED | OUTPATIENT
Start: 2021-04-06 | End: 2021-04-06 | Stop reason: HOSPADM

## 2021-04-06 RX ORDER — HYDROCODONE BITARTRATE AND ACETAMINOPHEN 5; 325 MG/1; MG/1
1 TABLET ORAL EVERY 6 HOURS PRN
Qty: 30 TABLET | Refills: 0 | Status: SHIPPED | OUTPATIENT
Start: 2021-04-06 | End: 2021-04-16

## 2021-04-06 RX ORDER — CEFAZOLIN SODIUM 1 G/3ML
INJECTION, POWDER, FOR SOLUTION INTRAMUSCULAR; INTRAVENOUS AS NEEDED
Status: DISCONTINUED | OUTPATIENT
Start: 2021-04-06 | End: 2021-04-06

## 2021-04-06 RX ORDER — CHLORHEXIDINE GLUCONATE 0.12 MG/ML
15 RINSE ORAL ONCE
Status: COMPLETED | OUTPATIENT
Start: 2021-04-06 | End: 2021-04-06

## 2021-04-06 RX ORDER — CEFAZOLIN SODIUM 2 G/50ML
2000 SOLUTION INTRAVENOUS ONCE
Status: DISCONTINUED | OUTPATIENT
Start: 2021-04-06 | End: 2021-04-06 | Stop reason: HOSPADM

## 2021-04-06 RX ORDER — ONDANSETRON 2 MG/ML
4 INJECTION INTRAMUSCULAR; INTRAVENOUS ONCE AS NEEDED
Status: DISCONTINUED | OUTPATIENT
Start: 2021-04-06 | End: 2021-04-06 | Stop reason: HOSPADM

## 2021-04-06 RX ORDER — ONDANSETRON 2 MG/ML
INJECTION INTRAMUSCULAR; INTRAVENOUS AS NEEDED
Status: DISCONTINUED | OUTPATIENT
Start: 2021-04-06 | End: 2021-04-06

## 2021-04-06 RX ORDER — ONDANSETRON 2 MG/ML
4 INJECTION INTRAMUSCULAR; INTRAVENOUS EVERY 6 HOURS PRN
Status: DISCONTINUED | OUTPATIENT
Start: 2021-04-06 | End: 2021-04-06 | Stop reason: HOSPADM

## 2021-04-06 RX ORDER — METHYLPREDNISOLONE ACETATE 40 MG/ML
INJECTION, SUSPENSION INTRA-ARTICULAR; INTRALESIONAL; INTRAMUSCULAR; SOFT TISSUE AS NEEDED
Status: DISCONTINUED | OUTPATIENT
Start: 2021-04-06 | End: 2021-04-06 | Stop reason: HOSPADM

## 2021-04-06 RX ORDER — HYDROMORPHONE HCL/PF 1 MG/ML
0.5 SYRINGE (ML) INJECTION
Status: DISCONTINUED | OUTPATIENT
Start: 2021-04-06 | End: 2021-04-06 | Stop reason: HOSPADM

## 2021-04-06 RX ORDER — BUPIVACAINE HYDROCHLORIDE AND EPINEPHRINE 5; 5 MG/ML; UG/ML
INJECTION, SOLUTION PERINEURAL AS NEEDED
Status: DISCONTINUED | OUTPATIENT
Start: 2021-04-06 | End: 2021-04-06 | Stop reason: HOSPADM

## 2021-04-06 RX ORDER — HYDROCODONE BITARTRATE AND ACETAMINOPHEN 5; 325 MG/1; MG/1
1 TABLET ORAL EVERY 6 HOURS PRN
Status: DISCONTINUED | OUTPATIENT
Start: 2021-04-06 | End: 2021-04-06 | Stop reason: HOSPADM

## 2021-04-06 RX ORDER — LIDOCAINE HYDROCHLORIDE 10 MG/ML
INJECTION, SOLUTION EPIDURAL; INFILTRATION; INTRACAUDAL; PERINEURAL AS NEEDED
Status: DISCONTINUED | OUTPATIENT
Start: 2021-04-06 | End: 2021-04-06

## 2021-04-06 RX ORDER — PROPOFOL 10 MG/ML
INJECTION, EMULSION INTRAVENOUS AS NEEDED
Status: DISCONTINUED | OUTPATIENT
Start: 2021-04-06 | End: 2021-04-06

## 2021-04-06 RX ORDER — POVIDONE-IODINE 10 MG/G
OINTMENT TOPICAL AS NEEDED
Status: DISCONTINUED | OUTPATIENT
Start: 2021-04-06 | End: 2021-04-06 | Stop reason: HOSPADM

## 2021-04-06 RX ORDER — PROMETHAZINE HYDROCHLORIDE 25 MG/ML
12.5 INJECTION, SOLUTION INTRAMUSCULAR; INTRAVENOUS
Status: DISCONTINUED | OUTPATIENT
Start: 2021-04-06 | End: 2021-04-06 | Stop reason: HOSPADM

## 2021-04-06 RX ORDER — FENTANYL CITRATE 50 UG/ML
INJECTION, SOLUTION INTRAMUSCULAR; INTRAVENOUS AS NEEDED
Status: DISCONTINUED | OUTPATIENT
Start: 2021-04-06 | End: 2021-04-06

## 2021-04-06 RX ORDER — MIDAZOLAM HYDROCHLORIDE 2 MG/2ML
INJECTION, SOLUTION INTRAMUSCULAR; INTRAVENOUS AS NEEDED
Status: DISCONTINUED | OUTPATIENT
Start: 2021-04-06 | End: 2021-04-06

## 2021-04-06 RX ORDER — MELOXICAM 15 MG/1
15 TABLET ORAL DAILY
Qty: 30 TABLET | Refills: 0 | Status: SHIPPED | OUTPATIENT
Start: 2021-04-06 | End: 2022-05-09

## 2021-04-06 RX ORDER — CEPHALEXIN 500 MG/1
500 CAPSULE ORAL EVERY 8 HOURS SCHEDULED
Qty: 9 CAPSULE | Refills: 0 | Status: SHIPPED | OUTPATIENT
Start: 2021-04-06 | End: 2021-04-09

## 2021-04-06 RX ADMIN — HYDROCODONE BITARTRATE AND ACETAMINOPHEN 1 TABLET: 5; 325 TABLET ORAL at 09:17

## 2021-04-06 RX ADMIN — HYDROMORPHONE HYDROCHLORIDE 0.5 MG: 1 INJECTION, SOLUTION INTRAMUSCULAR; INTRAVENOUS; SUBCUTANEOUS at 08:34

## 2021-04-06 RX ADMIN — PROPOFOL 200 MG: 10 INJECTION, EMULSION INTRAVENOUS at 07:33

## 2021-04-06 RX ADMIN — HYDROMORPHONE HYDROCHLORIDE 0.5 MG: 1 INJECTION, SOLUTION INTRAMUSCULAR; INTRAVENOUS; SUBCUTANEOUS at 08:40

## 2021-04-06 RX ADMIN — CEFAZOLIN 2000 MG: 1 INJECTION, POWDER, FOR SOLUTION INTRAMUSCULAR; INTRAVENOUS at 07:25

## 2021-04-06 RX ADMIN — ONDANSETRON 4 MG: 2 INJECTION INTRAMUSCULAR; INTRAVENOUS at 08:09

## 2021-04-06 RX ADMIN — HYDROMORPHONE HYDROCHLORIDE 0.5 MG: 1 INJECTION, SOLUTION INTRAMUSCULAR; INTRAVENOUS; SUBCUTANEOUS at 08:45

## 2021-04-06 RX ADMIN — FENTANYL CITRATE 25 MCG: 50 INJECTION INTRAMUSCULAR; INTRAVENOUS at 07:54

## 2021-04-06 RX ADMIN — MIDAZOLAM HYDROCHLORIDE 2 MG: 1 INJECTION, SOLUTION INTRAMUSCULAR; INTRAVENOUS at 07:29

## 2021-04-06 RX ADMIN — FENTANYL CITRATE 25 MCG: 50 INJECTION INTRAMUSCULAR; INTRAVENOUS at 08:04

## 2021-04-06 RX ADMIN — FENTANYL CITRATE 50 MCG: 50 INJECTION INTRAMUSCULAR; INTRAVENOUS at 07:33

## 2021-04-06 RX ADMIN — CHLORHEXIDINE GLUCONATE 0.12% ORAL RINSE 15 ML: 1.2 LIQUID ORAL at 06:54

## 2021-04-06 RX ADMIN — LIDOCAINE HYDROCHLORIDE 100 MG: 10 INJECTION, SOLUTION EPIDURAL; INFILTRATION; INTRACAUDAL; PERINEURAL at 07:33

## 2021-04-06 RX ADMIN — SODIUM CHLORIDE, SODIUM LACTATE, POTASSIUM CHLORIDE, AND CALCIUM CHLORIDE 125 ML/HR: .6; .31; .03; .02 INJECTION, SOLUTION INTRAVENOUS at 06:51

## 2021-04-06 NOTE — ANESTHESIA POSTPROCEDURE EVALUATION
Post-Op Assessment Note    No complications documented      BP (!) 175/89 (04/06/21 0821)    Temp 98 °F (36 7 °C) (04/06/21 0821)    Pulse 79 (04/06/21 0821)   Resp 16 (04/06/21 0821)    SpO2 98 % (04/06/21 0821)

## 2021-04-06 NOTE — OP NOTE
OPERATIVE REPORT  PATIENT NAME: Alejandro Mccall    :  1965  MRN: [id number]  Pt Location: [facility] OR ROOM 02    SURGERY DATE: 2021    Surgeon(s) and Role:     * He Ackerman DO - Primary    Assistant:  Tracie Perera PA-C    Preop Diagnosis:  Other tear of medial meniscus of right knee, unspecified whether old or current tear, initial encounter [S83 241A]    Post-Op Diagnosis Codes:     * Other tear of medial meniscus of right knee, unspecified whether old or current tear, initial encounter [S83 241A]    Tear of medial meniscus right knee  Tear of lateral meniscus  Degenerative joint disease   Synovitis    Procedure(s) (LRB):  KNEE ARTHROSCOPY (Right)    medial meniscectomy  Lateral meniscectomy  Chondroplasty  Synovectomy  Post arthroscopic injection of intra-articular joint space and peripheral portals    Specimen(s):  Shavings    Estimated Blood Loss:   Minimal    Drains:  None    Anesthesia Type:   LMA with local placed at conclusion of procedure    Operative Indications: Other tear of medial meniscus of right knee, unspecified whether old or current tear, initial encounter [S83 241A]      Operative Findings:  TT: 12    Complications:   None    Procedure and Technique:    The patient was properly identified and brought into the  operative suite  After  Successful induction of the general anesthetic, a tourniquet was placed on patient's right proximal thigh  The right lower extremity was then prepped and draped in the usual fashion  It was medically necessary that the physician's assistant be in the room to aid in positioning and placing  the appropriate amount of retraction on  the patient  A qualified resident was not available    Tracie Perera PA-C-assisting necessary for the procedure for assistance with minimally invasive arthroscopic techniques for medial and lateral meniscectomies as well as assistance with utilization of the camera and shaver and the biter       The patient was also given a dose of intravenous antibiotics      An Esmarch was used to exsanguinate the right lower extremity  The tourniquet was then inflated  Using a #11  Scalpel blade an anterior lateral portal was made approximately 1 cm above the joint line 1 cm lateral to patellar tendon and an anterior medial portal was made approximately 1 cm above the joint line 1 cm medial to patellar tendon  The arthroscope was 1st introduced into the  into the lateral portal   First the  medial joint space was inspected  There was a tremendous amount of synovial tissue  A synovectomy did occur with the assistance of a shaver  There was a complex tear along the central and posterior 1/3 horn of the medial meniscus  There was grade 3 arthritic changes along the medial femoral condyle  Using an an arthroscopic biter, a medial meniscectomy  occurred  It was then smoothed out with shaver  A Chondroplasty did occur where there was rough articular cartilage  It was then confirmed with a probe that there was no further loosening of the meniscus and articular cartilage  The arthroscope was then introduced into the intercondylar notch  The ACL was probed and found be quite stable     The arthroscope was then introduced in the lateral side of her knee  After a synovectomy was performed, there was a degenerative rim tear along the lateral meniscus  Using arthroscopic shaver a lateral meniscectomy did occur  It was then confirmed with a probe that there is no further loosening of the meniscal tissue  There was minimal arthrosis on the lateral side of her knee where no chondroplasty was warranted  The arthroscope was then induced the patellofemoral joint  There was some grade 2 and grade 3 early arthritic changes along the undersurface of the patella  And trochlea     Using arthroscopic shaver a chondroplasty to occur smoothing the articular cartilage down to a nice base   The medial and lateral gutters were inspected next and there was no loose bodies  The knee was then copiously irrigated sterile arthroscopic solution  The portals were closed with 3 O nylon  The portals were then injected with 0 5% Marcaine with epinephrine, and the knee was injected with 0 5% Marcaine with epinephrine and Depo-Medrol  The wounds were then dressed with ointment Xeroform 4x4s sterile Webril and Ace bandage  The tourniquet was then deflated  There was no complications during the  procedure   She was successfully woken transferred to her hospital bed and went to the recovery room in stable condition      I was present for the entire procedure, A qualified resident physician was not available and A physician assistant was required during the procedure for retraction tissue handling,dissection and suturing    Patient Disposition:  PACU     SIGNATURE: Minus Click, DO  DATE: April 6, 2021  TIME: 7:32 AM

## 2021-04-06 NOTE — INTERVAL H&P NOTE
H&P reviewed  After examining the patient I find no changes in the patients condition since the H&P had been written    Lungs CTA  Heart RRR  Vitals:    04/06/21 0619   BP: 140/85   Pulse: 76   Resp: 18   Temp: (!) 96 8 °F (36 °C)   SpO2: 97%

## 2021-04-06 NOTE — ANESTHESIA PREPROCEDURE EVALUATION
Procedure:  KNEE ARTHROSCOPY (Right Knee)    Relevant Problems   CARDIO   (+) Other hyperlipidemia      NEURO/PSYCH   (+) Anxiety   (+) Major depression, single episode      PULMONARY   (+) Current smoker      PONV (postoperative nausea and vomiting)    Reactive airway disease    Malignant melanoma in situ (HCC)    Insomnia    IBS (irritable bowel syndrome)    Herpes simplex EXTERNAL   Arthritis    Anxiety    Jaundice    ADD (attention deficit disorder)    Pneumonia        Physical Exam    Airway    Mallampati score: I  TM Distance: >3 FB  Neck ROM: full     Dental       Cardiovascular  Cardiovascular exam normal    Pulmonary  Pulmonary exam normal     Other Findings        Anesthesia Plan  ASA Score- 2     Anesthesia Type- general with ASA Monitors  Additional Monitors:   Airway Plan: LMA  Plan Factors-Exercise tolerance (METS): >4 METS  Chart reviewed  EKG reviewed  Imaging results reviewed  Existing labs reviewed  Patient summary reviewed  Patient is a current smoker  Patient instructed to abstain from smoking on day of procedure  Induction- intravenous  Postoperative Plan- Plan for postoperative opioid use  Planned trial extubation    Informed Consent- Anesthetic plan and risks discussed with patient  I personally reviewed this patient with the CRNA  Discussed and agreed on the Anesthesia Plan with the CRNA  Cristy Hyatt

## 2021-04-08 ENCOUNTER — EVALUATION (OUTPATIENT)
Dept: PHYSICAL THERAPY | Facility: CLINIC | Age: 56
End: 2021-04-08
Payer: COMMERCIAL

## 2021-04-08 DIAGNOSIS — S83.241A OTHER TEAR OF MEDIAL MENISCUS OF RIGHT KNEE, UNSPECIFIED WHETHER OLD OR CURRENT TEAR, INITIAL ENCOUNTER: Primary | ICD-10-CM

## 2021-04-08 PROCEDURE — 97110 THERAPEUTIC EXERCISES: CPT | Performed by: PHYSICAL MEDICINE & REHABILITATION

## 2021-04-08 PROCEDURE — 97161 PT EVAL LOW COMPLEX 20 MIN: CPT | Performed by: PHYSICAL MEDICINE & REHABILITATION

## 2021-04-08 NOTE — PROGRESS NOTES
PT Evaluation     Today's date: 2021  Patient name: Karen Crocker  : 1965  MRN: [id number]  Referring provider: Mayra Santoro DO  Dx:   Encounter Diagnosis     ICD-10-CM    1  Other tear of medial meniscus of right knee, unspecified whether old or current tear, initial encounter  S83 241A Ambulatory referral to Physical Therapy     PT plan of care cert/re-cert   2  Pre-operative laboratory examination  [id number] Ambulatory referral to Physical Therapy     PT plan of care cert/re-cert       Start Time: 08  Stop Time: 900  Total time in clinic (min): 60 minutes    Assessment  Assessment details: Karen Crocker is a 50 y o  female presenting to [place] [address] [facility] clinic s/p R knee arthroscopy for a menisectomy  Since her surgery she is presenting with impairments such as decreased R knee strength, restricted active and passive R knee ROM, decreased R quad muscle firing, abnormal gait mechanics, weight bearing intolerance, reliance on pain medications and modalities for pain relief,  decreased endurance and activity intolerance  These have led to functional deficits including avoiding stairs at home, inability to do her laundry, inability to walk greater than 40ft, standing for more than 5 min with out having to sit for a break, and inability to sit for prolonged periods without pain or discomfort  Upon presentation to initial evaluation her incision sites were clean dry and intact with a small site on the R lateral femoral condyle that was slightly red  Pt was instructed to monitor its progression  R knee flexion and extension ROM was greatly limited with empty end feels which was due to patient reported pain  R knee hip flexion and extension strength was limited with fair R quad contractions to initiate R knee extension   Patient ambulating with significantly deviated gait using an antalgic pattern pattern with greatly increased stance time on LLE and increased knee flexion within R stance phase due to quad weakness  Pt would benefit from use of axillary crutches and was instructed to use the ones that she has at home when ambulating outside of the home to increase her safety and prevent her risk of falling  Post -op care was reviewed and all patient questions were answered  Pt would benefit from skilled PT 2x a week for 4 weeks to address the listed impairments, maximize functional mobility and return to her PLOF  Impairments: abnormal gait, abnormal muscle firing, abnormal or restricted ROM, abnormal movement, activity intolerance, impaired balance, impaired physical strength, lacks appropriate home exercise program, pain with function, weight-bearing intolerance and poor body mechanics  Understanding of Dx/Px/POC: good   Prognosis: good    Goals  STG - to be achieved in 3 weeks:  1  Pt will increase her R knee flexion ROM by at least 10 degrees in order to tolerate sitting for at least a half hour  2  Pt will increase her R knee extension strength by at least 2 grades to normalize her gait mechanics and decrease her reliance of an AD with walking  3  Pt will decrease max reported pain score to at least 6/10 in order to tolerate negotiate the stairs at her house  LTG- to be achieved in 6 weeks:   1  Pt will increase her R knee ROM to be [facility] in order to improve her functional mobility with transitional movements with sitting and standing  2  Pt will increase her R knee strength to be [facility] in order to walk at least a half mile with normal gait mechanics independently  3   Pt will decrease max reported pain score to at least 3/10 in order to return to work with little to no limitations       Plan  Patient would benefit from: skilled physical therapy  Referral necessary: No  Planned modality interventions: cryotherapy and thermotherapy: hydrocollator packs  Planned therapy interventions: balance, joint mobilization, neuromuscular re-education, patient education, balance/weight bearing training, body mechanics training, strengthening, stretching, therapeutic exercise, gait training, functional ROM exercises, flexibility, graded exercise and home exercise program  Frequency: 2x week  Duration in visits: 8  Duration in weeks: 4  Plan of Care beginning date: 2021  Plan of Care expiration date: 2021  Treatment plan discussed with: patient        Subjective Evaluation    History of Present Illness  Mechanism of injury: Pt is s/p R knee arthroscopy, medial lateral menisectomy, chondroplasty, and synovectomy 2021  Pt reports that her R knee had been hurting for the past couple of years and it was slowly  getting worse and worse and decided to see her MD BLACK did  Imaging (MRI); saw torn meniscus  Then scheduled surgery to remove part of torn meniscus  No complications from surgery  Since surgery she has experienced the most difficulty getting in and out of a chair, walking long and short distances, cannot do laundry as of now to due inability to balance and hold basket, and has not showered since surgery  Pain has been managed through her meds  (antiinflammatories and ms relaxer's) and has also been icing to help with pain  Pain  Current pain ratin  At best pain rating: 3  At worst pain rating: 10  Location: down around posterior med/lat border of the R patella and R tib fem joint space   Quality: needle-like, dull ache, throbbing, sharp, tight and discomfort  Relieving factors: ice, rest and medications  Aggravating factors: sitting, walking and standing (vaccumming )  Progression: worsening    Social Support  Steps to enter house: yes  Stairs in house: yes   Lives in: multiple-level home  Lives with: spouse    Employment status: not working (trying to get back to work, was previously an  for an [employer])  Hand dominance: right      Diagnostic Tests  MRI studies: abnormal    FCE comments:  Torn R medial meniscus Treatments  Previous treatment: medication  Current treatment: medication and physical therapy  Patient Goals  Patient goals for therapy: decreased pain, decreased edema, improved balance, increased motion, increased strength, independence with ADLs/IADLs and return to sport/leisure activities          Objective     Observations     Additional Observation Details  Incision sites clean dry and intact, small red spot (~ size of a nickel)noted on R lateral border of femur at lateral condyle, instructed patient to monitor     No noticeable edema     Will continue to monitor     Palpation     Right   Tenderness of the vastus medialis  Active Range of Motion     Right Knee   Flexion: 74 degrees with pain  Extension: -15 degrees with pain    Additional Active Range of Motion Details  Empty end feels with each direction due to pain   Will continue to assess    Strength/Myotome Testing     Left Knee   Flexion: 4+  Extension: 4+  Quadriceps contraction: good    Right Knee   Flexion: 3  Extension: 3  Quadriceps contraction: fair    Additional Strength Details  Hard time with quad muscle recruitment with quad sets, noticeable extension lag ~ 35-40 deg with SLR; difficulty completing with pain and noted tightness  Will continue to monitor     Ambulation     Observational Gait   Gait: antalgic, asymmetric and crouched   Increased left stance time and right swing time  Decreased walking speed, stride length, right stance time, left swing time, left step length and right step length  Base of support: increased  Stride length comments: on R     Additional Observational Gait Details  Slow antalgic pattern with asymmetric step lengths between R and L  Increase crouch with R stance due to decreased quad strength, and decreased time in R stance with greater time spent in L stance  Trunk lean anteriorly upon R stance                Precautions: standard       Re-eval Date: 5/8    Date 4/8       Visit Count 1       FOTO 4/8       Pain In See IE       Pain Out See IE Manuals 4/8            R knee flexion/extension PROM with overpressure at end range, patellar mobs                                                     Neuro Re-Ed             Weight shifting onto RLE forward, backward, lateral                                                                                            Ther Ex             NU step     QS    Heel slides    Gastroc stretch      LAQs       Mini squats       Seated HS stretch     HR/TR     reviewed  reviewed  reviewed    reviewed        reviewed                                                                                                        Ther Activity                                       Gait Training                                       Modalities             Cold pack                                 4/8 - HEP was issued and reviewed this date for above noted exercises  Pt demonstrated understanding without incident and without questions/concerns  Will continue to update upcoming       Patient was treated by Student Physical Therapist Tripp Uribe under the direct supervision of Tom Oneill PT , DPT

## 2021-04-12 ENCOUNTER — OFFICE VISIT (OUTPATIENT)
Dept: PHYSICAL THERAPY | Facility: CLINIC | Age: 56
End: 2021-04-12
Payer: COMMERCIAL

## 2021-04-12 DIAGNOSIS — S83.241A OTHER TEAR OF MEDIAL MENISCUS OF RIGHT KNEE, UNSPECIFIED WHETHER OLD OR CURRENT TEAR, INITIAL ENCOUNTER: Primary | ICD-10-CM

## 2021-04-12 PROCEDURE — 97110 THERAPEUTIC EXERCISES: CPT

## 2021-04-12 PROCEDURE — 97140 MANUAL THERAPY 1/> REGIONS: CPT

## 2021-04-12 NOTE — PROGRESS NOTES
Daily Note     Today's date: 2021  Patient name: Amilcar Hector  : 1965  MRN: [id number]  Referring provider: Renetta Pantoja DO  Dx:   Encounter Diagnosis     ICD-10-CM    1  Other tear of medial meniscus of right knee, unspecified whether old or current tear, initial encounter  S83 241A        Start Time: 0915  Stop Time: 1000  Total time in clinic (min): 45 minutes    Subjective: "I am feeling much better  I was able to drive but it's hard getting in/out of the car "      Objective: See treatment diary below      Assessment: Tolerated treatment fair  Antalgic gait noted with lack of TKE without AD  Encouraged use of at least 1 crutch to normalize gait  Decreased quad strength noted; difficulty initiating QS  Good flexion and extension noted with appropriate end range pain  Incisions appear closed and healing well  Will continue to progress as able to return to PLOF  Patient demonstrated fatigue post treatment and would benefit from continued PT      Plan: Continue per plan of care  Progress treatment as tolerated         Precautions: standard       Re-eval Date:     Date       Visit Count 1 2      FOTO        Pain In See IE 2-3      Pain Out See IE  1-2          Manuals       R knee flexion/extension PROM with overpressure at end range, patellar mobs   End range knee flexion and ext as fam, patella motion 10'                              Neuro Re-Ed        Weight shifting onto RLE forward, backward, lateral                                                         Ther Ex        NU step     QS    Heel slides    Gastroc stretch      LAQs       Mini squats       Seated HS stretch     HR/TR     reviewed  reviewed  reviewed    reviewed        reviewed  L2 10    heelslides  20x/3-5"    QS  2x10/3-5"      LAQ 2x10/3-5"    Mini squats  2x10          30x ea'                                                              Ther Activity                        Gait Training Modalities        Cold pack

## 2021-04-15 ENCOUNTER — OFFICE VISIT (OUTPATIENT)
Dept: PHYSICAL THERAPY | Facility: CLINIC | Age: 56
End: 2021-04-15
Payer: COMMERCIAL

## 2021-04-15 DIAGNOSIS — S83.241A OTHER TEAR OF MEDIAL MENISCUS OF RIGHT KNEE, UNSPECIFIED WHETHER OLD OR CURRENT TEAR, INITIAL ENCOUNTER: Primary | ICD-10-CM

## 2021-04-15 PROCEDURE — 97110 THERAPEUTIC EXERCISES: CPT

## 2021-04-15 PROCEDURE — 97140 MANUAL THERAPY 1/> REGIONS: CPT

## 2021-04-15 PROCEDURE — 97112 NEUROMUSCULAR REEDUCATION: CPT

## 2021-04-15 NOTE — PROGRESS NOTES
Daily Note     Today's date: 4/15/2021  Patient name: Lety Foy  : 1965  MRN: [id number]  Referring provider: Ash Chou DO  Dx:   Encounter Diagnosis     ICD-10-CM    1  Other tear of medial meniscus of right knee, unspecified whether old or current tear, initial encounter  S83 241A        Start Time: 915  Stop Time: 1010  Total time in clinic (min): 55 minutes    Subjective: "I am having more pain  I'm not sleeping good because of it  I did more, more walking through the yard, grocery shopping and carrying the bags  I stopped taking the NSAIDS 2* making me constipated  I don't know if I should "      Objective: See treatment diary below      Assessment: Tolerated treatment fair  Pt able to complete exercises without increase in symptoms  Appropriate swelling noted around knee joint; pt noted stopping NSAIDs; will continue monitor swelling  Strength gains noted; able to perform step ups with minimal UEs  Encouraged CP and elevate at home  Patient demonstrated fatigue post treatment and would benefit from continued PT      Plan: Continue per plan of care  Progress treatment as tolerated         Precautions: standard       Re-eval Date:     Date 4/8 4/12 4/15     Visit Count 1 2 3     FOTO        Pain In See IE 2-3 3/10     Pain Out See IE  1-2 3/10         Manuals       R knee flexion/extension PROM with overpressure at end range, patellar mobs   End range knee flexion and ext as fam, patella motion 10' End range knee flexion and ext as fam, patella motion 10'                             Neuro Re-Ed        Weight shifting onto RLE forward, backward, lateral                                                         Ther Ex        NU step     QS    Heel slides    Gastroc stretch      LAQs       Mini squats       Seated HS stretch     HR/TR     reviewed  reviewed  reviewed    reviewed        reviewed  L2 10    heelslides  20x/3-5"    QS  2x10/3-5"      LAQ 2x10/3-5"    Mini squats  2x10          30x ea' L2 10    heelslides  30x/3-5"    QS  3x10/3-5"    LAQ 2x10/3-5"    Mini squats  2x10            30x ea'     SLR   Flex/abd  2x10/3-5"     Step ups   Bottom step  20x                                              Ther Activity                        Gait Training                        Modalities        Cold pack

## 2021-04-19 ENCOUNTER — OFFICE VISIT (OUTPATIENT)
Dept: PHYSICAL THERAPY | Facility: CLINIC | Age: 56
End: 2021-04-19
Payer: COMMERCIAL

## 2021-04-19 DIAGNOSIS — S83.241A OTHER TEAR OF MEDIAL MENISCUS OF RIGHT KNEE, UNSPECIFIED WHETHER OLD OR CURRENT TEAR, INITIAL ENCOUNTER: Primary | ICD-10-CM

## 2021-04-19 PROCEDURE — 97110 THERAPEUTIC EXERCISES: CPT | Performed by: PHYSICAL MEDICINE & REHABILITATION

## 2021-04-19 PROCEDURE — 97140 MANUAL THERAPY 1/> REGIONS: CPT | Performed by: PHYSICAL MEDICINE & REHABILITATION

## 2021-04-19 NOTE — PROGRESS NOTES
Daily Note     Today's date: 2021  Patient name: Magali Russell  : 1965  MRN: [id number]  Referring provider: Kike Javed DO  Dx:   Encounter Diagnosis     ICD-10-CM    1  Other tear of medial meniscus of right knee, unspecified whether old or current tear, initial encounter  [id number]                   Subjective: "I feel pretty good  I think I had some unrealistic expectations coming out of this surgery I thought I would be back to [facility] by now "       Objective: See treatment diary below      Assessment: Tolerated treatment well  Frequent redirecting required to perform activities but shows overall compliance to program  Demonstrating full R knee ROM at this visit  Continued bruising and swelling at R knee tib fem joint and superior border of R patella  Increased R quad control noticed with completion of LAQ  Pt will continue to require skilled PT to address the listed impairments and maximize functional mobility to return to her PLOF  Plan: Continue per plan of care        Precautions: standard       Re-eval Date:     Date 4/8 4/12 4/15 4/19    Visit Count 1 2 3 4    FOTO        Pain In See IE 2-3 3/10 2 5/10    Pain Out See IE  1-2 3/10 2/10        Manuals 4/8 4/12 4/15 4/19    R knee flexion/extension PROM with overpressure at end range, patellar mobs   End range knee flexion and ext as fam, patella motion 10' End range knee flexion and ext as fam, patella motion 10' End range knee flexion and ext as fam, patella motion 10'      Flex: 123  Ext: -1                            Neuro Re-Ed        Weight shifting onto RLE forward, backward, lateral                                                         Ther Ex        NU step     QS    Heel slides    Gastroc stretch      LAQs       Mini squats       Seated HS stretch     HR/TR     reviewed  reviewed  reviewed    reviewed        reviewed  L2 10    heelslides  20x/3-5"    QS  2x10/3-5"      LAQ 2x10/3-5"    Mini squats  2x10          30x ea' L2 10    heelslides  30x/3-5"    QS  3x10/3-5"    LAQ 2x10/3-5"    Mini squats  2x10            30x ea' L2 10    heelslides  30x/3-5"    QS  3x10/3-5"    LAQ 2x10/3-5"    Mini squats  2x10            30x ea'- next session     SLR   Flex/abd  2x10/3-5" Flex/abd  2x10/3-5"- abd next session       Step ups   Bottom step  20x  Next session                                               Ther Activity                        Gait Training                        Modalities        Cold pack                     Patient was treated by Student Physical Therapist Minerva Lopez under the direct supervision of Agustina Benz PT , DPT

## 2021-04-20 ENCOUNTER — OFFICE VISIT (OUTPATIENT)
Dept: OBGYN CLINIC | Facility: CLINIC | Age: 56
End: 2021-04-20

## 2021-04-20 VITALS
HEART RATE: 101 BPM | WEIGHT: 167 LBS | SYSTOLIC BLOOD PRESSURE: 148 MMHG | HEIGHT: 67 IN | BODY MASS INDEX: 26.21 KG/M2 | DIASTOLIC BLOOD PRESSURE: 90 MMHG

## 2021-04-20 DIAGNOSIS — Z98.890 S/P RIGHT KNEE ARTHROSCOPY: Primary | ICD-10-CM

## 2021-04-20 PROCEDURE — 99024 POSTOP FOLLOW-UP VISIT: CPT | Performed by: ORTHOPAEDIC SURGERY

## 2021-04-20 PROCEDURE — 3008F BODY MASS INDEX DOCD: CPT | Performed by: ORTHOPAEDIC SURGERY

## 2021-04-20 NOTE — PROGRESS NOTES
ASSESSMENT/PLAN:    Diagnoses and all orders for this visit:    S/P right knee arthroscopy         the patient is continuing to recover since surgery  Her incisions are healing well  She should continue physical therapy for strengthening, stretching and range of motion weight-bearing as tolerated  She will follow up with our office in 1 month for strength and motion check  The patient is acceptable to this plan  Return in about 4 weeks (around 5/18/2021)  _____________________________________________________  CHIEF COMPLAINT:  Chief Complaint   Patient presents with    Right Knee - Post-op         SUBJECTIVE:  Magali Russell is a 50 y o  female   Status post right knee arthroscopy from 04/06/2021  The patient is continuing to recover since surgery  She states she has been participating in physical therapy  She is still complaining of knee discomfort  She denies any new falls or trauma  She denies any numbness or tingling  She denies any fever or chills      The following portions of the patient's history were reviewed and updated as appropriate: allergies, current medications, past family history, past medical history, past social history, past surgical history and problem list     PAST MEDICAL HISTORY:  Past Medical History:   Diagnosis Date    ADD (attention deficit disorder) 2019    Anxiety 5/31/2013    Arthritis     Herpes simplex     EXTERNAL    IBS (irritable bowel syndrome)     Insomnia     Jaundice     Malignant melanoma in situ ([address] )     Pneumonia     PONV (postoperative nausea and vomiting)     Reactive airway disease        PAST SURGICAL HISTORY:  Past Surgical History:   Procedure Laterality Date    AUGMENTATION BREAST      LA KNEE SCOPE,MED/LAT MENISECTOMY Right 4/6/2021    Procedure: KNEE ARTHROSCOPY, medial lateral menisectomy,chondroplasty,synovectomy,inj;  Surgeon: Tameka Oneill DO;  Location: [address] [place] [address] OR;  Service: Orthopedics    SKIN BIOPSY  2013    2 PUNCH BIOPSIES    SKIN LESION EXCISION Right 2013    LEG    TUBAL LIGATION         FAMILY HISTORY:  Family History   Problem Relation Age of Onset    Heart disease Father     Diabetes Other         MELLITUS    Hyperlipidemia Other        SOCIAL HISTORY:  Social History     Tobacco Use    Smoking status: Current Every Day Smoker     Packs/day: 1 00     Types: Cigarettes    Smokeless tobacco: Never Used   Substance Use Topics    Alcohol use: Yes     Frequency: 2-3 times a week     Drinks per session: 1 or 2    Drug use: No       MEDICATIONS:    Current Outpatient Medications:     albuterol (PROVENTIL HFA,VENTOLIN HFA) 90 mcg/act inhaler, INHALE 1 PUFF BY MOUTH EVERY 6 HOURS AS NEEDED FOR WHEEZE, Disp: 6 7 Inhaler, Rfl: 5    bimatoprost (LATISSE) 0 03 % ophthalmic solution, ADMINISTER 2 DROPS TO BOTH EYES DAILY AT BEDTIME PLACE ONE DROP ON APPLICATOR AND APPLY EVENLY ALONG THE SKIN OF THE UPPER EYELID AT BASE OF EYELASHES ONCE DAILY AT BEDTIME REPEAT PROCEDURE FOR SECOND EYE (USE A CLEAN APPLICATOR)  (Patient taking differently: Administer 2 drops to both eyes daily in the early morning Place one drop on applicator and apply evenly along the skin of the upper eyelid at base of eyelashes once daily at bedtime; repeat procedure for second eye (use a clean applicator)  ), Disp: 5 mL, Rfl: 6    escitalopram (Lexapro) 20 mg tablet, Take 1 tablet (20 mg total) by mouth daily, Disp: 90 tablet, Rfl: 3    lisdexamfetamine (VYVANSE) 50 MG capsule, Take 1 capsule (50 mg total) by mouth every morningMax Daily Amount: 50 mg, Disp: 30 capsule, Rfl: 0    meloxicam (MOBIC) 15 mg tablet, Take 1 tablet (15 mg total) by mouth daily, Disp: 30 tablet, Rfl: 0    PREVIDENT 5000 SENSITIVE 1 1-5 % PSTE, BRUSH 1 TIME A DAY BEFORE BED, Disp: , Rfl: 5    valACYclovir (VALTREX) 1,000 mg tablet, TAKE TWO TABS BY MOUTH AT ONSET, THEN TWO TABS 12 HRS   LATER, Disp: , Rfl: 3    zolpidem (AMBIEN CR) 6 25 MG CR tablet, Take 1 tablet (6 25 mg total) by mouth daily at bedtime as needed for sleep, Disp: 30 tablet, Rfl: 5    ALLERGIES:  Allergies   Allergen Reactions    Bacitracin Rash    Neomycin Rash    Polymyxin B Rash    Vilazodone Other (See Comments)     Other reaction(s): abdomen pain       ROS:  Review of Systems     Constitutional: Negative for fatigue, fever or loss of appetite  HENT: Negative  Respiratory: Negative for shortness of breath, dyspnea  Cardiovascular: Negative for chest pain/tightness  Gastrointestinal: Negative for abdominal pain, N/V  Endocrine: Negative for cold/heat intolerance, unexplained weight loss/gain  Genitourinary: Negative for flank pain, dysuria, hematuria  Musculoskeletal: Positive for arthralgia   Skin: Negative for rash  Neurological: Negative for numbness or tingling  Psychiatric/Behavioral: Negative for agitation  _____________________________________________________  PHYSICAL EXAMINATION:    Blood pressure 148/90, pulse 101, height 5' 6 5" (1 689 m), weight 75 8 kg (167 lb), not currently breastfeeding  Constitutional: Oriented to person, place, and time  Appears well-developed and well-nourished  No distress  HENT:   Head: Normocephalic  Eyes: Conjunctivae are normal  Right eye exhibits no discharge  Left eye exhibits no discharge  No scleral icterus  Cardiovascular: Normal rate  Pulmonary/Chest: Effort normal    Neurological: Alert and oriented to person, place, and time  Skin: Skin is warm and dry  No rash noted  Not diaphoretic  No erythema  No pallor  Psychiatric: Normal mood and affect   Behavior is normal  Judgment and thought content normal       MUSCULOSKELETAL EXAMINATION:   Physical Exam  Ortho Exam      Right lower extremity is neurovascularly intact  Toes are pink mobile   Compartments are soft   Range of motion of the knee is from 0-120 degrees   Incisions are healing well   Mild tenderness to palpation along the medial aspect   Brisk cap refill   Sensation intact   Negative Lachman, drawer or pivot shift   No warmth, erythema or ecchymosis present  Objective:  BP Readings from Last 1 Encounters:   04/20/21 148/90      Wt Readings from Last 1 Encounters:   04/20/21 75 8 kg (167 lb)        BMI:   Estimated body mass index is 26 55 kg/m² as calculated from the following:    Height as of this encounter: 5' 6 5" (1 689 m)  Weight as of this encounter: 75 8 kg (167 lb)          Cris Messina PA-C

## 2021-04-22 ENCOUNTER — OFFICE VISIT (OUTPATIENT)
Dept: PHYSICAL THERAPY | Facility: CLINIC | Age: 56
End: 2021-04-22
Payer: COMMERCIAL

## 2021-04-22 DIAGNOSIS — S83.241A OTHER TEAR OF MEDIAL MENISCUS OF RIGHT KNEE, UNSPECIFIED WHETHER OLD OR CURRENT TEAR, INITIAL ENCOUNTER: Primary | ICD-10-CM

## 2021-04-22 PROCEDURE — 97112 NEUROMUSCULAR REEDUCATION: CPT

## 2021-04-22 PROCEDURE — 97110 THERAPEUTIC EXERCISES: CPT

## 2021-04-22 NOTE — PROGRESS NOTES
Daily Note     Today's date: 2021  Patient name: Zeke Duong  : 1965  MRN: [id number]  Referring provider: Thuan Urias DO  Dx:   Encounter Diagnosis     ICD-10-CM    1  Other tear of medial meniscus of right knee, unspecified whether old or current tear, initial encounter  S83 [id number]        Start Time: 0915  Stop Time: 1000  Total time in clinic (min): 45 minutes    Subjective: "I'm doing better today  I am having less pain and it's feeling better "      Objective: See treatment diary below      Assessment: Tolerated treatment well  Pt able to achieve full revolutions on [address] [place] [address] without incident  Added hip ADD with good motor control  Tenderness noted over incision site with bruising  End range tightness noted  Would benefit form continued skilled therapy to increase motion,strength, motor control, proprioception, and decreased pain and edema  Patient demonstrated fatigue post treatment and would benefit from continued PT      Plan: Continue per plan of care  Progress treatment as tolerated         Precautions: standard       Re-eval Date:     Date 4/8 4/12 4/15 4/19 4/22   Visit Count 1 2 3 4 5   FOTO        Pain In See IE 2-3 3/10 2 5/10 1/10   Pain Out See IE  1-2 3/10 2/10 1/10       Manuals 4/8 4/12 4/15 4/19 4/22   R knee flexion/extension PROM with overpressure at end range, patellar mobs   End range knee flexion and ext as fam, patella motion 10' End range knee flexion and ext as fam, patella motion 10' End range knee flexion and ext as fam, patella motion 10'      Flex: 123  Ext: -1                            Neuro Re-Ed        Weight shifting onto RLE forward, backward, lateral      Rhomb foam  EO 1x30"  EC 2x30"                                                   Ther Ex        NU step rev    QS    Heel slides    Gastroc stretch      LAQs       Mini squats       Seated HS stretch     HR/TR     reviewed  reviewed  reviewed    reviewed        reviewed  L2 10    heelslides  20x/3-5"    QS  2x10/3-5"      LAQ 2x10/3-5"    Mini squats  2x10          30x ea' L2 10    heelslides  30x/3-5"    QS  3x10/3-5"    LAQ 2x10/3-5"    Mini squats  2x10            30x ea' L2 10    heelslides  30x/3-5"    QS  3x10/3-5"    LAQ 2x10/3-5"    Mini squats  2x10            30x ea'- next session  [address] [facility] pend-full rev L2 10      QS  3x10/3-5"      Bridges  2x10/3-5"    Mini squats  2x10    HR/TR   30x ea   SLR   Flex/abd  2x10/3-5" Flex/abd  2x10/3-5"- abd next session    3 way no ext  3x10/3-5"   Step ups   Bottom step  20x  Next session    30x                                           Ther Activity                        Gait Training                        Modalities        Cold pack                     Patient was treated by Student Physical Therapist Mirza Robbins under the direct supervision of Victoriano Pan PT , DPT

## 2021-04-26 ENCOUNTER — OFFICE VISIT (OUTPATIENT)
Dept: PHYSICAL THERAPY | Facility: CLINIC | Age: 56
End: 2021-04-26
Payer: COMMERCIAL

## 2021-04-26 DIAGNOSIS — S83.241A OTHER TEAR OF MEDIAL MENISCUS OF RIGHT KNEE, UNSPECIFIED WHETHER OLD OR CURRENT TEAR, INITIAL ENCOUNTER: Primary | ICD-10-CM

## 2021-04-26 PROCEDURE — 97110 THERAPEUTIC EXERCISES: CPT | Performed by: PHYSICAL MEDICINE & REHABILITATION

## 2021-04-26 PROCEDURE — 97140 MANUAL THERAPY 1/> REGIONS: CPT | Performed by: PHYSICAL MEDICINE & REHABILITATION

## 2021-04-26 NOTE — PROGRESS NOTES
Daily Note     Today's date: 2021  Patient name: Amilcar Hector  : 1965  MRN: [id number]  Referring provider: Renetta Pantoja DO  Dx:   Encounter Diagnosis     ICD-10-CM    1  Other tear of medial meniscus of right knee, unspecified whether old or current tear, initial encounter  V00 114R                   Subjective: "It feels very tight  I was hesitant on bending the knee the past couple of times but I wish that I had because it's been feeling very very tight "       Objective: See treatment diary below      Assessment: Tolerated treatment well  Continues to demonstrate increased and WFL soft tissue mobility with knee ROM being full, painless and WFL  Also demonstrating increased tolerance to strength training with addition of clamshells and lateral step ups with no adverse reactions  Still with increased swelling and edema in R knee joint line as well as superior/inferior border of patella  Edu to continue icing and elevation to help alleviate these residual symptoms of swelling  Not tender to touch, patient just reports joint space feeling full  Pt will continue to require skilled PT to address the listed impairments and improve strength to maximize functional mobility to return to her PLOF  Plan: Continue per plan of care        Precautions: standard       Re-eval Date:     Date        Visit Count 6       FOTO        Pain In 0/10       Pain Out 0/10           Manuals        R knee flexion/extension PROM with overpressure at end range, patellar mobs  End range knee flexion and ext as fam, patella motion 10'        Flex 135   Ext -1                       Neuro Re-Ed        Weight shifting onto RLE forward, backward, lateral  Rhomb foam  EO 1x30"  EC 2x30"- NV                                                        Ther Ex        NU step rev    QS    Heel slides    Gastroc stretch      LAQs       Mini squats       Seated HS stretch     HR/TR [address] [facility]    heelslides  20x/3-5"    QS- HEP    Clamshells 2x10 R     LAQ 2x10/3-5"     Bridges 2x10    Mini squats  2x10    HR/TR   30x ea       SLR   3 way no ext  3x10/3-5"       Step ups 30x    Lateral steps ups 2x10         Leg press next visit if tolerated                                       Ther Activity                        Gait Training                        Modalities        Cold pack  10' to R knee with no adverse reactions noticed after                    Patient was treated by Student Physical Therapist Leta Mckeon under the direct supervision of Tiffany Chris PT , DPT

## 2021-04-29 ENCOUNTER — OFFICE VISIT (OUTPATIENT)
Dept: PHYSICAL THERAPY | Facility: CLINIC | Age: 56
End: 2021-04-29
Payer: COMMERCIAL

## 2021-04-29 DIAGNOSIS — S83.241A OTHER TEAR OF MEDIAL MENISCUS OF RIGHT KNEE, UNSPECIFIED WHETHER OLD OR CURRENT TEAR, INITIAL ENCOUNTER: Primary | ICD-10-CM

## 2021-04-29 PROCEDURE — 97110 THERAPEUTIC EXERCISES: CPT | Performed by: PHYSICAL MEDICINE & REHABILITATION

## 2021-04-29 PROCEDURE — 97140 MANUAL THERAPY 1/> REGIONS: CPT | Performed by: PHYSICAL MEDICINE & REHABILITATION

## 2021-04-29 NOTE — PROGRESS NOTES
Daily Note     Today's date: 2021  Patient name: Lilly Johnson  : 1965  MRN: [id number]  Referring provider: Lian Arizmendi DO  Dx:   Encounter Diagnosis     ICD-10-CM    1  Other tear of medial meniscus of right knee, unspecified whether old or current tear, initial encounter  S88 416F                   Subjective: "Just a little stiff today  I think the rains playing into it a little but I will loosen up on the bike "       Objective: See treatment diary below      Assessment: Tolerated treatment well  Able to progress strengthening this visit with additional weight with SLR and resistance bands with bridging and clamshells  Continues to maintain and demonstrate full knee ROM and patellar mobility  Able to also tolerate addition of leg press with light weight and reduced knee angle  No longer continues to complain of pain in RLE just sore and discomfort  Will continue to require skilled PT to address the listed impairments and maximize functional mobility to return to her PLOF  Plan: Continue per plan of care        Precautions: standard       Re-eval Date:     Date       Visit Count 6 7      FOTO        Pain In 0/10 0/10      Pain Out 0/10 1/10          Manuals       R knee flexion/extension PROM with overpressure at end range, patellar mobs  End range knee flexion and ext as fam, patella motion 10' End range knee flexion and ext as fam, patella motion 10'       Flex 135   Ext -1                       Neuro Re-Ed        Weight shifting onto RLE forward, backward, lateral  Rhomb foam  EO 1x30"  EC 2x30"- NV  Rhomb foam  EO 1x30"  EC 2x30"- NV                                                      Ther Ex        NU step rev    QS    Heel slides    Gastroc stretch      LAQs       Mini squats       Seated HS stretch     HR/TR [facility]    heelslides  20x/3-5"    QS- HEP    Clamshells 2x10 R     LAQ 2x10/3-5"     Bridges 2x10    Mini squats  2x10    HR/TR   30x ea [facility] L3 10'    heelslides  20x/3-5"        Clamshells  2x10 R     LAQ 2x10/3-5"    Bridges 2x10              SLR   3 way no ext  3x10/3-5"   3 way no ext  3x10/3-5"        Step ups 30x    Lateral steps ups 2x10 NV      NV        Leg press next visit if tolerated Leg press 40# 2x10                                       Ther Activity                        Gait Training                        Modalities        Cold pack  10' to R knee with no adverse reactions noticed after  10' to R knee with no adverse reactions noticed after                  Patient was treated by Student Physical Therapist Renato Tabor under the direct supervision of Marietta Braxton PT , DPT

## 2021-05-03 ENCOUNTER — OFFICE VISIT (OUTPATIENT)
Dept: PHYSICAL THERAPY | Facility: CLINIC | Age: 56
End: 2021-05-03
Payer: COMMERCIAL

## 2021-05-03 DIAGNOSIS — S83.241A OTHER TEAR OF MEDIAL MENISCUS OF RIGHT KNEE, UNSPECIFIED WHETHER OLD OR CURRENT TEAR, INITIAL ENCOUNTER: Primary | ICD-10-CM

## 2021-05-03 PROCEDURE — 97110 THERAPEUTIC EXERCISES: CPT | Performed by: PHYSICAL MEDICINE & REHABILITATION

## 2021-05-03 PROCEDURE — 97140 MANUAL THERAPY 1/> REGIONS: CPT | Performed by: PHYSICAL MEDICINE & REHABILITATION

## 2021-05-03 NOTE — PROGRESS NOTES
Daily Note     Today's date: 5/3/2021  Patient name: Chinedu España  : 1965  MRN: [id number]  Referring provider: Gopal Freire DO  Dx:   Encounter Diagnosis     ICD-10-CM    1  Other tear of medial meniscus of right knee, unspecified whether old or current tear, initial encounter  A60 006G                   Subjective: "I've been okay  I still get little twinges of pain like when I'm going up the stairs and I trip a little and feel off balance "       Objective: See treatment diary below      Assessment: Tolerated treatment well  Tolerating strengthening exercises well and ready to progress to more dynamic strengthening exercises such as lateral band walking and monster walking  Still with residual swelling around R patella in which she is encouraged to continue self STM and LE elevation  Pt reports being compliant to heel slides at home and has maintained full ROM at this time  Pt will continue to require skilled PT to address the listed impairments and maximize functional mobility to return to her PLOF  Plan: Continue per plan of care        Precautions: standard       Re-eval Date:     Date 4/26 4/29 5/3     Visit Count 6 7 8     FOTO        Pain In 0/10 0/10 0/10     Pain Out 0/10 1/10 0/10         Manuals 4/26 4/29 5/3     R knee flexion/extension PROM with overpressure at end range, patellar mobs  End range knee flexion and ext as fam, patella motion 10' End range knee flexion and ext as fam, patella motion 10' End range knee flexion and ext as fam, patella motion 10'      Flex 135   Ext -1                       Neuro Re-Ed        Weight shifting onto RLE forward, backward, lateral  Rhomb foam  EO 1x30"  EC 2x30"- NV  Rhomb foam  EO 1x30"  EC 2x30"- NV Rhomb foam  EC 1x30"  EO 2x30"                                                     Ther Ex        NU step rev    QS    Heel slides    Gastroc stretch      LAQs       Mini squats       Seated HS stretch     HR/TR [address] [facility]    heelslides  20x/3-5"    QS- HEP    Clamshells 2x10 R     LAQ 2x10/3-5"     Bridges 2x10    Mini squats  2x10    HR/TR   30x ea [facility] L3 10'    heelslides  20x/3-5"        Clamshells  2x10 R     LAQ 2x10/3-5"    Bridges 2x10         [address] [facility] L3 10'    HEP           Clamshells green Tband R     LAQ 2x10/3-5" #2    Bridges 2x10 green Tband     90/90 2x10 green TBand     Mini squats 2x10      SLR   3 way no ext  3x10/3-5"   3 way no ext  3x10/3-5"     3 way no ext  3x10/3-5"     Step ups 30x    Lateral steps ups 2x10 NV      NV    step ups x20       Leg press next visit if tolerated Leg press 40# 2x10  Leg press 40# 2x10                                      Ther Activity                        Gait Training                        Modalities        Cold pack  10' to R knee with no adverse reactions noticed after  10' to R knee with no adverse reactions noticed after 10' to R knee with no adverse reactions noticed after                 Patient was treated by Student Physical Therapist Leta Mckeon under the direct supervision of Tiffany Chris PT , DPT

## 2021-05-06 ENCOUNTER — OFFICE VISIT (OUTPATIENT)
Dept: PHYSICAL THERAPY | Facility: CLINIC | Age: 56
End: 2021-05-06
Payer: COMMERCIAL

## 2021-05-06 DIAGNOSIS — S83.241A OTHER TEAR OF MEDIAL MENISCUS OF RIGHT KNEE, UNSPECIFIED WHETHER OLD OR CURRENT TEAR, INITIAL ENCOUNTER: Primary | ICD-10-CM

## 2021-05-06 PROCEDURE — 97110 THERAPEUTIC EXERCISES: CPT | Performed by: PHYSICAL MEDICINE & REHABILITATION

## 2021-05-06 NOTE — PROGRESS NOTES
Daily Note     Today's date: 2021  Patient name: Janiya Constantino  : 1965  MRN: [id number]  Referring provider: Adam Noel DO  Dx:   Encounter Diagnosis     ICD-10-CM    1  Other tear of medial meniscus of right knee, unspecified whether old or current tear, initial encounter  [id number]                   Subjective: " I feel great "       Objective: See treatment diary below      Assessment: Tolerated treatment well  Continues to show excellent strength gains as she progresses through therapy  Incorporated lateral banded walking and monster walking with no incident  Will continue to address residual but minimal strength deficits as patient continues through therapy  Encouraged to continue to complete heel slides at home to maintain adequate R knee ROM  Patient will continue to require skilled PT to address the listed impairments, maximize functional mobility to return to her PLOF  Plan: Continue per plan of care  Progress treatment as tolerated         Precautions: standard       Re-eval Date:     Date 4/26 4/29 5/3 5/6    Visit Count 6 7 8 9    FOTO        Pain In 0/10 0/10 0/10 0/10    Pain Out 0/10 1/10 0/10 0/10         Manuals 4/26 4/29 5/3 5/6    R knee flexion/extension PROM with overpressure at end range, patellar mobs  End range knee flexion and ext as fam, patella motion 10' End range knee flexion and ext as fam, patella motion 10' End range knee flexion and ext as fam, patella motion 10' End range knee flexion and ext as fam, patella motion 10'     Flex 135   Ext -1                       Neuro Re-Ed        Weight shifting onto RLE forward, backward, lateral  Rhomb foam  EO 1x30"  EC 2x30"- NV  Rhomb foam  EO 1x30"  EC 2x30"- NV Rhomb foam  EC 1x30"  EO 2x30" NV                                                      Ther Ex        NU step rev    QS    Heel slides    Gastroc stretch      LAQs       Mini squats       Seated HS stretch     HR/TR [facility]    heelslides  20x/3-5"    QS- HEP    Clamshells 2x10 R     LAQ 2x10/3-5"     Bridges 2x10    Mini squats  2x10    HR/TR   30x ea [facility] L3 10'    heelslides  20x/3-5"        Clamshells  2x10 R     LAQ 2x10/3-5"    Bridges 2x10         [facility] L3 10'    HEP           Clamshells green Tband R     LAQ 2x10/3-5" #2    Bridges 2x10 green Tband     90/90 2x10 green TBand     Mini squats 2x10  [facility] L3 10'              Clamshells green Tband R           Bridges 2x10 green Tband     90/90 2x10 green TBand     Mini squat   2x10       SLR   3 way no ext  3x10/3-5"   3 way no ext  3x10/3-5"     3 way no ext  3x10/3-5"     Step ups 30x    Lateral steps ups 2x10 NV      NV    step ups x20  step ups x20 8" step      Leg press next visit if tolerated Leg press 40# 2x10  Leg press 40# 2x10  Leg press 75# 2x10    SL press 60# 2x10         Leg curl 33# 3x10     Knee extension 33#  2x10                             Ther Activity                        Gait Training                        Modalities        Cold pack  10' to R knee with no adverse reactions noticed after  10' to R knee with no adverse reactions noticed after 10' to R knee with no adverse reactions noticed after Deferred to home                 Patient was treated by Student Physical Therapist Cornelio Underwood under the direct supervision of Leni Armendariz PT , DPT

## 2021-05-10 ENCOUNTER — OFFICE VISIT (OUTPATIENT)
Dept: PHYSICAL THERAPY | Facility: CLINIC | Age: 56
End: 2021-05-10
Payer: COMMERCIAL

## 2021-05-10 DIAGNOSIS — S83.241A OTHER TEAR OF MEDIAL MENISCUS OF RIGHT KNEE, UNSPECIFIED WHETHER OLD OR CURRENT TEAR, INITIAL ENCOUNTER: Primary | ICD-10-CM

## 2021-05-10 PROCEDURE — 97112 NEUROMUSCULAR REEDUCATION: CPT | Performed by: PHYSICAL MEDICINE & REHABILITATION

## 2021-05-10 PROCEDURE — 97110 THERAPEUTIC EXERCISES: CPT | Performed by: PHYSICAL MEDICINE & REHABILITATION

## 2021-05-10 NOTE — PROGRESS NOTES
PT Re-Evaluation  and PT Discharge    Today's date: 5/10/2021  Patient name: Lety Foy  : 1965  MRN: [id number]  Referring provider: Ash Chou DO  Dx:   Encounter Diagnosis     ICD-10-CM    1  Other tear of medial meniscus of right knee, unspecified whether old or current tear, initial encounter  S83 [id number]                   Assessment  Assessment details: Lety Foy is a 50 y o  female presenting to [address] [place] [address] s/p R knee arthroscopy for a menisectomy  She has been complaint with OPPT to date, attending 10 total tx sessions  Pt notes she has returned to PLOF at present and has even been able to return to the gym without incident, participating in low impact activities; is waiting to f/u with surgeon before returning to [place]  She demonstrates WNL R knee ROM and strength at this time without pain  Her hip and ankle strength have also normalized  Her gait and mobility have normalized with no limitations  She feels she is back to 100% function  Denies pain  RTD   Feels she is ready for dc to HEP at this time  HEP updated and reviewed; pt noted understanding with no questions/concerns  Pt will be d/c to HEP at this time  Thank you for your referral    Understanding of Dx/Px/POC: good   Prognosis: good    Goals  STG - to be achieved in 3 weeks:  1  Pt will increase her R knee flexion ROM by at least 10 degrees in order to tolerate sitting for at least a half hour  -met   2  Pt will increase her R knee extension strength by at least 2 grades to normalize her gait mechanics and decrease her reliance of an AD with walking  -met   3  Pt will decrease max reported pain score to at least 6/10 in order to tolerate negotiate the stairs at her house  -met     LTG- to be achieved in 6 weeks:   1  Pt will increase her R knee ROM to be [facility] in order to improve her functional mobility with transitional movements with sitting and standing - met   2   Pt will increase her R knee strength to be [facility] in order to walk at least a half mile with normal gait mechanics independently  - met   3  Pt will decrease max reported pain score to at least 3/10 in order to return to work with little to no limitations  - met    Plan  Planned therapy interventions: home exercise program  Treatment plan discussed with: patient        Subjective Evaluation    History of Present Illness  Mechanism of injury: Pt notes overall improvements to date with reduced pain, increased ROM, and increased strength R knee/LE  Reports 100% return to PLOF at present  No pain or limitations with ADls  Has returned to the gym with low impact activities and classes without incident  Goes up/down stairs normally  Notes no pain in her knee/leg  Notes minimal intermittent swelling R knee vs L  Reports some stiffness in R knee when she "bends it all the way back" however is not painful  RTD   Very pleased with her progress  Feels she is ready to d/c to Hep at this time  No questions/concerns with HEP  Quality of life: good    Pain  Current pain ratin  At best pain ratin  At worst pain ratin  Quality: tight  Progression: resolved    Social Support  Steps to enter house: yes  Stairs in house: yes   Lives in: multiple-level home  Lives with: spouse    Employment status: not working (trying to get back to work, was previously an  for an [employer])  Hand dominance: right      Diagnostic Tests  MRI studies: abnormal    FCE comments:  Torn R medial meniscus Treatments  Previous treatment: medication  Current treatment: medication and physical therapy  Patient Goals  Patient goals for therapy: decreased pain, decreased edema, improved balance, increased motion, increased strength, independence with ADLs/IADLs and return to sport/leisure activities          Objective     Observations     Additional Observation Details  Incision sites healed/closed  Bruising resolved    Minimal noticeable edema R knee vs L          Palpation     Additional Palpation Details  TTP proximal to incision sites only  No other ttp noted  R knee     Active Range of Motion     Right Knee   Flexion: 145 degrees   Extension: 0 degrees     Additional Active Range of Motion Details  Stiffness with end range knee flexion only  No pain/sx    Passive Range of Motion     Right Knee   Normal passive range of motion    Mobility   Patellar Mobility:     Right Knee   WFL: medial, lateral, superior and inferior    Strength/Myotome Testing     Left Hip   Normal muscle strength    Right Hip   Planes of Motion   Flexion: 4+  Abduction: 4+  Adduction: 4+    Isolated Muscles   Gluteus medius: 4    Left Knee   Flexion: 4+  Extension: 4+  Quadriceps contraction: good    Right Knee   Flexion: 4+  Extension: 4+  Quadriceps contraction: good    Left Ankle/Foot   Normal strength    Right Ankle/Foot   Normal strength    Additional Strength Details  No pain/sx   Good quad contraction with QS and good quad control with SLR flexion without lag  No pain/sx with MMT screen R knee    Will continue to monitor     Ambulation     Observational Gait   Gait: within functional limits   Base of support: increased  Stride length comments: on R     Additional Observational Gait Details  Gait grossly WNL/WFL at this time      Stairs- step over step without HR ascend/descending                Precautions: standard       Re-eval Date: 5/8    Date 4/26 4/29 5/3 5/6 5/10   Visit Count 6 7 8 9 10   FOTO        Pain In 0/10 0/10 0/10 0/10 0/10   Pain Out 0/10 1/10 0/10 0/10  0/10       Manuals 4/26 4/29 5/3 5/6 5/10   R knee flexion/extension PROM with overpressure at end range, patellar mobs  End range knee flexion and ext as fam, patella motion 10' End range knee flexion and ext as fam, patella motion 10' End range knee flexion and ext as fam, patella motion 10' End range knee flexion and ext as fam, patella motion 10' Motion check only     Flex 135   Ext -1 Neuro Re-Ed        Weight shifting onto RLE forward, backward, lateral  Rhomb foam  EO 1x30"  EC 2x30"- NV  Rhomb foam  EO 1x30"  EC 2x30"- NV Rhomb foam  EC 1x30"  EO 2x30" NV   Rhomb foam  EC 2x30"      Tandem foam EO 2x30" ea alt  0 HHA                                                    Ther Ex        NU step rev    QS    Heel slides    Gastroc stretch      LAQs       Mini squats       Seated HS stretch     HR/TR [facility]    heelslides  20x/3-5"    QS- HEP    Clamshells 2x10 R     LAQ 2x10/3-5"     Bridges 2x10    Mini squats  2x10    HR/TR   30x ea SRC L3 10'    heelslides  20x/3-5"        Clamshells  2x10 R     LAQ 2x10/3-5"    Bridges 2x10         [address] L3 10'    HEP           Clamshells green Tband R     LAQ 2x10/3-5" #2    Bridges 2x10 green Tband     90/90 2x10 green TBand     Mini squats 2x10  [address] L3 10'              Clamshells green Tband R           Bridges 2x10 green Tband     90/90 2x10 green TBand     Mini squat   2x10    L3 10'               Clamshells green Tband R           Bridges 2x10 green Tband     90/90 2x10 green TBand          SLR   3 way no ext  3x10/3-5"   3 way no ext  3x10/3-5"     3 way no ext  3x10/3-5"  SLR flex, abd, add mat table  1# 30x ea    Step ups 30x    Lateral steps ups 2x10 NV      NV    step ups x20  step ups x20 8" step  20x 12 in step     Leg press next visit if tolerated Leg press 40# 2x10  Leg press 40# 2x10  Leg press 75# 2x10    SL press 60# 2x10  Leg press 75# 3x10    SL press 60# 3x10       Leg curl 33# 3x10     Knee extension 33#  2x10  Leg curl 33# 3x10     Knee extension 33#  2x10                            Ther Activity                        Gait Training                        Modalities        Cold pack  10' to R knee with no adverse reactions noticed after  10' to R knee with no adverse reactions noticed after 10' to R knee with no adverse reactions noticed after Deferred to home  deferred

## 2021-05-13 ENCOUNTER — HOSPITAL ENCOUNTER (OUTPATIENT)
Dept: MAMMOGRAPHY | Facility: HOSPITAL | Age: 56
Discharge: HOME | End: 2021-05-13
Payer: COMMERCIAL

## 2021-05-13 VITALS — BODY MASS INDEX: 26.21 KG/M2 | WEIGHT: 167 LBS | HEIGHT: 67 IN

## 2021-05-13 DIAGNOSIS — Z12.31 ENCOUNTER FOR SCREENING MAMMOGRAM FOR MALIGNANT NEOPLASM OF BREAST: ICD-10-CM

## 2021-05-13 PROCEDURE — 77067 SCR MAMMO BI INCL CAD: CPT

## 2021-05-13 PROCEDURE — 77063 BREAST TOMOSYNTHESIS BI: CPT

## 2021-05-18 ENCOUNTER — OFFICE VISIT (OUTPATIENT)
Dept: OBGYN CLINIC | Facility: CLINIC | Age: 56
End: 2021-05-18

## 2021-05-18 VITALS
BODY MASS INDEX: 26.21 KG/M2 | SYSTOLIC BLOOD PRESSURE: 152 MMHG | WEIGHT: 167 LBS | DIASTOLIC BLOOD PRESSURE: 94 MMHG | HEART RATE: 97 BPM | HEIGHT: 67 IN

## 2021-05-18 DIAGNOSIS — S83.241D OTHER TEAR OF MEDIAL MENISCUS OF RIGHT KNEE, UNSPECIFIED WHETHER OLD OR CURRENT TEAR, SUBSEQUENT ENCOUNTER: ICD-10-CM

## 2021-05-18 DIAGNOSIS — Z98.890 S/P RIGHT KNEE ARTHROSCOPY: Primary | ICD-10-CM

## 2021-05-18 PROCEDURE — 99024 POSTOP FOLLOW-UP VISIT: CPT | Performed by: ORTHOPAEDIC SURGERY

## 2021-05-18 NOTE — PROGRESS NOTES
Assessment/Plan:  Assessment/Plan   Diagnoses and all orders for this visit:    S/P right knee arthroscopy    Other tear of medial meniscus of right knee, unspecified whether old or current tear, subsequent encounter      Discussed with patient that she has recuperated well postoperatively  She can continue progressive return activities as tolerated without limitations or restrictions  She should try to avoid activities that exacerbate her symptoms  She can continue NSAIDs / Tylenol as needed for pain and soreness  She will be seen for follow-up on an as-needed basis  At this time she is considered clinically resolved in regards to this issue  Patient is in agreement with this plan  The patient is doing quite well  She is back to her previous activity level  Continue home exercise program, continue stretching  Follow up on an as-needed basis    Subjective:   Patient ID: Efraín Nick is a 50 y o  female  HPI    Patient presents for follow-up evaluation s/p right knee arthroscopy with medial meniscectomy and chondroplasty performed 4/6/2021  She is now 6 weeks postop  She reports that she has been consistent with most home exercises and has been able to return to some impact activity such as armida and boot camp training classes without significant symptom exacerbation  She states that she modifies her activities to avoid motions that concern her  She denies any recent bruising, swelling, numbness, or tingling      The following portions of the patient's history were reviewed and updated as appropriate: allergies, current medications, past family history, past medical history, past social history, past surgical history and problem list     Past Medical History:   Diagnosis Date    ADD (attention deficit disorder) 2019    Anxiety 5/31/2013    Arthritis     Herpes simplex     EXTERNAL    IBS (irritable bowel syndrome)     Insomnia     Jaundice     Malignant melanoma in situ ([facility] [address] )     Pneumonia  PONV (postoperative nausea and vomiting)     Reactive airway disease      Past Surgical History:   Procedure Laterality Date    AUGMENTATION BREAST      AUGMENTATION MAMMAPLASTY Bilateral 28years ago    GA KNEE SCOPE,MED/LAT MENISECTOMY Right 4/6/2021    Procedure: KNEE ARTHROSCOPY, medial lateral menisectomy,chondroplasty,synovectomy,inj;  Surgeon: Alia Rosario DO;  Location: [address] [place] [address] OR;  Service: Orthopedics    SKIN BIOPSY  2013    2 PUNCH BIOPSIES    SKIN LESION EXCISION Right 2013    LEG    TUBAL LIGATION       Family History   Problem Relation Age of Onset    Heart disease Father     Diabetes Other         MELLITUS    Hyperlipidemia Other     No Known Problems Mother     No Known Problems Maternal Grandmother     No Known Problems Paternal Grandmother     No Known Problems Maternal Aunt     No Known Problems Maternal Aunt     No Known Problems Maternal Aunt      Social History     Socioeconomic History    Marital status: /Civil Union     Spouse name: None    Number of children: None    Years of education: None    Highest education level: None   Occupational History    Occupation: EMPLOYED   Social Needs    Financial resource strain: None    Food insecurity     Worry: None     Inability: None    Transportation needs     Medical: None     Non-medical: None   Tobacco Use    Smoking status: Current Every Day Smoker     Packs/day: 1 00     Types: Cigarettes    Smokeless tobacco: Never Used   Substance and Sexual Activity    Alcohol use: Yes     Frequency: 2-3 times a week     Drinks per session: 1 or 2    Drug use: No    Sexual activity: Yes     Partners: Male   Lifestyle    Physical activity     Days per week: None     Minutes per session: None    Stress: None   Relationships    Social connections     Talks on phone: None     Gets together: None     Attends [employer] service: None     Active member of club or organization: None     Attends meetings of clubs or organizations: None     Relationship status: None    Intimate partner violence     Fear of current or ex partner: None     Emotionally abused: None     Physically abused: None     Forced sexual activity: None   Other Topics Concern    None   Social History Narrative    EMPLOYED       Current Outpatient Medications:     albuterol (PROVENTIL HFA,VENTOLIN HFA) 90 mcg/act inhaler, INHALE 1 PUFF BY MOUTH EVERY 6 HOURS AS NEEDED FOR WHEEZE, Disp: 6 7 Inhaler, Rfl: 5    bimatoprost (LATISSE) 0 03 % ophthalmic solution, ADMINISTER 2 DROPS TO BOTH EYES DAILY AT BEDTIME PLACE ONE DROP ON APPLICATOR AND APPLY EVENLY ALONG THE SKIN OF THE UPPER EYELID AT BASE OF EYELASHES ONCE DAILY AT BEDTIME REPEAT PROCEDURE FOR SECOND EYE (USE A CLEAN APPLICATOR)  (Patient taking differently: Administer 2 drops to both eyes daily in the early morning Place one drop on applicator and apply evenly along the skin of the upper eyelid at base of eyelashes once daily at bedtime; repeat procedure for second eye (use a clean applicator)  ), Disp: 5 mL, Rfl: 6    escitalopram (Lexapro) 20 mg tablet, Take 1 tablet (20 mg total) by mouth daily, Disp: 90 tablet, Rfl: 3    lisdexamfetamine (VYVANSE) 50 MG capsule, Take 1 capsule (50 mg total) by mouth every morningMax Daily Amount: 50 mg, Disp: 30 capsule, Rfl: 0    meloxicam (MOBIC) 15 mg tablet, Take 1 tablet (15 mg total) by mouth daily, Disp: 30 tablet, Rfl: 0    PREVIDENT 5000 SENSITIVE 1 1-5 % PSTE, BRUSH 1 TIME A DAY BEFORE BED, Disp: , Rfl: 5    valACYclovir (VALTREX) 1,000 mg tablet, TAKE TWO TABS BY MOUTH AT ONSET, THEN TWO TABS 12 HRS   LATER, Disp: , Rfl: 3    zolpidem (AMBIEN CR) 6 25 MG CR tablet, Take 1 tablet (6 25 mg total) by mouth daily at bedtime as needed for sleep, Disp: 30 tablet, Rfl: 5    Allergies   Allergen Reactions    Bacitracin Rash    Neomycin Rash    Polymyxin B Rash    Vilazodone Other (See Comments)     Other reaction(s): abdomen pain     Review of Systems Constitutional: Negative for fatigue  Gastrointestinal: Negative for nausea  Musculoskeletal:        As noted in HPI   Neurological: Negative for dizziness, weakness, numbness and headaches  All other systems reviewed and are negative  Objective:  /94   Pulse 97   Ht 5' 6 5" (1 689 m)   Wt 75 8 kg (167 lb)   BMI 26 55 kg/m²     Ortho Exam   Right Knee -   Weight-bearing status: Full WBAT  Incision: clean, dry, well healed -   No signs of drainage or dehiscence  Skin is warm and dry with no signs of erythema, ecchymosis, or infection  No soft tissue swelling or effusion  ROM: 0-125  Strength: 5/5 MMT throughout  Calf compartments are soft  2+ TP and DP pulses with brisk capillary refill to the toes  Sural, saphenous, tibial, superficial and deep peroneal motor and sensory distributions intact  Sensation light touch intact distally    Physical Exam  Constitutional:       Appearance: She is well-developed  HENT:      Right Ear: External ear normal       Left Ear: External ear normal       Nose: Nose normal    Eyes:      Conjunctiva/sclera: Conjunctivae normal       Pupils: Pupils are equal, round, and reactive to light  Neck:      Musculoskeletal: Normal range of motion  Pulmonary:      Effort: Pulmonary effort is normal    Musculoskeletal: Normal range of motion  Skin:     General: Skin is warm and dry  Neurological:      Mental Status: She is alert and oriented to person, place, and time  Psychiatric:         Behavior: Behavior normal          Thought Content:  Thought content normal          Judgment: Judgment normal          Imaging:   No new imaging reviewed this visit      Scribe Attestation    I,:  Ellen Stewart am acting as a scribe while in the presence of the attending physician :       I,:  Yesenia Garcia DO personally performed the services described in this documentation    as scribed in my presence :

## 2021-07-07 DIAGNOSIS — F90.2 ATTENTION DEFICIT HYPERACTIVITY DISORDER (ADHD), COMBINED TYPE: ICD-10-CM

## 2021-07-07 DIAGNOSIS — B35.1 TOENAIL FUNGUS: Primary | ICD-10-CM

## 2021-07-07 RX ORDER — TERBINAFINE HYDROCHLORIDE 250 MG/1
250 TABLET ORAL DAILY
Qty: 30 TABLET | Refills: 0 | Status: SHIPPED | OUTPATIENT
Start: 2021-07-07 | End: 2021-08-07

## 2021-07-07 NOTE — TELEPHONE ENCOUNTER
Pt called requesting refill on Vyvanse 30 mg and Terbinafine   These medications are not on active list, states she takes Vyvanse 50 mg in am and Vyvanse 30 mg in pm

## 2021-08-10 ENCOUNTER — OFFICE VISIT (OUTPATIENT)
Dept: OBGYN CLINIC | Facility: CLINIC | Age: 56
End: 2021-08-10
Payer: COMMERCIAL

## 2021-08-10 VITALS
HEIGHT: 67 IN | SYSTOLIC BLOOD PRESSURE: 141 MMHG | BODY MASS INDEX: 23.7 KG/M2 | HEART RATE: 81 BPM | WEIGHT: 151 LBS | DIASTOLIC BLOOD PRESSURE: 83 MMHG

## 2021-08-10 DIAGNOSIS — Z98.890 S/P RIGHT KNEE ARTHROSCOPY: Primary | ICD-10-CM

## 2021-08-10 PROCEDURE — 99213 OFFICE O/P EST LOW 20 MIN: CPT | Performed by: ORTHOPAEDIC SURGERY

## 2021-08-10 NOTE — PROGRESS NOTES
Assessment/Plan:  Assessment/Plan   Diagnoses and all orders for this visit:    S/P right knee arthroscopy     Discussed with patient that today's physical exam is essentially benign  She was offered, but declined, corticosteroid injection for relief of pain and inflammation  She can continue activity as tolerated without limitations or restrictions  She will be seen for follow-up on an as-needed basis  Should any questions or concerns arise, or should her symptoms worsen, she can contact the office to schedule follow-up appointment  Patient is in agreement with this treatment plan  it appears the patient sustained a contusion of her right knee during an altercation with her dogs  No obvious trauma  No swelling  Negative Raza's  Strength and motion intact  Would recommend continuation of home exercise program   Continue stretch  If her condition changes, she will not hesitate to let us know  Subjective:   Patient ID: Franklin Wolf is a 50 y o  female  HPI   Patient reports for evaluation of right knee pain x2 weeks  Patient states that she has 5 large dogs in her home, and the dogs were fighting with each other  She suffered a fall while trying to separate the dog fight  She reports having initial onset bruising and swelling in the right knee which has since subsided, but she continues to have discomfort in the right knee and is concerned that she may have sustained another meniscal tear  She describes her pain as being mostly lateral and achy       The following portions of the patient's history were reviewed and updated as appropriate: allergies, current medications, past family history, past medical history, past social history, past surgical history and problem list     Past Medical History:   Diagnosis Date    ADD (attention deficit disorder) 2019    Anxiety 5/31/2013    Arthritis     Herpes simplex     EXTERNAL    IBS (irritable bowel syndrome)     Insomnia     Jaundice     Malignant melanoma in situ ([facility] [address] )     Pneumonia     PONV (postoperative nausea and vomiting)     Reactive airway disease      Past Surgical History:   Procedure Laterality Date    AUGMENTATION BREAST      AUGMENTATION MAMMAPLASTY Bilateral 28years ago    NH KNEE SCOPE,MED/LAT MENISECTOMY Right 4/6/2021    Procedure: KNEE ARTHROSCOPY, medial lateral menisectomy,chondroplasty,synovectomy,inj;  Surgeon: Gallito Goodman DO;  Location: [address] [place] [address] MAIN OR;  Service: Orthopedics    SKIN BIOPSY  2013    2 PUNCH BIOPSIES    SKIN LESION EXCISION Right 2013    LEG    TUBAL LIGATION       Family History   Problem Relation Age of Onset    Heart disease Father     Diabetes Other         MELLITUS    Hyperlipidemia Other     No Known Problems Mother     No Known Problems Maternal Grandmother     No Known Problems Paternal Grandmother     No Known Problems Maternal Aunt     No Known Problems Maternal Aunt     No Known Problems Maternal Aunt      Social History     Socioeconomic History    Marital status: /Civil Union     Spouse name: None    Number of children: None    Years of education: None    Highest education level: None   Occupational History    Occupation: EMPLOYED   Tobacco Use    Smoking status: Current Every Day Smoker     Packs/day: 1 00     Types: Cigarettes    Smokeless tobacco: Never Used   Vaping Use    Vaping Use: Never used   Substance and Sexual Activity    Alcohol use: Yes    Drug use: No    Sexual activity: Yes     Partners: Male   Other Topics Concern    None   Social History Narrative    EMPLOYED     Social Determinants of Health     Financial Resource Strain:     Difficulty of Paying Living Expenses:    Food Insecurity:     Worried About Running Out of Food in the Last Year:     [address] in the Last Year:    Transportation Needs:     Lack of Transportation (Medical):      Lack of Transportation (Non-Medical):    Physical Activity:     Days of Exercise per Week:     Minutes of Exercise per Session:    Stress:     Feeling of Stress :    Social Connections:     Frequency of Communication with Friends and Family:     Frequency of Social Gatherings with Friends and Family:     Attends Anabaptism Services:     Active Member of Clubs or Organizations:     Attends Club or Organization Meetings:     Marital Status:    Intimate Partner Violence:     Fear of Current or Ex-Partner:     Emotionally Abused:     Physically Abused:     Sexually Abused:        Current Outpatient Medications:     albuterol (PROVENTIL HFA,VENTOLIN HFA) 90 mcg/act inhaler, INHALE 1 PUFF BY MOUTH EVERY 6 HOURS AS NEEDED FOR WHEEZE, Disp: 6 7 Inhaler, Rfl: 5    bimatoprost (LATISSE) 0 03 % ophthalmic solution, ADMINISTER 2 DROPS TO BOTH EYES DAILY AT BEDTIME PLACE ONE DROP ON APPLICATOR AND APPLY EVENLY ALONG THE SKIN OF THE UPPER EYELID AT BASE OF EYELASHES ONCE DAILY AT BEDTIME REPEAT PROCEDURE FOR SECOND EYE (USE A CLEAN APPLICATOR)  (Patient taking differently: Administer 2 drops to both eyes daily in the early morning Place one drop on applicator and apply evenly along the skin of the upper eyelid at base of eyelashes once daily at bedtime; repeat procedure for second eye (use a clean applicator)  ), Disp: 5 mL, Rfl: 6    escitalopram (Lexapro) 20 mg tablet, Take 1 tablet (20 mg total) by mouth daily, Disp: 90 tablet, Rfl: 3    lisdexamfetamine (VYVANSE) 30 MG capsule, Take 1 capsule (30 mg total) by mouth every morningMax Daily Amount: 30 mg, Disp: 30 capsule, Rfl: 0    lisdexamfetamine (VYVANSE) 50 MG capsule, Take 1 capsule (50 mg total) by mouth every morningMax Daily Amount: 50 mg, Disp: 30 capsule, Rfl: 0    meloxicam (MOBIC) 15 mg tablet, Take 1 tablet (15 mg total) by mouth daily, Disp: 30 tablet, Rfl: 0    PREVIDENT 5000 SENSITIVE 1 1-5 % PSTE, BRUSH 1 TIME A DAY BEFORE BED, Disp: , Rfl: 5    valACYclovir (VALTREX) 1,000 mg tablet, TAKE TWO TABS BY MOUTH AT ONSET, THEN TWO TABS 12 HRS   LATER, Disp: , Rfl: 3    zolpidem (AMBIEN CR) 6 25 MG CR tablet, Take 1 tablet (6 25 mg total) by mouth daily at bedtime as needed for sleep, Disp: 30 tablet, Rfl: 5    Allergies   Allergen Reactions    Bacitracin Rash    Neomycin Rash    Polymyxin B Rash    Vilazodone Other (See Comments)     Other reaction(s): abdomen pain       Review of Systems   Constitutional: Negative for chills, fever and unexpected weight change  HENT: Negative for hearing loss, nosebleeds and sore throat  Eyes: Negative for pain, redness and visual disturbance  Respiratory: Negative for cough, shortness of breath and wheezing  Cardiovascular: Negative for chest pain, palpitations and leg swelling  Gastrointestinal: Negative for abdominal pain, nausea and vomiting  Endocrine: Negative for polydipsia and polyuria  Genitourinary: Negative for dysuria and hematuria  Musculoskeletal:        As noted in HPI   Skin: Negative for rash and wound  Neurological: Negative for dizziness, numbness and headaches  Psychiatric/Behavioral: Negative for decreased concentration and suicidal ideas  The patient is not nervous/anxious          Objective:  /83   Pulse 81   Ht 5' 6 5" (1 689 m)   Wt 68 5 kg (151 lb)   BMI 24 01 kg/m²     Ortho Exam   Right Knee -   No anatomical deformity  Skin is warm and dry to touch with no signs of erythema, ecchymosis, infection  No soft tissue swelling, no effusion noted  ROM   0° 120°  TTP over  Lateral joint line, mildly TTP over medial joint line  Flexor and extensor mechanisms intact  Knee is stable to varus and valgus stress  - Lachman's  - Anterior Drawer, -- Posterior Drawer  - medial Raza's, - lateral Raza's  - Pivot Shift  Patella tracks centrally without palpable crepitus  Calf compartments are soft and supple  2+ TP and DP pulses with brisk capillary refill to the toes  Sural, saphenous, tibial, superficial and deep peroneal motor and sensory distributions intact  Sensation light touch intact distally      Physical Exam  Constitutional:       Appearance: She is well-developed  HENT:      Right Ear: External ear normal       Left Ear: External ear normal       Nose: Nose normal    Eyes:      Conjunctiva/sclera: Conjunctivae normal       Pupils: Pupils are equal, round, and reactive to light  Pulmonary:      Effort: Pulmonary effort is normal    Musculoskeletal:         General: Normal range of motion  Cervical back: Normal range of motion  Skin:     General: Skin is warm and dry  Neurological:      Mental Status: She is alert and oriented to person, place, and time  Psychiatric:         Behavior: Behavior normal          Thought Content:  Thought content normal          Judgment: Judgment normal          Imaging:  No new imaging reviewed this visit      Scribe Attestation    I,:  Heather Connell am acting as a scribe while in the presence of the attending physician :       I,:  Janie Smith DO personally performed the services described in this documentation    as scribed in my presence :

## 2021-09-09 ENCOUNTER — TELEPHONE (OUTPATIENT)
Dept: INTERNAL MEDICINE CLINIC | Facility: CLINIC | Age: 56
End: 2021-09-09

## 2021-09-10 DIAGNOSIS — F90.2 ATTENTION DEFICIT HYPERACTIVITY DISORDER (ADHD), COMBINED TYPE: ICD-10-CM

## 2021-10-12 DIAGNOSIS — F90.2 ATTENTION DEFICIT HYPERACTIVITY DISORDER (ADHD), COMBINED TYPE: ICD-10-CM

## 2021-10-13 DIAGNOSIS — F90.2 ATTENTION DEFICIT HYPERACTIVITY DISORDER (ADHD), COMBINED TYPE: ICD-10-CM

## 2021-11-08 DIAGNOSIS — F90.2 ATTENTION DEFICIT HYPERACTIVITY DISORDER (ADHD), COMBINED TYPE: ICD-10-CM

## 2021-12-07 DIAGNOSIS — F90.2 ATTENTION DEFICIT HYPERACTIVITY DISORDER (ADHD), COMBINED TYPE: ICD-10-CM

## 2021-12-22 ENCOUNTER — TELEPHONE (OUTPATIENT)
Dept: INTERNAL MEDICINE CLINIC | Facility: CLINIC | Age: 56
End: 2021-12-22

## 2021-12-22 DIAGNOSIS — B34.9 VIRAL INFECTION, UNSPECIFIED: Primary | ICD-10-CM

## 2021-12-22 PROCEDURE — 87636 SARSCOV2 & INF A&B AMP PRB: CPT | Performed by: PHYSICIAN ASSISTANT

## 2021-12-24 LAB
FLUAV RNA RESP QL NAA+PROBE: NEGATIVE
FLUBV RNA RESP QL NAA+PROBE: NEGATIVE
SARS-COV-2 RNA RESP QL NAA+PROBE: NEGATIVE

## 2022-01-04 DIAGNOSIS — F90.2 ATTENTION DEFICIT HYPERACTIVITY DISORDER (ADHD), COMBINED TYPE: ICD-10-CM

## 2022-01-04 NOTE — TELEPHONE ENCOUNTER
Pt needs lisdexamfetamine (VYVANSE) 30 MG capsule and lisdexamfetamine (VYVANSE) 50 MG capsule refilled, send to [facility] in [place]  Her appt is 2/7/22

## 2022-01-05 NOTE — TELEPHONE ENCOUNTER
Requested medication(s) are due for refill today: No  Patient has already received a courtesy refill: Yes  Other reason request has been forwarded to provider: Leydi Sadler   I AM UNABLE TO DECLINE

## 2022-01-06 DIAGNOSIS — F41.9 ANXIETY: ICD-10-CM

## 2022-01-06 DIAGNOSIS — F32.A DEPRESSIVE DISORDER: ICD-10-CM

## 2022-01-06 RX ORDER — ESCITALOPRAM OXALATE 20 MG/1
TABLET ORAL
Qty: 90 TABLET | Refills: 3 | Status: SHIPPED | OUTPATIENT
Start: 2022-01-06 | End: 2022-04-01 | Stop reason: SDUPTHER

## 2022-02-01 DIAGNOSIS — F90.2 ATTENTION DEFICIT HYPERACTIVITY DISORDER (ADHD), COMBINED TYPE: ICD-10-CM

## 2022-02-07 ENCOUNTER — OFFICE VISIT (OUTPATIENT)
Dept: INTERNAL MEDICINE CLINIC | Facility: CLINIC | Age: 57
End: 2022-02-07
Payer: COMMERCIAL

## 2022-02-07 VITALS
SYSTOLIC BLOOD PRESSURE: 130 MMHG | WEIGHT: 140.13 LBS | OXYGEN SATURATION: 98 % | HEIGHT: 67 IN | TEMPERATURE: 98.5 F | HEART RATE: 87 BPM | BODY MASS INDEX: 21.99 KG/M2 | DIASTOLIC BLOOD PRESSURE: 68 MMHG

## 2022-02-07 DIAGNOSIS — C43.59 MALIGNANT MELANOMA OF SKIN OF TRUNK (HCC): ICD-10-CM

## 2022-02-07 DIAGNOSIS — Z13.0 SCREENING FOR DEFICIENCY ANEMIA: ICD-10-CM

## 2022-02-07 DIAGNOSIS — Z13.29 SCREENING FOR THYROID DISORDER: ICD-10-CM

## 2022-02-07 DIAGNOSIS — Z13.1 SCREENING FOR DIABETES MELLITUS: ICD-10-CM

## 2022-02-07 DIAGNOSIS — G47.00 INSOMNIA, UNSPECIFIED TYPE: ICD-10-CM

## 2022-02-07 DIAGNOSIS — E55.9 VITAMIN D DEFICIENCY: ICD-10-CM

## 2022-02-07 DIAGNOSIS — F33.9 DEPRESSION, RECURRENT (HCC): ICD-10-CM

## 2022-02-07 DIAGNOSIS — E78.49 OTHER HYPERLIPIDEMIA: ICD-10-CM

## 2022-02-07 DIAGNOSIS — B35.1 TINEA UNGUIUM: Primary | ICD-10-CM

## 2022-02-07 DIAGNOSIS — Z13.220 SCREENING, LIPID: ICD-10-CM

## 2022-02-07 PROBLEM — Z86.16 HISTORY OF COVID-19: Status: ACTIVE | Noted: 2020-12-06

## 2022-02-07 PROCEDURE — 99214 OFFICE O/P EST MOD 30 MIN: CPT | Performed by: PHYSICIAN ASSISTANT

## 2022-02-07 PROCEDURE — 3008F BODY MASS INDEX DOCD: CPT | Performed by: PHYSICIAN ASSISTANT

## 2022-02-07 PROCEDURE — 3725F SCREEN DEPRESSION PERFORMED: CPT | Performed by: PHYSICIAN ASSISTANT

## 2022-02-07 PROCEDURE — 4004F PT TOBACCO SCREEN RCVD TLK: CPT | Performed by: PHYSICIAN ASSISTANT

## 2022-02-07 RX ORDER — SODIUM FLUORIDE AND POTASSIUM NITRATE 5.8; 57.5 MG/ML; MG/ML
GEL, DENTIFRICE DENTAL
COMMUNITY
Start: 2022-02-01

## 2022-02-07 RX ORDER — ZOLPIDEM TARTRATE 6.25 MG/1
6.25 TABLET, FILM COATED, EXTENDED RELEASE ORAL
Qty: 30 TABLET | Refills: 5 | Status: SHIPPED | OUTPATIENT
Start: 2022-02-07

## 2022-02-07 NOTE — ASSESSMENT & PLAN NOTE
Offered 30d supply of lamisil with LFT check in 1 month  PT defers, wants 90d and no lab monitoring except annual  Defers penlac or podiatry at this time   Will call if she changes her mind

## 2022-02-07 NOTE — PROGRESS NOTES
Assessment/Plan:  Problem List Items Addressed This Visit        Musculoskeletal and Integument    Malignant melanoma of skin of trunk (HCC)    Tinea unguium - Primary     Offered 30d supply of lamisil with LFT check in 1 month  PT defers, wants 90d and no lab monitoring except annual  Defers penlac or podiatry at this time  Will call if she changes her mind            Other    Insomnia    Relevant Medications    zolpidem (AMBIEN CR) 6 25 MG CR tablet    Other hyperlipidemia    Relevant Orders    Lipid panel    Depression, recurrent (HCC)    Relevant Medications    zolpidem (AMBIEN CR) 6 25 MG CR tablet      Other Visit Diagnoses     Vitamin D deficiency        Relevant Orders    Vitamin D 25 hydroxy    Screening, lipid        Screening for diabetes mellitus        Relevant Orders    CBC and differential    Screening for deficiency anemia        Relevant Orders    Comprehensive metabolic panel    Screening for thyroid disorder        Relevant Orders    TSH, 3rd generation with Free T4 reflex           Diagnoses and all orders for this visit:    Tinea unguium    Insomnia, unspecified type  -     zolpidem (AMBIEN CR) 6 25 MG CR tablet; Take 1 tablet (6 25 mg total) by mouth daily at bedtime as needed for sleep    Depression, recurrent ([facility] [address] )    Malignant melanoma of skin of trunk ([facility] [address] )    Other hyperlipidemia  -     Lipid panel; Future    Vitamin D deficiency  -     Vitamin D 25 hydroxy; Future    Screening, lipid    Screening for diabetes mellitus  -     CBC and differential; Future    Screening for deficiency anemia  -     Comprehensive metabolic panel; Future    Screening for thyroid disorder  -     TSH, 3rd generation with Free T4 reflex; Future    Other orders  -     PreviDent 5000 Sensitive 1 1-5 % GEL; BRUSH 1 TIME AT NIGHT FOR ONE MIN NOTHING MY MOUTH FOR 30 MIN        Tinea unguium  Offered 30d supply of lamisil with LFT check in 1 month   PT defers, wants 90d and no lab monitoring except annual  Defers penlac or podiatry at this time  Will call if she changes her mind      Subjective:      Patient ID: Yonny Gale is a 49 y o  female  Pt presents for routine visit  She is doing well overall  She is due for labs  She is due for CRC screening but refuses at the moment  Mammogram is up to date  Covid vaccine booster encouraged  BP is well controlled  She desires restarting lamisil  She states she has been on this for nearly 30 years  She has only have received LFTs annually  She asks for 90d supply  I informed pt that if I prescribe she would have to get LFTs monthly  She was not happy about this  I informed her I would not do 90d due to lack of monitoring occurring previously  We also discussed alternatives since she has been on this so chronically and symptoms remain that it may not even be effective for her  Offered Penlac as well as podiatry referral but she defers and is a bit frustrated by her options  The following portions of the patient's history were reviewed and updated as appropriate:   She has a past medical history of ADD (attention deficit disorder) (2019), Anxiety (5/31/2013), Arthritis, Herpes simplex, IBS (irritable bowel syndrome), Insomnia, Jaundice, Malignant melanoma in situ ([facility] [address] ), Pneumonia, PONV (postoperative nausea and vomiting), and Reactive airway disease  ,  does not have any pertinent problems on file  ,   has a past surgical history that includes Skin lesion excision (Right, 2013); AUGMENTATION BREAST; Skin biopsy (2013); Tubal ligation; pr knee scope,med/lat menisectomy (Right, 4/6/2021); and Augmentation mammaplasty (Bilateral, 28years ago)  ,  family history includes Diabetes in her other; Heart disease in her father; Hyperlipidemia in her other; No Known Problems in her maternal aunt, maternal aunt, maternal aunt, maternal grandmother, mother, and paternal grandmother  ,   reports that she has been smoking cigarettes  She has a 42 00 pack-year smoking history   She has never used smokeless tobacco  She reports current alcohol use  She reports that she does not use drugs  ,  is allergic to bacitracin, neomycin, polymyxin b, and vilazodone     Current Outpatient Medications   Medication Sig Dispense Refill    albuterol (PROVENTIL HFA,VENTOLIN HFA) 90 mcg/act inhaler INHALE 1 PUFF BY MOUTH EVERY 6 HOURS AS NEEDED FOR WHEEZE 6 7 Inhaler 5    bimatoprost (LATISSE) 0 03 % ophthalmic solution ADMINISTER 2 DROPS TO BOTH EYES DAILY AT BEDTIME PLACE ONE DROP ON APPLICATOR AND APPLY EVENLY ALONG THE SKIN OF THE UPPER EYELID AT BASE OF EYELASHES ONCE DAILY AT BEDTIME REPEAT PROCEDURE FOR SECOND EYE (USE A CLEAN APPLICATOR)  (Patient taking differently: Administer 2 drops to both eyes daily in the early morning Place one drop on applicator and apply evenly along the skin of the upper eyelid at base of eyelashes once daily at bedtime; repeat procedure for second eye (use a clean applicator)  ) 5 mL 6    escitalopram (LEXAPRO) 20 mg tablet TAKE 1 TABLET BY MOUTH EVERY DAY 90 tablet 3    lisdexamfetamine (VYVANSE) 30 MG capsule Take 1 capsule (30 mg total) by mouth every morning Max Daily Amount: 30 mg 30 capsule 0    lisdexamfetamine (VYVANSE) 50 MG capsule Take 1 capsule (50 mg total) by mouth every morning Max Daily Amount: 50 mg 30 capsule 0    PreviDent 5000 Sensitive 1 1-5 % GEL BRUSH 1 TIME AT NIGHT FOR ONE MIN NOTHING MY MOUTH FOR 30 MIN      valACYclovir (VALTREX) 1,000 mg tablet TAKE TWO TABS BY MOUTH AT ONSET, THEN TWO TABS 12 HRS  LATER  3    zolpidem (AMBIEN CR) 6 25 MG CR tablet Take 1 tablet (6 25 mg total) by mouth daily at bedtime as needed for sleep 30 tablet 5    meloxicam (MOBIC) 15 mg tablet Take 1 tablet (15 mg total) by mouth daily (Patient not taking: Reported on 2/7/2022 ) 30 tablet 0    PREVIDENT 5000 SENSITIVE 1 1-5 % PSTE BRUSH 1 TIME A DAY BEFORE BED (Patient not taking: Reported on 2/7/2022)  5     No current facility-administered medications for this visit         Review of Systems   Constitutional: Negative for chills and fever  HENT: Negative for congestion, ear pain, hearing loss, postnasal drip, rhinorrhea, sinus pressure, sinus pain, sore throat and trouble swallowing  Eyes: Negative for pain and visual disturbance  Respiratory: Negative for cough, chest tightness, shortness of breath and wheezing  Cardiovascular: Negative  Negative for chest pain, palpitations and leg swelling  Gastrointestinal: Negative for abdominal pain, blood in stool, constipation, diarrhea, nausea and vomiting  Endocrine: Negative for cold intolerance, heat intolerance, polydipsia, polyphagia and polyuria  Genitourinary: Negative for difficulty urinating, dysuria, flank pain and urgency  Musculoskeletal: Negative for arthralgias, back pain, gait problem and myalgias  Skin: Negative for rash  Allergic/Immunologic: Negative  Neurological: Negative for dizziness, weakness, light-headedness and headaches  Hematological: Negative  Psychiatric/Behavioral: Negative for behavioral problems, dysphoric mood and sleep disturbance  The patient is not nervous/anxious  PHQ-2/9 Depression Screening    Little interest or pleasure in doing things: 1 - several days  Feeling down, depressed, or hopeless: 1 - several days  Trouble falling or staying asleep, or sleeping too much: 1 - several days  Feeling tired or having little energy: 1 - several days  Poor appetite or overeatin - several days  Feeling bad about yourself - or that you are a failure or have let yourself or your family down: 1 - several days  Trouble concentrating on things, such as reading the newspaper or watching television: 1 - several days  Moving or speaking so slowly that other people could have noticed   Or the opposite - being so fidgety or restless that you have been moving around a lot more than usual: 1 - several days  Thoughts that you would be better off dead, or of hurting yourself in some way: 0 - not at all  PHQ-9 Score: 8   PHQ-9 Interpretation: Mild depression           Objective:  Vitals:    02/07/22 0848   BP: 130/68   BP Location: Left arm   Patient Position: Sitting   Pulse: 87   Temp: 98 5 °F (36 9 °C)   SpO2: 98%   Weight: 63 6 kg (140 lb 2 oz)   Height: 5' 6 5" (1 689 m)     Body mass index is 22 28 kg/m²  Physical Exam  Constitutional:       General: She is not in acute distress  Appearance: She is well-developed  She is not diaphoretic  HENT:      Head: Normocephalic and atraumatic  Right Ear: External ear normal       Left Ear: External ear normal       Nose: Nose normal       Mouth/Throat:      Pharynx: No oropharyngeal exudate  Eyes:      General: No scleral icterus  Right eye: No discharge  Left eye: No discharge  Conjunctiva/sclera: Conjunctivae normal       Pupils: Pupils are equal, round, and reactive to light  Neck:      Thyroid: No thyromegaly  Cardiovascular:      Rate and Rhythm: Normal rate and regular rhythm  Heart sounds: Normal heart sounds  No murmur heard  No friction rub  No gallop  Pulmonary:      Effort: Pulmonary effort is normal  No respiratory distress  Breath sounds: Normal breath sounds  No wheezing or rales  Abdominal:      General: Bowel sounds are normal  There is no distension  Palpations: Abdomen is soft  Tenderness: There is no abdominal tenderness  Musculoskeletal:         General: No tenderness or deformity  Normal range of motion  Cervical back: Normal range of motion and neck supple  Skin:     General: Skin is warm and dry  Neurological:      Mental Status: She is alert and oriented to person, place, and time  Cranial Nerves: No cranial nerve deficit  Psychiatric:         Behavior: Behavior normal          Thought Content: Thought content normal          Judgment: Judgment normal          Tobacco Cessation Counseling: Tobacco cessation counseling was not provided   The patient is sincerely urged to quit consumption of tobacco  She is not ready to quit tobacco

## 2022-03-01 DIAGNOSIS — F90.2 ATTENTION DEFICIT HYPERACTIVITY DISORDER (ADHD), COMBINED TYPE: ICD-10-CM

## 2022-04-01 DIAGNOSIS — F41.9 ANXIETY: ICD-10-CM

## 2022-04-01 DIAGNOSIS — F32.A DEPRESSIVE DISORDER: ICD-10-CM

## 2022-04-01 DIAGNOSIS — F90.2 ATTENTION DEFICIT HYPERACTIVITY DISORDER (ADHD), COMBINED TYPE: ICD-10-CM

## 2022-04-01 RX ORDER — ESCITALOPRAM OXALATE 20 MG/1
20 TABLET ORAL DAILY
Qty: 90 TABLET | Refills: 0 | Status: SHIPPED | OUTPATIENT
Start: 2022-04-01 | End: 2022-07-21 | Stop reason: SDUPTHER

## 2022-04-26 DIAGNOSIS — L65.9 HAIR LOSS: ICD-10-CM

## 2022-04-26 DIAGNOSIS — F90.2 ATTENTION DEFICIT HYPERACTIVITY DISORDER (ADHD), COMBINED TYPE: ICD-10-CM

## 2022-04-26 RX ORDER — BIMATOPROST 3 UG/ML
SOLUTION TOPICAL
Qty: 5 ML | Refills: 6 | Status: SHIPPED | OUTPATIENT
Start: 2022-04-26

## 2022-05-09 ENCOUNTER — OFFICE VISIT (OUTPATIENT)
Dept: INTERNAL MEDICINE CLINIC | Facility: CLINIC | Age: 57
End: 2022-05-09
Payer: COMMERCIAL

## 2022-05-09 VITALS
BODY MASS INDEX: 21.97 KG/M2 | HEART RATE: 78 BPM | DIASTOLIC BLOOD PRESSURE: 62 MMHG | SYSTOLIC BLOOD PRESSURE: 122 MMHG | HEIGHT: 67 IN | OXYGEN SATURATION: 98 % | TEMPERATURE: 97.6 F | WEIGHT: 140 LBS

## 2022-05-09 DIAGNOSIS — B35.1 TINEA UNGUIUM: Primary | ICD-10-CM

## 2022-05-09 DIAGNOSIS — F90.2 ATTENTION DEFICIT HYPERACTIVITY DISORDER (ADHD), COMBINED TYPE: ICD-10-CM

## 2022-05-09 DIAGNOSIS — F17.210 CIGARETTE NICOTINE DEPENDENCE WITHOUT COMPLICATION: ICD-10-CM

## 2022-05-09 PROCEDURE — 99214 OFFICE O/P EST MOD 30 MIN: CPT | Performed by: PHYSICIAN ASSISTANT

## 2022-05-09 PROCEDURE — 3008F BODY MASS INDEX DOCD: CPT | Performed by: PHYSICIAN ASSISTANT

## 2022-05-09 PROCEDURE — 4004F PT TOBACCO SCREEN RCVD TLK: CPT | Performed by: PHYSICIAN ASSISTANT

## 2022-05-09 NOTE — PROGRESS NOTES
Assessment/Plan:  Problem List Items Addressed This Visit        Musculoskeletal and Integument    Tinea unguium - Primary     Podiatry referral provided  Relevant Orders    Ambulatory Referral to Podiatry       Other    Attention deficit hyperactivity disorder (ADHD), combined type     Pts symptoms are stable with current regime  No changes at present  Relevant Medications    lisdexamfetamine (VYVANSE) 50 MG capsule    lisdexamfetamine (VYVANSE) 30 MG capsule      Other Visit Diagnoses     Cigarette nicotine dependence without complication        Relevant Orders    CT lung screening program           Diagnoses and all orders for this visit:    Tinea unguium  -     Ambulatory Referral to Podiatry; Future    Cigarette nicotine dependence without complication  -     CT lung screening program; Future    Attention deficit hyperactivity disorder (ADHD), combined type  Comments:  will continue on current dosage of Vyvance bid  50mg am and 30mg pm  Orders:  -     lisdexamfetamine (VYVANSE) 50 MG capsule; Take 1 capsule (50 mg total) by mouth every morning Max Daily Amount: 50 mg  -     lisdexamfetamine (VYVANSE) 30 MG capsule; Take 1 capsule (30 mg total) by mouth every morning Max Daily Amount: 30 mg      Attention deficit hyperactivity disorder (ADHD), combined type  Pts symptoms are stable with current regime  No changes at present  Tinea unguium  Podiatry referral provided  Subjective:      Patient ID: Brittny Givens is a 49 y o  female  Pt presents for routine visit  She is doing well overall  She is agreeable to seeing podiatry regarding her onychomycosis  She is due for labs and mammogram  She defers CRC screening  She qualifies for low dose lung cancer screening CT and is agreeable to this as well  Defers Prevnar 20 at this time  She desires 90d supply of her Vyvanse to help with convenience   BP is normal        The following portions of the patient's history were reviewed and updated as appropriate:   She has a past medical history of ADD (attention deficit disorder) (2019), Anxiety (5/31/2013), Arthritis, Herpes simplex, IBS (irritable bowel syndrome), Insomnia, Jaundice, Malignant melanoma in situ ([address] ), Pneumonia, PONV (postoperative nausea and vomiting), and Reactive airway disease  ,  does not have any pertinent problems on file  ,   has a past surgical history that includes Skin lesion excision (Right, 2013); AUGMENTATION BREAST; Skin biopsy (2013); Tubal ligation; pr knee scope,med/lat menisectomy (Right, 4/6/2021); and Augmentation mammaplasty (Bilateral, 28years ago)  ,  family history includes Diabetes in her other; Heart disease in her father; Hyperlipidemia in her other; No Known Problems in her maternal aunt, maternal aunt, maternal aunt, maternal grandmother, mother, and paternal grandmother  ,   reports that she has been smoking cigarettes  She has a 42 00 pack-year smoking history  She has never used smokeless tobacco  She reports current alcohol use  She reports that she does not use drugs  ,  is allergic to bacitracin, neomycin, polymyxin b, and vilazodone     Current Outpatient Medications   Medication Sig Dispense Refill    albuterol (PROVENTIL HFA,VENTOLIN HFA) 90 mcg/act inhaler INHALE 1 PUFF BY MOUTH EVERY 6 HOURS AS NEEDED FOR WHEEZE 6 7 Inhaler 5    bimatoprost (LATISSE) 0 03 % ophthalmic solution PLACE 2 DROPS BOTH EYES AT BEDTIME APPLY EVENLY ALONG WITH SKIN ON THE UPPER EYELID AT BASE OF EYELASHES ONCE DAILY AT BEDTIME REPEAT FOR SECOND EYE 5 mL 6    escitalopram (LEXAPRO) 20 mg tablet Take 1 tablet (20 mg total) by mouth daily 90 tablet 0    lisdexamfetamine (VYVANSE) 30 MG capsule Take 1 capsule (30 mg total) by mouth every morning Max Daily Amount: 30 mg 90 capsule 0    lisdexamfetamine (VYVANSE) 50 MG capsule Take 1 capsule (50 mg total) by mouth every morning Max Daily Amount: 50 mg 90 capsule 0    PreviDent 5000 Sensitive 1 1-5 % GEL BRUSH 1 TIME AT NIGHT FOR ONE MIN NOTHING MY MOUTH FOR 30 MIN      valACYclovir (VALTREX) 1,000 mg tablet TAKE TWO TABS BY MOUTH AT ONSET, THEN TWO TABS 12 HRS  LATER  3    zolpidem (AMBIEN CR) 6 25 MG CR tablet Take 1 tablet (6 25 mg total) by mouth daily at bedtime as needed for sleep 30 tablet 5     No current facility-administered medications for this visit  Review of Systems   Constitutional: Negative for chills and fever  HENT: Negative for congestion, ear pain, hearing loss, postnasal drip, rhinorrhea, sinus pressure, sinus pain, sore throat and trouble swallowing  Eyes: Negative for pain and visual disturbance  Respiratory: Negative for cough, chest tightness, shortness of breath and wheezing  Cardiovascular: Negative  Negative for chest pain, palpitations and leg swelling  Gastrointestinal: Negative for abdominal pain, blood in stool, constipation, diarrhea, nausea and vomiting  Endocrine: Negative for cold intolerance, heat intolerance, polydipsia, polyphagia and polyuria  Genitourinary: Negative for difficulty urinating, dysuria, flank pain and urgency  Musculoskeletal: Negative for arthralgias, back pain, gait problem and myalgias  Skin: Negative for rash  Allergic/Immunologic: Negative  Neurological: Negative for dizziness, weakness, light-headedness and headaches  Hematological: Negative  Psychiatric/Behavioral: Negative for behavioral problems, dysphoric mood and sleep disturbance  The patient is not nervous/anxious  PHQ-2/9 Depression Screening            Objective:  Vitals:    05/09/22 0840   BP: 122/62   BP Location: Left arm   Patient Position: Sitting   Pulse: 78   Temp: 97 6 °F (36 4 °C)   SpO2: 98%   Weight: 63 5 kg (140 lb)   Height: 5' 6 5" (1 689 m)     Body mass index is 22 26 kg/m²  Physical Exam  Constitutional:       General: She is not in acute distress  Appearance: She is well-developed  She is not diaphoretic     HENT:      Head: Normocephalic and atraumatic  Right Ear: External ear normal       Left Ear: External ear normal       Nose: Nose normal       Mouth/Throat:      Pharynx: No oropharyngeal exudate  Eyes:      General: No scleral icterus  Right eye: No discharge  Left eye: No discharge  Conjunctiva/sclera: Conjunctivae normal       Pupils: Pupils are equal, round, and reactive to light  Neck:      Thyroid: No thyromegaly  Cardiovascular:      Rate and Rhythm: Normal rate and regular rhythm  Heart sounds: Normal heart sounds  No murmur heard  No friction rub  No gallop  Pulmonary:      Effort: Pulmonary effort is normal  No respiratory distress  Breath sounds: Normal breath sounds  No wheezing or rales  Abdominal:      General: Bowel sounds are normal  There is no distension  Palpations: Abdomen is soft  Tenderness: There is no abdominal tenderness  Musculoskeletal:         General: No tenderness or deformity  Normal range of motion  Cervical back: Normal range of motion and neck supple  Skin:     General: Skin is warm and dry  Neurological:      Mental Status: She is alert and oriented to person, place, and time  Cranial Nerves: No cranial nerve deficit  Psychiatric:         Behavior: Behavior normal          Thought Content:  Thought content normal          Judgment: Judgment normal

## 2022-08-23 NOTE — PROGRESS NOTES
Assessment/Plan:    No problem-specific Assessment & Plan notes found for this encounter  Diagnoses and all orders for this visit:    Attention deficit hyperactivity disorder (ADHD), predominantly inattentive type  Comments:  Pt reports no symptom relief of anxiety or hyperactivity with Vynase 30 mg will increase to 40mg daily  Orders:  -     lisdexamfetamine (VYVANSE) 40 MG capsule; Take 1 capsule (40 mg total) by mouth every morning Max Daily Amount: 40 mg    Anxiety  Comments:  Pt currently using Lexapro and reports decrease in anxiety   Orders:  -     lisdexamfetamine (VYVANSE) 40 MG capsule; Take 1 capsule (40 mg total) by mouth every morning Max Daily Amount: 40 mg  -     escitalopram (LEXAPRO) 20 mg tablet; Take 1 tablet (20 mg total) by mouth daily    Overweight (BMI 25 0-29  9)  Comments:  Pt has 5 lb weight loss since last visit    Depressive disorder  Comments:  Rx for Lexapro ordered  Orders:  -     escitalopram (LEXAPRO) 20 mg tablet; Take 1 tablet (20 mg total) by mouth daily          Subjective:      Patient ID: Rolando Lares is a 48 y o  female  Pt here with check on use of Vynase for ADHD  Pt reports she does not notice any relief from anxiety, hyperactivity or ability to focus  I will increase the dosage  Pt has 5 lb weight loss since last visit  The following portions of the patient's history were reviewed and updated as appropriate:   She  has a past medical history of Anxiety (5/31/2013); Arthritis; Herpes simplex; IBS (irritable bowel syndrome); Insomnia; Malignant melanoma in situ ([address] ); and Reactive airway disease    She   Patient Active Problem List    Diagnosis Date Noted    Overweight (BMI 25 0-29 9) 11/29/2018    Elevated BP without diagnosis of hypertension 10/23/2018    Acute sinusitis 09/27/2018    Medial epicondylitis, left elbow 02/16/2017    Major depression, single episode 08/11/2016    Constipation 11/03/2014    Depressive disorder 11/03/2014    Malignant pls call pt    US shows possible lipoma    pls have her see Dr Mondragon now to discuss removal   melanoma of skin of trunk ([facility] [address] ) 11/03/2014    Insomnia 05/31/2013    Irritable bowel syndrome 05/31/2013    Anxiety 05/31/2013     She  has a past surgical history that includes Skin lesion excision (Right, 2013); AUGMENTATION BREAST; and Skin biopsy (2013)  Her family history includes Diabetes in her other; Heart disease in her father; Hyperlipidemia in her other  She  reports that she has been smoking Cigarettes  She has been smoking about 1 00 pack per day  She uses smokeless tobacco  She reports that she drinks alcohol  She reports that she does not use drugs  Current Outpatient Prescriptions   Medication Sig Dispense Refill    albuterol (VENTOLIN HFA) 90 mcg/act inhaler Inhale 2 puffs every 6 (six) hours as needed for wheezing 18 g 1    escitalopram (LEXAPRO) 20 mg tablet Take 1 tablet (20 mg total) by mouth daily 90 tablet 3    lisdexamfetamine (VYVANSE) 40 MG capsule Take 1 capsule (40 mg total) by mouth every morning Max Daily Amount: 40 mg 30 capsule 0    meclizine (ANTIVERT) 25 mg tablet as needed      PREVIDENT 5000 SENSITIVE 1 1-5 % PSTE BRUSH 1 TIME A DAY BEFORE BED  5    terbinafine (LAMISIL) 250 mg tablet every 24 hours      valACYclovir (VALTREX) 1,000 mg tablet TAKE TWO TABS BY MOUTH AT ONSET, THEN TWO TABS 12 HRS  LATER  3    zolpidem (AMBIEN CR) 12 5 MG CR tablet Take 1 tablet (12 5 mg total) by mouth daily at bedtime as needed for sleep 30 tablet 0    fluticasone (FLONASE) 50 mcg/act nasal spray 2 sprays into each nostril daily (Patient not taking: Reported on 12/27/2018 ) 16 g 0    fluticasone-vilanterol (BREO ELLIPTA) 100-25 mcg/inh inhaler Inhale 1 puff daily Rinse mouth after use  (Patient not taking: Reported on 12/27/2018 ) 1 Inhaler 1     No current facility-administered medications for this visit        Current Outpatient Prescriptions on File Prior to Visit   Medication Sig    albuterol (VENTOLIN HFA) 90 mcg/act inhaler Inhale 2 puffs every 6 (six) hours as needed for wheezing    meclizine (ANTIVERT) 25 mg tablet as needed    PREVIDENT 5000 SENSITIVE 1 1-5 % PSTE BRUSH 1 TIME A DAY BEFORE BED    terbinafine (LAMISIL) 250 mg tablet every 24 hours    valACYclovir (VALTREX) 1,000 mg tablet TAKE TWO TABS BY MOUTH AT ONSET, THEN TWO TABS 12 HRS  LATER    zolpidem (AMBIEN CR) 12 5 MG CR tablet Take 1 tablet (12 5 mg total) by mouth daily at bedtime as needed for sleep    [DISCONTINUED] escitalopram (LEXAPRO) 20 mg tablet Take 1 tablet (20 mg total) by mouth daily    [DISCONTINUED] lisdexamfetamine (VYVANSE) 30 MG capsule Take 1 capsule (30 mg total) by mouth every morning Max Daily Amount: 30 mg    fluticasone (FLONASE) 50 mcg/act nasal spray 2 sprays into each nostril daily (Patient not taking: Reported on 12/27/2018 )    fluticasone-vilanterol (BREO ELLIPTA) 100-25 mcg/inh inhaler Inhale 1 puff daily Rinse mouth after use  (Patient not taking: Reported on 12/27/2018 )     No current facility-administered medications on file prior to visit  She is allergic to vilazodone; zolpidem; bacitracin; neomycin; and polymyxin b       Review of Systems   Constitutional: Negative  Negative for fatigue  HENT: Negative  Negative for congestion, postnasal drip, rhinorrhea, sinus pain, sore throat and trouble swallowing  Eyes: Negative  Negative for pain and visual disturbance  Respiratory: Negative  Negative for cough, shortness of breath and wheezing  Cardiovascular: Negative  Negative for chest pain and palpitations  Gastrointestinal: Negative  Negative for constipation, diarrhea, nausea and vomiting  Endocrine: Negative  Negative for polydipsia, polyphagia and polyuria  Genitourinary: Negative  Negative for difficulty urinating, flank pain, frequency and pelvic pain  Musculoskeletal: Negative  Negative for arthralgias, back pain, joint swelling and myalgias  Skin: Negative  Negative for color change and rash  Allergic/Immunologic: Negative  Neurological: Negative  Negative for dizziness, syncope, weakness, light-headedness, numbness and headaches  Hematological: Negative  Negative for adenopathy  Does not bruise/bleed easily  Psychiatric/Behavioral: Negative for behavioral problems and sleep disturbance  The patient is nervous/anxious and is hyperactive  Objective:      /88   Pulse 93   Temp 98 3 °F (36 8 °C) (Tympanic)   Ht 5' 6 25" (1 683 m)   Wt 74 8 kg (164 lb 12 8 oz)   SpO2 98%   BMI 26 40 kg/m²          Physical Exam   Constitutional: She is oriented to person, place, and time  She appears well-developed and well-nourished  HENT:   Head: Normocephalic  Eyes: Pupils are equal, round, and reactive to light  Neck: Normal range of motion  Cardiovascular: Normal rate and regular rhythm  Pulmonary/Chest: Effort normal and breath sounds normal    Abdominal: Soft  Bowel sounds are normal    Musculoskeletal: Normal range of motion  Neurological: She is alert and oriented to person, place, and time  Skin: Skin is warm and dry  Psychiatric: Judgment and thought content normal  Her mood appears anxious  She is hyperactive  Nursing note and vitals reviewed

## 2022-08-24 DIAGNOSIS — F90.2 ATTENTION DEFICIT HYPERACTIVITY DISORDER (ADHD), COMBINED TYPE: ICD-10-CM

## 2022-09-28 ENCOUNTER — OFFICE VISIT (OUTPATIENT)
Dept: INTERNAL MEDICINE CLINIC | Facility: CLINIC | Age: 57
End: 2022-09-28
Payer: COMMERCIAL

## 2022-09-28 VITALS
TEMPERATURE: 97.8 F | OXYGEN SATURATION: 97 % | WEIGHT: 141.13 LBS | BODY MASS INDEX: 22.15 KG/M2 | DIASTOLIC BLOOD PRESSURE: 62 MMHG | HEART RATE: 84 BPM | SYSTOLIC BLOOD PRESSURE: 126 MMHG | HEIGHT: 67 IN

## 2022-09-28 DIAGNOSIS — B00.1 COLD SORE: ICD-10-CM

## 2022-09-28 DIAGNOSIS — R20.0 NUMBNESS AND TINGLING: Primary | ICD-10-CM

## 2022-09-28 DIAGNOSIS — M25.541 ARTHRALGIA OF BOTH HANDS: ICD-10-CM

## 2022-09-28 DIAGNOSIS — R20.2 NUMBNESS AND TINGLING: Primary | ICD-10-CM

## 2022-09-28 DIAGNOSIS — M25.542 ARTHRALGIA OF BOTH HANDS: ICD-10-CM

## 2022-09-28 DIAGNOSIS — G47.00 INSOMNIA, UNSPECIFIED TYPE: ICD-10-CM

## 2022-09-28 PROCEDURE — 99214 OFFICE O/P EST MOD 30 MIN: CPT | Performed by: PHYSICIAN ASSISTANT

## 2022-09-28 RX ORDER — VALACYCLOVIR HYDROCHLORIDE 1 G/1
1000 TABLET, FILM COATED ORAL 3 TIMES DAILY
Qty: 21 TABLET | Refills: 3 | Status: SHIPPED | OUTPATIENT
Start: 2022-09-28 | End: 2022-10-27

## 2022-09-28 RX ORDER — ZOLPIDEM TARTRATE 6.25 MG/1
6.25 TABLET, FILM COATED, EXTENDED RELEASE ORAL
Qty: 30 TABLET | Refills: 5 | Status: SHIPPED | OUTPATIENT
Start: 2022-09-28

## 2022-09-28 NOTE — ASSESSMENT & PLAN NOTE
Continue current medications  Update labs to better evaluate   Proceed with chiropractor visit and call if symptoms fail to improve

## 2022-09-28 NOTE — PROGRESS NOTES
Name: Marta Alonso      : 1965      MRN: [id number]  Encounter Provider: Kel Hutchinson PA-C  Encounter Date: 2022   Encounter department: [address] [place] [address]  Numbness and tingling  Assessment & Plan:  Continue current medications  Update labs to better evaluate  Proceed with chiropractor visit and call if symptoms fail to improve    Orders:  -     RON Screen w/ Reflex to Titer/Pattern; Future  -     Lyme Total Antibody Profile with reflex to WB; Future  -     RF Screen w/ Reflex to Titer; Future  -     Sjogren's Antibodies; Future  -     Vitamin B12; Future    2  Arthralgia of both hands  -     RON Screen w/ Reflex to Titer/Pattern; Future  -     Lyme Total Antibody Profile with reflex to WB; Future  -     RF Screen w/ Reflex to Titer; Future  -     Sjogren's Antibodies; Future  -     Vitamin B12; Future    3  Insomnia, unspecified type  -     zolpidem (AMBIEN CR) 6 25 MG CR tablet; Take 1 tablet (6 25 mg total) by mouth daily at bedtime as needed for sleep    4  Cold sore  -     valACYclovir (VALTREX) 1,000 mg tablet; Take 1 tablet (1,000 mg total) by mouth 3 (three) times a day for 7 days           Subjective      Pt presents for routine visit  She is doing well overall  She is due for labs, low dose screening CT of the chest, and CRC screening  She is noting increased numbness, tingling, and weakness in her bilateral upper extremities  She is scheduling with chiropractor for manipulation  Review of Systems   Constitutional: Negative for chills and fever  HENT: Negative for congestion, ear pain, hearing loss, postnasal drip, rhinorrhea, sinus pressure, sinus pain, sore throat and trouble swallowing  Eyes: Negative for pain and visual disturbance  Respiratory: Negative for cough, chest tightness, shortness of breath and wheezing  Cardiovascular: Negative  Negative for chest pain, palpitations and leg swelling     Gastrointestinal: Negative for abdominal pain, blood in stool, constipation, diarrhea, nausea and vomiting  Endocrine: Negative for cold intolerance, heat intolerance, polydipsia, polyphagia and polyuria  Genitourinary: Negative for difficulty urinating, dysuria, flank pain and urgency  Musculoskeletal: Positive for neck pain  Negative for arthralgias, back pain, gait problem and myalgias  Skin: Negative for rash  Allergic/Immunologic: Negative  Neurological: Positive for weakness and numbness  Negative for dizziness, light-headedness and headaches  Hematological: Negative  Psychiatric/Behavioral: Negative for behavioral problems, dysphoric mood and sleep disturbance  The patient is not nervous/anxious  Current Outpatient Medications on File Prior to Visit   Medication Sig    albuterol (PROVENTIL HFA,VENTOLIN HFA) 90 mcg/act inhaler INHALE 1 PUFF BY MOUTH EVERY 6 HOURS AS NEEDED FOR WHEEZE    escitalopram (LEXAPRO) 20 mg tablet Take 1 tablet (20 mg total) by mouth daily    lisdexamfetamine (VYVANSE) 30 MG capsule Take 1 capsule (30 mg total) by mouth every morning Max Daily Amount: 30 mg    lisdexamfetamine (VYVANSE) 50 MG capsule Take 1 capsule (50 mg total) by mouth every morning Max Daily Amount: 50 mg    PreviDent 5000 Sensitive 1 1-5 % GEL BRUSH 1 TIME AT NIGHT FOR ONE MIN NOTHING MY MOUTH FOR 30 MIN    [DISCONTINUED] valACYclovir (VALTREX) 1,000 mg tablet TAKE TWO TABS BY MOUTH AT ONSET, THEN TWO TABS 12 HRS   LATER    [DISCONTINUED] zolpidem (AMBIEN CR) 6 25 MG CR tablet Take 1 tablet (6 25 mg total) by mouth daily at bedtime as needed for sleep    bimatoprost (LATISSE) 0 03 % ophthalmic solution PLACE 2 DROPS BOTH EYES AT BEDTIME APPLY EVENLY ALONG WITH SKIN ON THE UPPER EYELID AT BASE OF EYELASHES ONCE DAILY AT BEDTIME REPEAT FOR SECOND EYE (Patient not taking: Reported on 9/28/2022)       Objective     /62 (BP Location: Left arm, Patient Position: Sitting)   Pulse 84   Temp 97 8 °F (36 6 °C)   Ht 5' 6 5" (1 689 m)   Wt 64 kg (141 lb 2 oz)   SpO2 97%   BMI 22 44 kg/m²     Physical Exam  Constitutional:       General: She is not in acute distress  Appearance: She is well-developed  She is not diaphoretic  HENT:      Head: Normocephalic and atraumatic  Right Ear: External ear normal       Left Ear: External ear normal       Nose: Nose normal       Mouth/Throat:      Pharynx: No oropharyngeal exudate  Eyes:      General: No scleral icterus  Right eye: No discharge  Left eye: No discharge  Conjunctiva/sclera: Conjunctivae normal       Pupils: Pupils are equal, round, and reactive to light  Neck:      Thyroid: No thyromegaly  Cardiovascular:      Rate and Rhythm: Normal rate and regular rhythm  Heart sounds: Normal heart sounds  No murmur heard  No friction rub  No gallop  Pulmonary:      Effort: Pulmonary effort is normal  No respiratory distress  Breath sounds: Normal breath sounds  No wheezing or rales  Abdominal:      General: Bowel sounds are normal  There is no distension  Palpations: Abdomen is soft  Tenderness: There is no abdominal tenderness  Musculoskeletal:         General: No tenderness or deformity  Normal range of motion  Cervical back: Normal range of motion and neck supple  Spasms present  Skin:     General: Skin is warm and dry  Neurological:      Mental Status: She is alert and oriented to person, place, and time  Cranial Nerves: No cranial nerve deficit  Sensory: Sensory deficit present  Psychiatric:         Behavior: Behavior normal          Thought Content:  Thought content normal          Judgment: Judgment normal        Janine Nix PA-C

## 2022-10-04 ENCOUNTER — APPOINTMENT (OUTPATIENT)
Dept: LAB | Facility: CLINIC | Age: 57
End: 2022-10-04
Payer: COMMERCIAL

## 2022-10-04 DIAGNOSIS — R20.0 NUMBNESS AND TINGLING: ICD-10-CM

## 2022-10-04 DIAGNOSIS — R20.2 NUMBNESS AND TINGLING: ICD-10-CM

## 2022-10-04 DIAGNOSIS — Z13.0 SCREENING FOR DEFICIENCY ANEMIA: ICD-10-CM

## 2022-10-04 DIAGNOSIS — E78.49 OTHER HYPERLIPIDEMIA: ICD-10-CM

## 2022-10-04 DIAGNOSIS — M25.542 ARTHRALGIA OF BOTH HANDS: ICD-10-CM

## 2022-10-04 DIAGNOSIS — Z13.1 SCREENING FOR DIABETES MELLITUS: ICD-10-CM

## 2022-10-04 DIAGNOSIS — E55.9 VITAMIN D DEFICIENCY: ICD-10-CM

## 2022-10-04 DIAGNOSIS — Z13.29 SCREENING FOR THYROID DISORDER: ICD-10-CM

## 2022-10-04 DIAGNOSIS — M25.541 ARTHRALGIA OF BOTH HANDS: ICD-10-CM

## 2022-10-04 LAB
25(OH)D3 SERPL-MCNC: 53.8 NG/ML (ref 30–100)
ALBUMIN SERPL BCP-MCNC: 3.6 G/DL (ref 3.5–5)
ALP SERPL-CCNC: 67 U/L (ref 46–116)
ALT SERPL W P-5'-P-CCNC: 23 U/L (ref 12–78)
ANION GAP SERPL CALCULATED.3IONS-SCNC: 3 MMOL/L (ref 4–13)
AST SERPL W P-5'-P-CCNC: 13 U/L (ref 5–45)
BASOPHILS # BLD AUTO: 0.07 THOUSANDS/ΜL (ref 0–0.1)
BASOPHILS NFR BLD AUTO: 1 % (ref 0–1)
BILIRUB SERPL-MCNC: 0.25 MG/DL (ref 0.2–1)
BUN SERPL-MCNC: 17 MG/DL (ref 5–25)
CALCIUM SERPL-MCNC: 9 MG/DL (ref 8.3–10.1)
CHLORIDE SERPL-SCNC: 111 MMOL/L (ref 96–108)
CHOLEST SERPL-MCNC: 186 MG/DL
CO2 SERPL-SCNC: 29 MMOL/L (ref 21–32)
CREAT SERPL-MCNC: 0.78 MG/DL (ref 0.6–1.3)
EOSINOPHIL # BLD AUTO: 0.23 THOUSAND/ΜL (ref 0–0.61)
EOSINOPHIL NFR BLD AUTO: 4 % (ref 0–6)
ERYTHROCYTE [DISTWIDTH] IN BLOOD BY AUTOMATED COUNT: 13.1 % (ref 11.6–15.1)
GFR SERPL CREATININE-BSD FRML MDRD: 85 ML/MIN/1.73SQ M
GLUCOSE P FAST SERPL-MCNC: 92 MG/DL (ref 65–99)
HCT VFR BLD AUTO: 41.7 % (ref 34.8–46.1)
HDLC SERPL-MCNC: 74 MG/DL
HGB BLD-MCNC: 13.2 G/DL (ref 11.5–15.4)
IMM GRANULOCYTES # BLD AUTO: 0.01 THOUSAND/UL (ref 0–0.2)
IMM GRANULOCYTES NFR BLD AUTO: 0 % (ref 0–2)
LDLC SERPL CALC-MCNC: 99 MG/DL (ref 0–100)
LYMPHOCYTES # BLD AUTO: 1.86 THOUSANDS/ΜL (ref 0.6–4.47)
LYMPHOCYTES NFR BLD AUTO: 32 % (ref 14–44)
MCH RBC QN AUTO: 28.9 PG (ref 26.8–34.3)
MCHC RBC AUTO-ENTMCNC: 31.7 G/DL (ref 31.4–37.4)
MCV RBC AUTO: 91 FL (ref 82–98)
MONOCYTES # BLD AUTO: 0.39 THOUSAND/ΜL (ref 0.17–1.22)
MONOCYTES NFR BLD AUTO: 7 % (ref 4–12)
NEUTROPHILS # BLD AUTO: 3.24 THOUSANDS/ΜL (ref 1.85–7.62)
NEUTS SEG NFR BLD AUTO: 56 % (ref 43–75)
NONHDLC SERPL-MCNC: 112 MG/DL
NRBC BLD AUTO-RTO: 0 /100 WBCS
PLATELET # BLD AUTO: 264 THOUSANDS/UL (ref 149–390)
PMV BLD AUTO: 11.3 FL (ref 8.9–12.7)
POTASSIUM SERPL-SCNC: 4.8 MMOL/L (ref 3.5–5.3)
PROT SERPL-MCNC: 6.4 G/DL (ref 6.4–8.4)
RBC # BLD AUTO: 4.56 MILLION/UL (ref 3.81–5.12)
SODIUM SERPL-SCNC: 143 MMOL/L (ref 135–147)
TRIGL SERPL-MCNC: 63 MG/DL
TSH SERPL DL<=0.05 MIU/L-ACNC: 2.48 UIU/ML (ref 0.45–4.5)
VIT B12 SERPL-MCNC: 677 PG/ML (ref 100–900)
WBC # BLD AUTO: 5.8 THOUSAND/UL (ref 4.31–10.16)

## 2022-10-04 PROCEDURE — 85025 COMPLETE CBC W/AUTO DIFF WBC: CPT

## 2022-10-04 PROCEDURE — 86430 RHEUMATOID FACTOR TEST QUAL: CPT

## 2022-10-04 PROCEDURE — 82306 VITAMIN D 25 HYDROXY: CPT

## 2022-10-04 PROCEDURE — 84443 ASSAY THYROID STIM HORMONE: CPT

## 2022-10-04 PROCEDURE — 80053 COMPREHEN METABOLIC PANEL: CPT

## 2022-10-04 PROCEDURE — 86618 LYME DISEASE ANTIBODY: CPT

## 2022-10-04 PROCEDURE — 86038 ANTINUCLEAR ANTIBODIES: CPT

## 2022-10-04 PROCEDURE — 86235 NUCLEAR ANTIGEN ANTIBODY: CPT

## 2022-10-04 PROCEDURE — 36415 COLL VENOUS BLD VENIPUNCTURE: CPT

## 2022-10-04 PROCEDURE — 82607 VITAMIN B-12: CPT

## 2022-10-04 PROCEDURE — 80061 LIPID PANEL: CPT

## 2022-10-05 LAB
B BURGDOR IGG+IGM SER-ACNC: 0.2 AI
ENA SS-A AB SER-ACNC: <0.2 AI (ref 0–0.9)
ENA SS-B AB SER-ACNC: <0.2 AI (ref 0–0.9)
RHEUMATOID FACT SER QL LA: NEGATIVE
RYE IGE QN: NEGATIVE

## 2022-10-14 ENCOUNTER — APPOINTMENT (OUTPATIENT)
Dept: URGENT CARE | Facility: MEDICAL CENTER | Age: 57
End: 2022-10-14
Payer: OTHER MISCELLANEOUS

## 2022-10-14 PROCEDURE — 99213 OFFICE O/P EST LOW 20 MIN: CPT | Performed by: PHYSICIAN ASSISTANT

## 2022-10-17 ENCOUNTER — OCCMED (OUTPATIENT)
Dept: URGENT CARE | Facility: MEDICAL CENTER | Age: 57
End: 2022-10-17
Payer: OTHER MISCELLANEOUS

## 2022-10-17 ENCOUNTER — APPOINTMENT (OUTPATIENT)
Dept: RADIOLOGY | Facility: MEDICAL CENTER | Age: 57
End: 2022-10-17
Payer: OTHER MISCELLANEOUS

## 2022-10-17 DIAGNOSIS — S69.92XD INJURY OF LEFT HAND, SUBSEQUENT ENCOUNTER: Primary | ICD-10-CM

## 2022-10-17 DIAGNOSIS — S69.92XD INJURY OF LEFT HAND, SUBSEQUENT ENCOUNTER: ICD-10-CM

## 2022-10-17 PROCEDURE — 99213 OFFICE O/P EST LOW 20 MIN: CPT

## 2022-10-17 PROCEDURE — 73130 X-RAY EXAM OF HAND: CPT

## 2022-10-19 ENCOUNTER — ANESTHESIA (OUTPATIENT)
Dept: PERIOP | Facility: HOSPITAL | Age: 57
DRG: 269 | End: 2022-10-19
Payer: OTHER MISCELLANEOUS

## 2022-10-19 ENCOUNTER — OFFICE VISIT (OUTPATIENT)
Dept: INTERNAL MEDICINE CLINIC | Facility: CLINIC | Age: 57
End: 2022-10-19
Payer: COMMERCIAL

## 2022-10-19 ENCOUNTER — APPOINTMENT (OUTPATIENT)
Dept: URGENT CARE | Facility: MEDICAL CENTER | Age: 57
End: 2022-10-19
Payer: OTHER MISCELLANEOUS

## 2022-10-19 ENCOUNTER — ANESTHESIA EVENT (OUTPATIENT)
Dept: PERIOP | Facility: HOSPITAL | Age: 57
DRG: 269 | End: 2022-10-19
Payer: OTHER MISCELLANEOUS

## 2022-10-19 ENCOUNTER — OFFICE VISIT (OUTPATIENT)
Dept: OBGYN CLINIC | Facility: CLINIC | Age: 57
End: 2022-10-19
Payer: OTHER MISCELLANEOUS

## 2022-10-19 ENCOUNTER — HOSPITAL ENCOUNTER (INPATIENT)
Facility: HOSPITAL | Age: 57
LOS: 5 days | Discharge: HOME | DRG: 269 | End: 2022-10-24
Attending: ORTHOPAEDIC SURGERY | Admitting: ORTHOPAEDIC SURGERY
Payer: OTHER MISCELLANEOUS

## 2022-10-19 ENCOUNTER — TELEPHONE (OUTPATIENT)
Dept: INTERNAL MEDICINE CLINIC | Facility: CLINIC | Age: 57
End: 2022-10-19

## 2022-10-19 VITALS
HEIGHT: 67 IN | SYSTOLIC BLOOD PRESSURE: 152 MMHG | WEIGHT: 143.25 LBS | OXYGEN SATURATION: 97 % | HEART RATE: 96 BPM | TEMPERATURE: 98.6 F | DIASTOLIC BLOOD PRESSURE: 80 MMHG | BODY MASS INDEX: 22.48 KG/M2

## 2022-10-19 VITALS — BODY MASS INDEX: 22.44 KG/M2 | HEIGHT: 67 IN | WEIGHT: 143 LBS

## 2022-10-19 DIAGNOSIS — L08.9 PUNCTURE WOUND OF LEFT HAND WITH INFECTION, INITIAL ENCOUNTER: Primary | ICD-10-CM

## 2022-10-19 DIAGNOSIS — T80.1XXA: ICD-10-CM

## 2022-10-19 DIAGNOSIS — S61.412A LACERATION OF LEFT HAND WITH INFECTION, INITIAL ENCOUNTER: ICD-10-CM

## 2022-10-19 DIAGNOSIS — L03.114 CELLULITIS OF LEFT HAND: Primary | ICD-10-CM

## 2022-10-19 DIAGNOSIS — L08.9 LACERATION OF LEFT HAND WITH INFECTION, INITIAL ENCOUNTER: ICD-10-CM

## 2022-10-19 DIAGNOSIS — S61.432A PUNCTURE WOUND OF LEFT HAND WITH INFECTION, INITIAL ENCOUNTER: Primary | ICD-10-CM

## 2022-10-19 LAB
ANION GAP SERPL CALCULATED.3IONS-SCNC: 8 MMOL/L (ref 4–13)
BASOPHILS # BLD AUTO: 0.07 THOUSANDS/ÂΜL (ref 0–0.1)
BASOPHILS NFR BLD AUTO: 1 % (ref 0–1)
BUN SERPL-MCNC: 13 MG/DL (ref 5–25)
CALCIUM SERPL-MCNC: 9.8 MG/DL (ref 8.4–10.2)
CHLORIDE SERPL-SCNC: 100 MMOL/L (ref 96–108)
CO2 SERPL-SCNC: 27 MMOL/L (ref 21–32)
CREAT SERPL-MCNC: 0.85 MG/DL (ref 0.6–1.3)
CRP SERPL QL: 3.2 MG/L
EOSINOPHIL # BLD AUTO: 0.17 THOUSAND/ÂΜL (ref 0–0.61)
EOSINOPHIL NFR BLD AUTO: 2 % (ref 0–6)
ERYTHROCYTE [DISTWIDTH] IN BLOOD BY AUTOMATED COUNT: 12.5 % (ref 11.6–15.1)
ERYTHROCYTE [SEDIMENTATION RATE] IN BLOOD: 10 MM/HOUR (ref 0–29)
GFR SERPL CREATININE-BSD FRML MDRD: 77 ML/MIN/1.73SQ M
GLUCOSE SERPL-MCNC: 85 MG/DL (ref 65–140)
HCT VFR BLD AUTO: 38.1 % (ref 34.8–46.1)
HGB BLD-MCNC: 12.8 G/DL (ref 11.5–15.4)
IMM GRANULOCYTES # BLD AUTO: 0.02 THOUSAND/UL (ref 0–0.2)
IMM GRANULOCYTES NFR BLD AUTO: 0 % (ref 0–2)
LYMPHOCYTES # BLD AUTO: 3.05 THOUSANDS/ÂΜL (ref 0.6–4.47)
LYMPHOCYTES NFR BLD AUTO: 36 % (ref 14–44)
MCH RBC QN AUTO: 30.3 PG (ref 26.8–34.3)
MCHC RBC AUTO-ENTMCNC: 33.6 G/DL (ref 31.4–37.4)
MCV RBC AUTO: 90 FL (ref 82–98)
MONOCYTES # BLD AUTO: 0.54 THOUSAND/ÂΜL (ref 0.17–1.22)
MONOCYTES NFR BLD AUTO: 6 % (ref 4–12)
NEUTROPHILS # BLD AUTO: 4.71 THOUSANDS/ÂΜL (ref 1.85–7.62)
NEUTS SEG NFR BLD AUTO: 55 % (ref 43–75)
NRBC BLD AUTO-RTO: 0 /100 WBCS
PLATELET # BLD AUTO: 298 THOUSANDS/UL (ref 149–390)
PMV BLD AUTO: 10.3 FL (ref 8.9–12.7)
POTASSIUM SERPL-SCNC: 4.3 MMOL/L (ref 3.5–5.3)
RBC # BLD AUTO: 4.22 MILLION/UL (ref 3.81–5.12)
SODIUM SERPL-SCNC: 135 MMOL/L (ref 135–147)
WBC # BLD AUTO: 8.56 THOUSAND/UL (ref 4.31–10.16)

## 2022-10-19 PROCEDURE — 80048 BASIC METABOLIC PNL TOTAL CA: CPT | Performed by: ORTHOPAEDIC SURGERY

## 2022-10-19 PROCEDURE — 99222 1ST HOSP IP/OBS MODERATE 55: CPT | Performed by: ORTHOPAEDIC SURGERY

## 2022-10-19 PROCEDURE — 99213 OFFICE O/P EST LOW 20 MIN: CPT | Performed by: EMERGENCY MEDICINE

## 2022-10-19 PROCEDURE — 85652 RBC SED RATE AUTOMATED: CPT | Performed by: ORTHOPAEDIC SURGERY

## 2022-10-19 PROCEDURE — 85025 COMPLETE CBC W/AUTO DIFF WBC: CPT | Performed by: ORTHOPAEDIC SURGERY

## 2022-10-19 PROCEDURE — 87070 CULTURE OTHR SPECIMN AEROBIC: CPT | Performed by: ORTHOPAEDIC SURGERY

## 2022-10-19 PROCEDURE — 11043 DBRDMT MUSC&/FSCA 1ST 20/<: CPT | Performed by: ORTHOPAEDIC SURGERY

## 2022-10-19 PROCEDURE — 86140 C-REACTIVE PROTEIN: CPT | Performed by: ORTHOPAEDIC SURGERY

## 2022-10-19 PROCEDURE — 87076 CULTURE ANAEROBE IDENT EACH: CPT | Performed by: ORTHOPAEDIC SURGERY

## 2022-10-19 PROCEDURE — 0JBK0ZZ EXCISION OF LEFT HAND SUBCUTANEOUS TISSUE AND FASCIA, OPEN APPROACH: ICD-10-PCS | Performed by: ORTHOPAEDIC SURGERY

## 2022-10-19 PROCEDURE — 87075 CULTR BACTERIA EXCEPT BLOOD: CPT | Performed by: ORTHOPAEDIC SURGERY

## 2022-10-19 PROCEDURE — 99214 OFFICE O/P EST MOD 30 MIN: CPT | Performed by: PHYSICIAN ASSISTANT

## 2022-10-19 PROCEDURE — 99214 OFFICE O/P EST MOD 30 MIN: CPT | Performed by: ORTHOPAEDIC SURGERY

## 2022-10-19 PROCEDURE — 87077 CULTURE AEROBIC IDENTIFY: CPT | Performed by: ORTHOPAEDIC SURGERY

## 2022-10-19 PROCEDURE — 87205 SMEAR GRAM STAIN: CPT | Performed by: ORTHOPAEDIC SURGERY

## 2022-10-19 PROCEDURE — 87186 SC STD MICRODIL/AGAR DIL: CPT | Performed by: ORTHOPAEDIC SURGERY

## 2022-10-19 RX ORDER — MAGNESIUM HYDROXIDE 1200 MG/15ML
LIQUID ORAL AS NEEDED
Status: DISCONTINUED | OUTPATIENT
Start: 2022-10-19 | End: 2022-10-19 | Stop reason: HOSPADM

## 2022-10-19 RX ORDER — OXYCODONE HYDROCHLORIDE 10 MG/1
10 TABLET ORAL EVERY 4 HOURS PRN
Status: DISCONTINUED | OUTPATIENT
Start: 2022-10-19 | End: 2022-10-21

## 2022-10-19 RX ORDER — IBUPROFEN 600 MG/1
600 TABLET ORAL EVERY 6 HOURS PRN
COMMUNITY
Start: 2022-10-14

## 2022-10-19 RX ORDER — CALCIUM CARBONATE 200(500)MG
1000 TABLET,CHEWABLE ORAL DAILY PRN
Status: DISCONTINUED | OUTPATIENT
Start: 2022-10-19 | End: 2022-10-24 | Stop reason: HOSPADM

## 2022-10-19 RX ORDER — DIPHENHYDRAMINE HYDROCHLORIDE 50 MG/ML
12.5 INJECTION INTRAMUSCULAR; INTRAVENOUS ONCE AS NEEDED
Status: DISCONTINUED | OUTPATIENT
Start: 2022-10-19 | End: 2022-10-19 | Stop reason: HOSPADM

## 2022-10-19 RX ORDER — DOCUSATE SODIUM 100 MG/1
100 CAPSULE, LIQUID FILLED ORAL 2 TIMES DAILY
Status: DISCONTINUED | OUTPATIENT
Start: 2022-10-19 | End: 2022-10-24 | Stop reason: HOSPADM

## 2022-10-19 RX ORDER — CEFAZOLIN SODIUM 2 G/50ML
SOLUTION INTRAVENOUS AS NEEDED
Status: DISCONTINUED | OUTPATIENT
Start: 2022-10-19 | End: 2022-10-19

## 2022-10-19 RX ORDER — ZOLPIDEM TARTRATE 5 MG/1
5 TABLET ORAL
Refills: 5 | Status: DISCONTINUED | OUTPATIENT
Start: 2022-10-19 | End: 2022-10-24 | Stop reason: HOSPADM

## 2022-10-19 RX ORDER — HYDROMORPHONE HCL/PF 1 MG/ML
SYRINGE (ML) INJECTION AS NEEDED
Status: DISCONTINUED | OUTPATIENT
Start: 2022-10-19 | End: 2022-10-19

## 2022-10-19 RX ORDER — VANCOMYCIN HYDROCHLORIDE 1 G/200ML
1000 INJECTION, SOLUTION INTRAVENOUS EVERY 12 HOURS
Status: DISCONTINUED | OUTPATIENT
Start: 2022-10-19 | End: 2022-10-21

## 2022-10-19 RX ORDER — ACETAMINOPHEN 325 MG/1
975 TABLET ORAL EVERY 8 HOURS
Status: DISCONTINUED | OUTPATIENT
Start: 2022-10-19 | End: 2022-10-24 | Stop reason: HOSPADM

## 2022-10-19 RX ORDER — FENTANYL CITRATE 50 UG/ML
INJECTION, SOLUTION INTRAMUSCULAR; INTRAVENOUS
Status: COMPLETED
Start: 2022-10-19 | End: 2022-10-19

## 2022-10-19 RX ORDER — ONDANSETRON 2 MG/ML
INJECTION INTRAMUSCULAR; INTRAVENOUS AS NEEDED
Status: DISCONTINUED | OUTPATIENT
Start: 2022-10-19 | End: 2022-10-19

## 2022-10-19 RX ORDER — CEFAZOLIN SODIUM 2 G/50ML
SOLUTION INTRAVENOUS
Status: COMPLETED
Start: 2022-10-19 | End: 2022-10-19

## 2022-10-19 RX ORDER — PROPOFOL 10 MG/ML
INJECTION, EMULSION INTRAVENOUS AS NEEDED
Status: DISCONTINUED | OUTPATIENT
Start: 2022-10-19 | End: 2022-10-19

## 2022-10-19 RX ORDER — MAGNESIUM HYDROXIDE/ALUMINUM HYDROXICE/SIMETHICONE 120; 1200; 1200 MG/30ML; MG/30ML; MG/30ML
30 SUSPENSION ORAL EVERY 6 HOURS PRN
Status: DISCONTINUED | OUTPATIENT
Start: 2022-10-19 | End: 2022-10-24 | Stop reason: HOSPADM

## 2022-10-19 RX ORDER — LIDOCAINE HYDROCHLORIDE 10 MG/ML
INJECTION, SOLUTION EPIDURAL; INFILTRATION; INTRACAUDAL; PERINEURAL AS NEEDED
Status: DISCONTINUED | OUTPATIENT
Start: 2022-10-19 | End: 2022-10-19

## 2022-10-19 RX ORDER — FENTANYL CITRATE/PF 50 MCG/ML
50 SYRINGE (ML) INJECTION
Status: COMPLETED | OUTPATIENT
Start: 2022-10-19 | End: 2022-10-19

## 2022-10-19 RX ORDER — DOXYCYCLINE HYCLATE 100 MG
100 TABLET ORAL 2 TIMES DAILY
COMMUNITY
Start: 2022-10-17 | End: 2022-10-24

## 2022-10-19 RX ORDER — OXYCODONE HYDROCHLORIDE 5 MG/1
5 TABLET ORAL EVERY 4 HOURS PRN
Status: DISCONTINUED | OUTPATIENT
Start: 2022-10-19 | End: 2022-10-24 | Stop reason: HOSPADM

## 2022-10-19 RX ORDER — ONDANSETRON 2 MG/ML
4 INJECTION INTRAMUSCULAR; INTRAVENOUS ONCE AS NEEDED
Status: DISCONTINUED | OUTPATIENT
Start: 2022-10-19 | End: 2022-10-19 | Stop reason: HOSPADM

## 2022-10-19 RX ORDER — AMOXICILLIN AND CLAVULANATE POTASSIUM 875; 125 MG/1; MG/1
TABLET, FILM COATED ORAL
Status: ON HOLD | COMMUNITY
Start: 2022-10-14 | End: 2022-10-20 | Stop reason: ALTCHOICE

## 2022-10-19 RX ORDER — ROPIVACAINE HYDROCHLORIDE 5 MG/ML
INJECTION, SOLUTION EPIDURAL; INFILTRATION; PERINEURAL
Status: DISCONTINUED | OUTPATIENT
Start: 2022-10-19 | End: 2022-10-19

## 2022-10-19 RX ORDER — DEXAMETHASONE SODIUM PHOSPHATE 10 MG/ML
INJECTION, SOLUTION INTRAMUSCULAR; INTRAVENOUS AS NEEDED
Status: DISCONTINUED | OUTPATIENT
Start: 2022-10-19 | End: 2022-10-19

## 2022-10-19 RX ORDER — ONDANSETRON 2 MG/ML
4 INJECTION INTRAMUSCULAR; INTRAVENOUS EVERY 6 HOURS PRN
Status: DISCONTINUED | OUTPATIENT
Start: 2022-10-19 | End: 2022-10-24 | Stop reason: HOSPADM

## 2022-10-19 RX ORDER — ESCITALOPRAM OXALATE 10 MG/1
20 TABLET ORAL DAILY
Status: DISCONTINUED | OUTPATIENT
Start: 2022-10-20 | End: 2022-10-24 | Stop reason: HOSPADM

## 2022-10-19 RX ORDER — HYDROMORPHONE HCL/PF 1 MG/ML
0.5 SYRINGE (ML) INJECTION
Status: DISCONTINUED | OUTPATIENT
Start: 2022-10-19 | End: 2022-10-19 | Stop reason: HOSPADM

## 2022-10-19 RX ORDER — FENTANYL CITRATE 50 UG/ML
INJECTION, SOLUTION INTRAMUSCULAR; INTRAVENOUS AS NEEDED
Status: DISCONTINUED | OUTPATIENT
Start: 2022-10-19 | End: 2022-10-19

## 2022-10-19 RX ORDER — ALBUTEROL SULFATE 90 UG/1
2 AEROSOL, METERED RESPIRATORY (INHALATION) EVERY 4 HOURS PRN
Status: DISCONTINUED | OUTPATIENT
Start: 2022-10-19 | End: 2022-10-24 | Stop reason: HOSPADM

## 2022-10-19 RX ORDER — SODIUM CHLORIDE, SODIUM LACTATE, POTASSIUM CHLORIDE, CALCIUM CHLORIDE 600; 310; 30; 20 MG/100ML; MG/100ML; MG/100ML; MG/100ML
INJECTION, SOLUTION INTRAVENOUS CONTINUOUS PRN
Status: DISCONTINUED | OUTPATIENT
Start: 2022-10-19 | End: 2022-10-19

## 2022-10-19 RX ADMIN — HYDROMORPHONE HYDROCHLORIDE 0.5 MG: 1 INJECTION, SOLUTION INTRAMUSCULAR; INTRAVENOUS; SUBCUTANEOUS at 18:05

## 2022-10-19 RX ADMIN — PIPERACILLIN AND TAZOBACTAM 3.38 G: 3; .375 INJECTION, POWDER, FOR SOLUTION INTRAVENOUS at 20:00

## 2022-10-19 RX ADMIN — PROPOFOL 200 MG: 10 INJECTION, EMULSION INTRAVENOUS at 18:00

## 2022-10-19 RX ADMIN — FENTANYL CITRATE 50 MCG: 50 INJECTION, SOLUTION INTRAMUSCULAR; INTRAVENOUS at 18:46

## 2022-10-19 RX ADMIN — LIDOCAINE HYDROCHLORIDE 50 MG: 10 INJECTION, SOLUTION EPIDURAL; INFILTRATION; INTRACAUDAL; PERINEURAL at 17:59

## 2022-10-19 RX ADMIN — HYDROMORPHONE HYDROCHLORIDE 0.5 MG: 1 INJECTION, SOLUTION INTRAMUSCULAR; INTRAVENOUS; SUBCUTANEOUS at 18:57

## 2022-10-19 RX ADMIN — VANCOMYCIN HYDROCHLORIDE 1000 MG: 1 INJECTION, SOLUTION INTRAVENOUS at 20:42

## 2022-10-19 RX ADMIN — FENTANYL CITRATE 50 MCG: 50 INJECTION, SOLUTION INTRAMUSCULAR; INTRAVENOUS at 18:38

## 2022-10-19 RX ADMIN — ACETAMINOPHEN 975 MG: 325 TABLET ORAL at 20:00

## 2022-10-19 RX ADMIN — DEXAMETHASONE SODIUM PHOSPHATE 10 MG: 10 INJECTION, SOLUTION INTRAMUSCULAR; INTRAVENOUS at 18:00

## 2022-10-19 RX ADMIN — CEFAZOLIN SODIUM 2000 MG: 2 SOLUTION INTRAVENOUS at 18:23

## 2022-10-19 RX ADMIN — FENTANYL CITRATE 50 MCG: 50 INJECTION, SOLUTION INTRAMUSCULAR; INTRAVENOUS at 17:52

## 2022-10-19 RX ADMIN — FENTANYL CITRATE 50 MCG: 50 INJECTION, SOLUTION INTRAMUSCULAR; INTRAVENOUS at 18:57

## 2022-10-19 RX ADMIN — FENTANYL CITRATE 50 MCG: 50 INJECTION, SOLUTION INTRAMUSCULAR; INTRAVENOUS at 18:03

## 2022-10-19 RX ADMIN — ROPIVACAINE HYDROCHLORIDE 25 ML: 5 INJECTION EPIDURAL; INFILTRATION; PERINEURAL at 19:05

## 2022-10-19 RX ADMIN — ONDANSETRON 4 MG: 2 INJECTION INTRAMUSCULAR; INTRAVENOUS at 17:58

## 2022-10-19 RX ADMIN — FENTANYL CITRATE 50 MCG: 50 INJECTION, SOLUTION INTRAMUSCULAR; INTRAVENOUS at 18:27

## 2022-10-19 RX ADMIN — HYDROMORPHONE HYDROCHLORIDE 0.5 MG: 1 INJECTION, SOLUTION INTRAMUSCULAR; INTRAVENOUS; SUBCUTANEOUS at 18:35

## 2022-10-19 RX ADMIN — SODIUM CHLORIDE, SODIUM LACTATE, POTASSIUM CHLORIDE, AND CALCIUM CHLORIDE: .6; .31; .03; .02 INJECTION, SOLUTION INTRAVENOUS at 17:40

## 2022-10-19 RX ADMIN — DOCUSATE SODIUM 100 MG: 100 CAPSULE, LIQUID FILLED ORAL at 20:00

## 2022-10-19 NOTE — TELEPHONE ENCOUNTER
Pt called she had an injury last Friday at work  Went to urgent care on [address] got stitches in her left hand  The next day it was green yellow went back  Was giving new meds  Pt states she is not happy with the care from the urgent care  They told her at first the stitches come out in 14 days now they want them out Friday  She states its is clearly not healed  pt will upload an image on her mychart and would like to come see you today  She only trusts you for her care

## 2022-10-19 NOTE — ANESTHESIA POSTPROCEDURE EVALUATION
Post-Op Assessment Note    CV Status:  Stable  Pain Score: 7    Pain management: adequate     Mental Status:  Alert and awake   Hydration Status:  Euvolemic   PONV Controlled:  Controlled   Airway Patency:  Patent      Post Op Vitals Reviewed: Yes      Staff: CRNA   Comments: will follow up with discomfort        No complications documented      /91   Temp 98   Pulse 80   Resp 20   SpO2 99

## 2022-10-19 NOTE — H&P
Orthopedic Sports Medicine New Patient Visit     Assesment:   56 y o  female with left hand infection     Plan:    The patient had a head injury 5 days ago that was sutured  She began having signs of infection 2 days ago and was placed on antibiotics  She presents today with pale and drainage from the wound, extensive cellulitis, and fever and chills  I recommend that we proceed to the operating room later this evening to perform an incision and debridement of the wound  She will require admission and IV antibiotics with Infectious Disease consult to determine optimal antibiotic course when cultures are available  She may require more than 1 debridement in the operating room depending on the appearance of the wound during the 1st trip to the OR and her response to antibiotics  She will be remain NPO at this time and a direct admit to the hospital for surgical intervention with me later this evening  Chief Complaint   Patient presents with   • Left Hand - Pain, Swelling       History of Present Illness:    Presents the hospital now for surgery of her hand    From clinic note: The patient is a 56 y o  female with 5 days out from a hand wound at work  She was sutured in the ED that day  Three days later she developed signs of infection was placed on antibiotics by mouth  She has continued to worsen then presents with erythema and drainage from the wound as well as fever and chills  Pain is all present at the thenar eminence  She denies any numbness tingling  Able to make a full composite fist     She sustained the injury at her job at a [employer]  The area where she fell does have rotten meat and insect that likely contaminated the wound      Past Medical, Social and Family History:  Past Medical History:   Diagnosis Date   • ADD (attention deficit disorder) 2019   • Anxiety 5/31/2013   • Arthritis    • Herpes simplex     EXTERNAL   • IBS (irritable bowel syndrome)    • Insomnia    • Jaundice • Malignant melanoma in situ ([address] )    • Pneumonia    • PONV (postoperative nausea and vomiting)    • Reactive airway disease      Past Surgical History:   Procedure Laterality Date   • AUGMENTATION BREAST     • AUGMENTATION MAMMAPLASTY Bilateral 28years ago   • CT KNEE SCOPE,MED/LAT MENISECTOMY Right 4/6/2021    Procedure: KNEE ARTHROSCOPY, medial lateral menisectomy,chondroplasty,synovectomy,inj;  Surgeon: Ratna Costa DO;  Location: [address] [place], [place] [address] MAIN OR;  Service: Orthopedics   • SKIN BIOPSY  2013    2 PUNCH BIOPSIES   • SKIN LESION EXCISION Right 2013    LEG   • TUBAL LIGATION       Allergies   Allergen Reactions   • Bacitracin Rash   • Neomycin Rash   • Polymyxin B Rash   • Vilazodone Other (See Comments)     Other reaction(s): abdomen pain     Current Outpatient Medications on File Prior to Visit   Medication Sig Dispense Refill   • albuterol (PROVENTIL HFA,VENTOLIN HFA) 90 mcg/act inhaler INHALE 1 PUFF BY MOUTH EVERY 6 HOURS AS NEEDED FOR WHEEZE 6 7 Inhaler 5   • doxycycline hyclate (VIBRA-TABS) 100 mg tablet      • escitalopram (LEXAPRO) 20 mg tablet Take 1 tablet (20 mg total) by mouth daily 90 tablet 1   • ibuprofen (MOTRIN) 600 mg tablet      • lisdexamfetamine (VYVANSE) 30 MG capsule Take 1 capsule (30 mg total) by mouth every morning Max Daily Amount: 30 mg 90 capsule 0   • lisdexamfetamine (VYVANSE) 50 MG capsule Take 1 capsule (50 mg total) by mouth every morning Max Daily Amount: 50 mg 90 capsule 0   • PreviDent 5000 Sensitive 1 1-5 % GEL BRUSH 1 TIME AT NIGHT FOR ONE MIN NOTHING MY MOUTH FOR 30 MIN     • zolpidem (AMBIEN CR) 6 25 MG CR tablet Take 1 tablet (6 25 mg total) by mouth daily at bedtime as needed for sleep 30 tablet 5   • amoxicillin-clavulanate (AUGMENTIN) 875-125 mg per tablet  (Patient not taking: Reported on 10/19/2022)     • bimatoprost (LATISSE) 0 03 % ophthalmic solution PLACE 2 DROPS BOTH EYES AT BEDTIME APPLY EVENLY ALONG WITH SKIN ON THE UPPER EYELID AT BASE OF EYELASHES ONCE DAILY AT BEDTIME REPEAT FOR SECOND EYE (Patient not taking: Reported on 10/19/2022) 5 mL 6   • valACYclovir (VALTREX) 1,000 mg tablet Take 1 tablet (1,000 mg total) by mouth 3 (three) times a day for 7 days 21 tablet 3     No current facility-administered medications on file prior to visit  Social History     Socioeconomic History   • Marital status: /Civil Union     Spouse name: Not on file   • Number of children: Not on file   • Years of education: Not on file   • Highest education level: Not on file   Occupational History   • Occupation: EMPLOYED   • Occupation: [employer]   Tobacco Use   • Smoking status: Current Every Day Smoker     Packs/day: 1 00     Years: 42 00     Pack years: 42 00     Types: Cigarettes   • Smokeless tobacco: Never Used   Vaping Use   • Vaping Use: Never used   Substance and Sexual Activity   • Alcohol use: Yes     Comment: social   • Drug use: No   • Sexual activity: Yes     Partners: Male   Other Topics Concern   • Not on file   Social History Narrative    EMPLOYED     Social Determinants of Health     Financial Resource Strain: Not on file   Food Insecurity: Not on file   Transportation Needs: Not on file   Physical Activity: Not on file   Stress: Not on file   Social Connections: Not on file   Intimate Partner Violence: Not on file   Housing Stability: Not on file         I have reviewed the past medical, surgical, social and family history, medications and allergies as documented in the EMR  Review of systems: ROS is negative other than that noted in the HPI  Constitutional: Negative for fatigue and fever  HENT: Negative for sore throat  Respiratory: Negative for shortness of breath  Cardiovascular: Negative for chest pain  Gastrointestinal: Negative for abdominal pain  Endocrine: Negative for cold intolerance and heat intolerance  Genitourinary: Negative for flank pain  Musculoskeletal: Negative for back pain  Skin: Negative for rash     Allergic/Immunologic: Negative for immunocompromised state  Neurological: Negative for dizziness  Psychiatric/Behavioral: Negative for agitation  Physical Exam:    Height 5' 6 5" (1 689 m), weight 64 9 kg (143 lb), not currently breastfeeding  General/Constitutional: NAD, well developed, well nourished  HENT: Normocephalic, atraumatic  CV: Intact distal pulses, regular rate  Resp: No respiratory distress or labored breathing  Lymphatic: No lymphadenopathy palpated  Neuro: Alert and Oriented x 3, no focal deficits  Psych: Normal mood, normal affect  Skin: Warm, dry, no rashes, no erythema      Left UE ORTHO EXAM  Wound over the thenar eminence of the left hand  Sutures in place  There is surrounding erythema as well as purulent drainage  Tender to palpation about the wound  + A-OK, finger cross, thumb retropulsion   SILT median/radial/ulnar nerve distribution   2+ radial pulse   BCR all fingers         Imaging    I reviewed and interpreted X-rays taken in clinic today which show no fractures  There does appear to be some foreign material present in the soft tissues of the thenar eminence

## 2022-10-19 NOTE — ANESTHESIA PREPROCEDURE EVALUATION
Procedure:  DEBRIDEMENT WOUND AND DRESSING CHANGE ([address]) (Left Hand)    Relevant Problems   CARDIO   (+) Other hyperlipidemia      NEURO/PSYCH   (+) Anxiety   (+) Depression, recurrent (HCC)   (+) History of COVID-19   (+) Major depression, single episode   (+) Numbness and tingling      PULMONARY   (+) Current smoker      Other   (+) Attention deficit hyperactivity disorder (ADHD), combined type        Physical Exam    Airway    Mallampati score: I  TM Distance: >3 FB  Neck ROM: full     Dental       Cardiovascular      Pulmonary      Other Findings        Anesthesia Plan  ASA Score- 2     Anesthesia Type- general with ASA Monitors  Additional Monitors:   Airway Plan: LMA  Plan Factors-Exercise tolerance (METS): >4 METS  Chart reviewed  Existing labs reviewed  Patient summary reviewed  Patient is a current smoker  Patient not instructed to abstain from smoking on day of procedure  Patient smoked on day of surgery  Induction- intravenous  Postoperative Plan- Plan for postoperative opioid use  Planned trial extubation    Informed Consent- Anesthetic plan and risks discussed with patient  I personally reviewed this patient with the CRNA  Discussed and agreed on the Anesthesia Plan with the CRNA  Gerson Jackson

## 2022-10-19 NOTE — H&P
Orthopedic Sports Medicine New Patient Visit     Assesment:   56 y o  female with left hand infection     Plan:    The patient had a head injury 5 days ago that was sutured  She began having signs of infection 2 days ago and was placed on antibiotics  She presents today with pale and drainage from the wound, extensive cellulitis, and fever and chills  I recommend that we proceed to the operating room later this evening to perform an incision and debridement of the wound  She will require admission and IV antibiotics with Infectious Disease consult to determine optimal antibiotic course when cultures are available  She may require more than 1 debridement in the operating room depending on the appearance of the wound during the 1st trip to the OR and her response to antibiotics  She will be remain NPO at this time and a direct admit to the hospital for surgical intervention with me later this evening  No chief complaint on file  History of Present Illness:    Presents the hospital now for surgery of her hand    From clinic note: The patient is a 56 y o  female with 5 days out from a hand wound at work  She was sutured in the ED that day  Three days later she developed signs of infection was placed on antibiotics by mouth  She has continued to worsen then presents with erythema and drainage from the wound as well as fever and chills  Pain is all present at the thenar eminence  She denies any numbness tingling  Able to make a full composite fist     She sustained the injury at her job at a [employer]  The area where she fell does have rotten meat and insect that likely contaminated the wound      Past Medical, Social and Family History:  Past Medical History:   Diagnosis Date   • ADD (attention deficit disorder) 2019   • Anxiety 05/31/2013   • Herpes simplex     EXTERNAL   • IBS (irritable bowel syndrome)    • Insomnia    • Jaundice    • Malignant melanoma in situ (HCC)    • Pneumonia    • PONV (postoperative nausea and vomiting)    • Reactive airway disease      Past Surgical History:   Procedure Laterality Date   • AUGMENTATION BREAST     • AUGMENTATION MAMMAPLASTY Bilateral 28years ago   • PA KNEE SCOPE,MED/LAT MENISECTOMY Right 04/06/2021    Procedure: KNEE ARTHROSCOPY, medial lateral menisectomy,chondroplasty,synovectomy,inj;  Surgeon: Vee Smith DO;  Location: [facility] MAIN OR;  Service: Orthopedics   • SKIN BIOPSY  2013    2 PUNCH BIOPSIES   • SKIN LESION EXCISION Right 2013    LEG   • TONSILLECTOMY     • TUBAL LIGATION       Allergies   Allergen Reactions   • Bacitracin Rash   • Neomycin Rash   • Polymyxin B Rash   • Vilazodone Other (See Comments)     Other reaction(s): abdomen pain     No current facility-administered medications on file prior to encounter       Current Outpatient Medications on File Prior to Encounter   Medication Sig Dispense Refill   • doxycycline hyclate (VIBRA-TABS) 100 mg tablet Take 100 mg by mouth 2 (two) times a day Take for ten days     • escitalopram (LEXAPRO) 20 mg tablet Take 1 tablet (20 mg total) by mouth daily 90 tablet 1   • ibuprofen (MOTRIN) 600 mg tablet Take 600 mg by mouth every 6 (six) hours as needed     • lisdexamfetamine (VYVANSE) 50 MG capsule Take 1 capsule (50 mg total) by mouth every morning Max Daily Amount: 50 mg 90 capsule 0   • PreviDent 5000 Sensitive 1 1-5 % GEL BRUSH 1 TIME AT NIGHT FOR ONE MIN NOTHING MY MOUTH FOR 30 MIN     • albuterol (PROVENTIL HFA,VENTOLIN HFA) 90 mcg/act inhaler INHALE 1 PUFF BY MOUTH EVERY 6 HOURS AS NEEDED FOR WHEEZE 6 7 Inhaler 5   • amoxicillin-clavulanate (AUGMENTIN) 875-125 mg per tablet  (Patient not taking: No sig reported)     • bimatoprost (LATISSE) 0 03 % ophthalmic solution PLACE 2 DROPS BOTH EYES AT BEDTIME APPLY EVENLY ALONG WITH SKIN ON THE UPPER EYELID AT BASE OF EYELASHES ONCE DAILY AT BEDTIME REPEAT FOR SECOND EYE (Patient not taking: No sig reported) 5 mL 6   • lisdexamfetamine (VYVANSE) 30 MG capsule Take 1 capsule (30 mg total) by mouth every morning Max Daily Amount: 30 mg 90 capsule 0   • valACYclovir (VALTREX) 1,000 mg tablet Take 1 tablet (1,000 mg total) by mouth 3 (three) times a day for 7 days 21 tablet 3   • zolpidem (AMBIEN CR) 6 25 MG CR tablet Take 1 tablet (6 25 mg total) by mouth daily at bedtime as needed for sleep 30 tablet 5     Social History     Socioeconomic History   • Marital status: /Civil Union     Spouse name: Not on file   • Number of children: Not on file   • Years of education: Not on file   • Highest education level: Not on file   Occupational History   • Occupation: EMPLOYED   • Occupation: [employer]   Tobacco Use   • Smoking status: Current Every Day Smoker     Packs/day: 1 00     Years: 42 00     Pack years: 42 00     Types: Cigarettes   • Smokeless tobacco: Never Used   Vaping Use   • Vaping Use: Never used   Substance and Sexual Activity   • Alcohol use: Never   • Drug use: Never   • Sexual activity: Yes     Partners: Male   Other Topics Concern   • Not on file   Social History Narrative    EMPLOYED     Social Determinants of Health     Financial Resource Strain: Not on file   Food Insecurity: Not on file   Transportation Needs: Not on file   Physical Activity: Not on file   Stress: Not on file   Social Connections: Not on file   Intimate Partner Violence: Not on file   Housing Stability: Not on file         I have reviewed the past medical, surgical, social and family history, medications and allergies as documented in the EMR  Review of systems: ROS is negative other than that noted in the HPI  Constitutional: Negative for fatigue and fever  HENT: Negative for sore throat  Respiratory: Negative for shortness of breath  Cardiovascular: Negative for chest pain  Gastrointestinal: Negative for abdominal pain  Endocrine: Negative for cold intolerance and heat intolerance  Genitourinary: Negative for flank pain  Musculoskeletal: Negative for back pain  Skin: Negative for rash  Allergic/Immunologic: Negative for immunocompromised state  Neurological: Negative for dizziness  Psychiatric/Behavioral: Negative for agitation  Physical Exam:    Blood pressure 149/90, pulse 73, temperature 98 4 °F (36 9 °C), last menstrual period 02/01/2021, SpO2 97 %, not currently breastfeeding  General/Constitutional: NAD, well developed, well nourished  HENT: Normocephalic, atraumatic  CV: Intact distal pulses, regular rate  Resp: No respiratory distress or labored breathing  Lymphatic: No lymphadenopathy palpated  Neuro: Alert and Oriented x 3, no focal deficits  Psych: Normal mood, normal affect  Skin: Warm, dry, no rashes, no erythema      Left UE ORTHO EXAM  Wound over the thenar eminence of the left hand  Sutures in place  There is surrounding erythema as well as purulent drainage  Tender to palpation about the wound  + A-OK, finger cross, thumb retropulsion   SILT median/radial/ulnar nerve distribution   2+ radial pulse   BCR all fingers         Imaging    I reviewed and interpreted X-rays taken in clinic today which show no fractures  There does appear to be some foreign material present in the soft tissues of the thenar eminence

## 2022-10-19 NOTE — PROGRESS NOTES
Name: Karen Crocker      : 1965      MRN: [id number]  Encounter Provider: Kane Reed PA-C  Encounter Date: 10/19/2022   Encounter department: [address] [place] [address]  Cellulitis of left hand  Assessment & Plan:  Continue doxycycline  Will refer to ortho surgery as pt may need this opened up  May need wound care  Wound wrapped with dressing  Xray negative  Tetanus up to date  Subjective      Pt presents for same day evaluation of an injury that occurred at work on 10/14  She works at [employer] and fell into a dumpster that contained spoiled meat and maggots  She notes this tore her skin along the thenar eminence of her left hand  She was evaluated at urgent care and given 5 sutures and started on augmentin  It looked worse to her throughout the weekend with purulent discharge so she went back to urgent care on 10/17  Antibiotic was changed to doxycyline  Xray negative except for possible foreign body  Tetanus is up to date  Today the wound is acutely tender  The tenderness is out of proportion to the injury  Her entire hand is also swollen which is atypical for an injury like this  She notes pain with bending her fingers  Sutures are in place and some minimal purulent discharge can be expressed but it is too painful for patient  Review of Systems   Constitutional: Negative for chills and fever  HENT: Negative for congestion, ear pain, hearing loss, postnasal drip, rhinorrhea, sinus pressure, sinus pain, sore throat and trouble swallowing  Eyes: Negative for pain and visual disturbance  Respiratory: Negative for cough, chest tightness, shortness of breath and wheezing  Cardiovascular: Negative  Negative for chest pain, palpitations and leg swelling  Gastrointestinal: Negative for abdominal pain, blood in stool, constipation, diarrhea, nausea and vomiting     Endocrine: Negative for cold intolerance, heat intolerance, polydipsia, polyphagia and polyuria  Genitourinary: Negative for difficulty urinating, dysuria, flank pain and urgency  Musculoskeletal: Negative for arthralgias, back pain, gait problem and myalgias  Skin: Positive for wound  Negative for rash  Allergic/Immunologic: Negative  Neurological: Negative for dizziness, weakness, light-headedness and headaches  Hematological: Negative  Psychiatric/Behavioral: Negative for behavioral problems, dysphoric mood and sleep disturbance  The patient is not nervous/anxious          Current Outpatient Medications on File Prior to Visit   Medication Sig   • albuterol (PROVENTIL HFA,VENTOLIN HFA) 90 mcg/act inhaler INHALE 1 PUFF BY MOUTH EVERY 6 HOURS AS NEEDED FOR WHEEZE   • doxycycline hyclate (VIBRA-TABS) 100 mg tablet    • escitalopram (LEXAPRO) 20 mg tablet Take 1 tablet (20 mg total) by mouth daily   • ibuprofen (MOTRIN) 600 mg tablet    • lisdexamfetamine (VYVANSE) 30 MG capsule Take 1 capsule (30 mg total) by mouth every morning Max Daily Amount: 30 mg   • lisdexamfetamine (VYVANSE) 50 MG capsule Take 1 capsule (50 mg total) by mouth every morning Max Daily Amount: 50 mg   • PreviDent 5000 Sensitive 1 1-5 % GEL BRUSH 1 TIME AT NIGHT FOR ONE MIN NOTHING MY MOUTH FOR 30 MIN   • zolpidem (AMBIEN CR) 6 25 MG CR tablet Take 1 tablet (6 25 mg total) by mouth daily at bedtime as needed for sleep   • amoxicillin-clavulanate (AUGMENTIN) 875-125 mg per tablet  (Patient not taking: Reported on 10/19/2022)   • bimatoprost (LATISSE) 0 03 % ophthalmic solution PLACE 2 DROPS BOTH EYES AT BEDTIME APPLY EVENLY ALONG WITH SKIN ON THE UPPER EYELID AT BASE OF EYELASHES ONCE DAILY AT BEDTIME REPEAT FOR SECOND EYE (Patient not taking: Reported on 10/19/2022)   • valACYclovir (VALTREX) 1,000 mg tablet Take 1 tablet (1,000 mg total) by mouth 3 (three) times a day for 7 days       Objective     /80 (BP Location: Left arm, Patient Position: Sitting)   Pulse 96   Temp 98 6 °F (37 °C)   Ht 5' 6 5" (1 689 m)   Wt 65 kg (143 lb 4 oz)   SpO2 97%   BMI 22 77 kg/m²     Physical Exam  Constitutional:       General: She is not in acute distress  Appearance: She is well-developed  She is not diaphoretic  HENT:      Head: Normocephalic and atraumatic  Right Ear: External ear normal       Left Ear: External ear normal       Nose: Nose normal       Mouth/Throat:      Pharynx: No oropharyngeal exudate  Eyes:      General: No scleral icterus  Right eye: No discharge  Left eye: No discharge  Conjunctiva/sclera: Conjunctivae normal       Pupils: Pupils are equal, round, and reactive to light  Neck:      Thyroid: No thyromegaly  Cardiovascular:      Rate and Rhythm: Normal rate and regular rhythm  Heart sounds: Normal heart sounds  No murmur heard  No friction rub  No gallop  Pulmonary:      Effort: Pulmonary effort is normal  No respiratory distress  Breath sounds: Normal breath sounds  No wheezing or rales  Abdominal:      General: Bowel sounds are normal  There is no distension  Palpations: Abdomen is soft  Tenderness: There is no abdominal tenderness  Musculoskeletal:         General: No deformity  Left hand: Swelling, laceration and tenderness present  Decreased range of motion  Cervical back: Normal range of motion and neck supple  Skin:     General: Skin is warm and dry  Neurological:      Mental Status: She is alert and oriented to person, place, and time  Cranial Nerves: No cranial nerve deficit  Psychiatric:         Behavior: Behavior normal          Thought Content:  Thought content normal          Judgment: Judgment normal        Janine Nix PA-C

## 2022-10-19 NOTE — ASSESSMENT & PLAN NOTE
Continue doxycycline  Will refer to ortho surgery as pt may need this opened up  May need wound care  Wound wrapped with dressing  Xray negative  Tetanus up to date

## 2022-10-19 NOTE — PROGRESS NOTES
Orthopedic Sports Medicine New Patient Visit     Assesment:   56 y.o. female with left hand infection     Plan:    The patient had a head injury 5 days ago that was sutured. She began having signs of infection 2 days ago and was placed on antibiotics. She presents today with pale and drainage from the wound, extensive cellulitis, and fever and chills. I recommend that we proceed to the operating room later this evening to perform an incision and debridement of the wound. She will require admission and IV antibiotics with Infectious Disease consult to determine optimal antibiotic course when cultures are available. She may require more than 1 debridement in the operating room depending on the appearance of the wound during the 1st trip to the OR and her response to antibiotics. She will be remain NPO at this time and a direct admit to the hospital for surgical intervention with me later this evening. No chief complaint on file. History of Present Illness:    Presents the hospital now for surgery of her hand    From clinic note: The patient is a 56 y.o. female with 5 days out from a hand wound at work. She was sutured in the ED that day. Three days later she developed signs of infection was placed on antibiotics by mouth. She has continued to worsen then presents with erythema and drainage from the wound as well as fever and chills. Pain is all present at the thenar eminence. She denies any numbness tingling. Able to make a full composite fist.    She sustained the injury at her job at a [employer]. The area where she fell does have rotten meat and insect that likely contaminated the wound.     Past Medical, Social and Family History:  Past Medical History:   Diagnosis Date   • ADD (attention deficit disorder) 2019   • Anxiety 05/31/2013   • Herpes simplex     EXTERNAL   • IBS (irritable bowel syndrome)    • Insomnia    • Jaundice    • Malignant melanoma in situ (HCC)    • Pneumonia    • PONV (postoperative nausea and vomiting)    • Reactive airway disease      Past Surgical History:   Procedure Laterality Date   • AUGMENTATION BREAST     • AUGMENTATION MAMMAPLASTY Bilateral 28years ago   • NC KNEE SCOPE,MED/LAT MENISECTOMY Right 04/06/2021    Procedure: KNEE ARTHROSCOPY, medial lateral menisectomy,chondroplasty,synovectomy,inj;  Surgeon: Viridiana Mcintosh DO;  Location: [address] [place] [address] OR;  Service: Orthopedics   • SKIN BIOPSY  2013    2 PUNCH BIOPSIES   • SKIN LESION EXCISION Right 2013    LEG   • TONSILLECTOMY     • TUBAL LIGATION       Allergies   Allergen Reactions   • Bacitracin Rash   • Neomycin Rash   • Polymyxin B Rash   • Vilazodone Other (See Comments)     Other reaction(s): abdomen pain     Current Facility-Administered Medications on File Prior to Visit   Medication Dose Route Frequency Provider Last Rate Last Admin   • influenza vaccine, recombinant, quadrivalent (FLUBLOK) IM injection 0.5 mL  0.5 mL Intramuscular Once Huey Esparza MD         Current Outpatient Medications on File Prior to Visit   Medication Sig Dispense Refill   • albuterol (PROVENTIL HFA,VENTOLIN HFA) 90 mcg/act inhaler INHALE 1 PUFF BY MOUTH EVERY 6 HOURS AS NEEDED FOR WHEEZE 6.7 Inhaler 5   • amoxicillin-clavulanate (AUGMENTIN) 875-125 mg per tablet  (Patient not taking: No sig reported)     • bimatoprost (LATISSE) 0.03 % ophthalmic solution PLACE 2 DROPS BOTH EYES AT BEDTIME APPLY EVENLY ALONG WITH SKIN ON THE UPPER EYELID AT BASE OF EYELASHES ONCE DAILY AT BEDTIME REPEAT FOR SECOND EYE (Patient not taking: No sig reported) 5 mL 6   • doxycycline hyclate (VIBRA-TABS) 100 mg tablet Take 100 mg by mouth 2 (two) times a day Take for ten days     • escitalopram (LEXAPRO) 20 mg tablet Take 1 tablet (20 mg total) by mouth daily 90 tablet 1   • ibuprofen (MOTRIN) 600 mg tablet Take 600 mg by mouth every 6 (six) hours as needed     • lisdexamfetamine (VYVANSE) 30 MG capsule Take 1 capsule (30 mg total) by mouth every morning Max Daily Amount: 30 mg 90 capsule 0   • lisdexamfetamine (VYVANSE) 50 MG capsule Take 1 capsule (50 mg total) by mouth every morning Max Daily Amount: 50 mg 90 capsule 0   • PreviDent 5000 Sensitive 1.1-5 % GEL BRUSH 1 TIME AT NIGHT FOR ONE MIN NOTHING MY MOUTH FOR 30 MIN     • valACYclovir (VALTREX) 1,000 mg tablet Take 1 tablet (1,000 mg total) by mouth 3 (three) times a day for 7 days 21 tablet 3   • zolpidem (AMBIEN CR) 6.25 MG CR tablet Take 1 tablet (6.25 mg total) by mouth daily at bedtime as needed for sleep 30 tablet 5     Social History     Socioeconomic History   • Marital status: /Civil Union     Spouse name: Not on file   • Number of children: Not on file   • Years of education: Not on file   • Highest education level: Not on file   Occupational History   • Occupation: EMPLOYED   • Occupation: [employer]   Tobacco Use   • Smoking status: Current Every Day Smoker     Packs/day: 1.00     Years: 42.00     Pack years: 42.00     Types: Cigarettes   • Smokeless tobacco: Never Used   Vaping Use   • Vaping Use: Never used   Substance and Sexual Activity   • Alcohol use: Never   • Drug use: Never   • Sexual activity: Yes     Partners: Male   Other Topics Concern   • Not on file   Social History Narrative    EMPLOYED     Social Determinants of Health     Financial Resource Strain: Not on file   Food Insecurity: Not on file   Transportation Needs: Not on file   Physical Activity: Not on file   Stress: Not on file   Social Connections: Not on file   Intimate Partner Violence: Not on file   Housing Stability: Not on file         I have reviewed the past medical, surgical, social and family history, medications and allergies as documented in the EMR. Review of systems: ROS is negative other than that noted in the HPI. Constitutional: Negative for fatigue and fever. HENT: Negative for sore throat. Respiratory: Negative for shortness of breath. Cardiovascular: Negative for chest pain. Gastrointestinal: Negative for abdominal pain. Endocrine: Negative for cold intolerance and heat intolerance. Genitourinary: Negative for flank pain. Musculoskeletal: Negative for back pain. Skin: Negative for rash. Allergic/Immunologic: Negative for immunocompromised state. Neurological: Negative for dizziness. Psychiatric/Behavioral: Negative for agitation. Physical Exam:    Last menstrual period 02/01/2021, not currently breastfeeding. General/Constitutional: NAD, well developed, well nourished  HENT: Normocephalic, atraumatic  CV: Intact distal pulses, regular rate  Resp: No respiratory distress or labored breathing  Lymphatic: No lymphadenopathy palpated  Neuro: Alert and Oriented x 3, no focal deficits  Psych: Normal mood, normal affect  Skin: Warm, dry, no rashes, no erythema      Left UE ORTHO EXAM  Wound over the thenar eminence of the left hand. Sutures in place. There is surrounding erythema as well as purulent drainage  Tender to palpation about the wound  + A-OK, finger cross, thumb retropulsion   SILT median/radial/ulnar nerve distribution   2+ radial pulse   BCR all fingers         Imaging    I reviewed and interpreted X-rays taken in clinic today which show no fractures. There does appear to be some foreign material present in the soft tissues of the thenar eminence.

## 2022-10-19 NOTE — OP NOTE
OPERATIVE REPORT  PATIENT NAME: Ev Rodriguez    :  1965  MRN: [id number]  Pt Location: [facility] OR ROOM 01    SURGERY DATE: 10/19/2022    Surgeon(s) and Role:     * Abrahan Linda MD - Primary    Preop Diagnosis:  Left hand laceration with infection       Procedure(s) (LRB):  DEBRIDEMENT WOUND AND DRESSING CHANGE ([address]) (Left)    Specimen(s):  ID Type Source Tests Collected by Time Destination   A : LEFT HAND WOUND  Wound Wound ANAEROBIC CULTURE AND GRAM STAIN, WOUND CULTURE Abrahan Linda MD 10/19/2022 1819        Estimated Blood Loss:   Minimal    Drains:  * No LDAs found *    Anesthesia Type:   Choice    Operative Indications:  [de-identified] year female presented 5 days after sustaining a laceration and contaminated wound requiring suture closure  She developed signs of infection 3 days ago was placed on antibiotics  Her symptoms progressed she presented to my office today with purulent drainage and extensive area of erythema  She was indicated for operative wound debridement  Operative Findings:  Necrotic skin and subcutaneous fat  Purulent fluid tracking along the fascial plane with a very small area that penetrated the fascia into the thenar musculature  Complications:   None    Procedure and Technique:  The patient was seen and identified in PACU  Indications for the procedure as well as risks and benefits and alternatives were discussed with the patient and she expressed understanding  A consent was signed by myself and the patient  The operative extremity was initialed  We proceeded to the operating room where general anesthesia was provided  A tourniquet was applied to the left upper extremity  The left upper extremity was prepped and draped in the usual sterile fashion  A time-out was completed confirming the correct operative site with full participation of the nursing staff and anesthesia staff      The limb was exsanguinated using elevation and a tourniquet was inflated to 250 mmHg  The previous laceration sutures were removed  The laceration was opened using a 15 blade through the original injury site  The initial wound measured 2 cm in length  The incision was extended 2 cm proximally to ensure full debridement of the infected site  A small area of necrotic skin and necrotic subcutaneous fat was sharply excised using a 15 blade and scissor  There was a small area violated fascia with a very small amount of purulent fluid communicating with the thenar muscle compartment  Cultures x2 were sent  1 cm of necrotic fascia was debrided with a scissor  The subcutaneous dissection was carried under the skin flaps to ensure there is no tracking purulent fluid in any direction  Once all purulent fluid and necrotic tissue was excised 1 L of normal saline was used to irrigate the site  After irrigation there is no further necrotic tissue or purulent fluid present  At this point the wound measured 4 cm in length and 1  Cm in depth  The wound bed was fresh bleeding tissue  The wound was closed with interrupted nylon suture  A sterile dressing and splint were applied  The the patient emerged from anesthesia without any obvious complications  The procedure was overall well tolerated without any apparent complications      Patient Disposition:  extubated and stable        SIGNATURE: Neto Lopez MD  DATE: October 19, 2022  TIME: 6:46 PM

## 2022-10-19 NOTE — PROGRESS NOTES
Orthopedic Sports Medicine New Patient Visit     Assesment:   56 y o  female with left hand infection     Plan:    The patient had a head injury 5 days ago that was sutured  She began having signs of infection 2 days ago and was placed on antibiotics  She presents today with pale and drainage from the wound, extensive cellulitis, and fever and chills  I recommend that we proceed to the operating room later this evening to perform an incision and debridement of the wound  She will require admission and IV antibiotics with Infectious Disease consult to determine optimal antibiotic course when cultures are available  She may require more than 1 debridement in the operating room depending on the appearance of the wound during the 1st trip to the OR and her response to antibiotics  She will be remain NPO at this time and a direct admit to the hospital for surgical intervention with me later this evening  Chief Complaint   Patient presents with   • Left Hand - Pain, Swelling       History of Present Illness: The patient is a 56 y o  female with 5 days out from a hand wound at work  She was sutured in the ED that day  Three days later she developed signs of infection was placed on antibiotics by mouth  She has continued to worsen then presents with erythema and drainage from the wound as well as fever and chills  Pain is all present at the thenar eminence  She denies any numbness tingling  Able to make a full composite fist     She sustained the injury at her job at a [employer]  The area where she fell does have rotten meat and insect that likely contaminated the wound      Past Medical, Social and Family History:  Past Medical History:   Diagnosis Date   • ADD (attention deficit disorder) 2019   • Anxiety 5/31/2013   • Arthritis    • Herpes simplex     EXTERNAL   • IBS (irritable bowel syndrome)    • Insomnia    • Jaundice    • Malignant melanoma in situ (HCC)    • Pneumonia    • PONV (postoperative nausea and vomiting)    • Reactive airway disease      Past Surgical History:   Procedure Laterality Date   • AUGMENTATION BREAST     • AUGMENTATION MAMMAPLASTY Bilateral 28years ago   • [place] KNEE SCOPE,MED/LAT MENISECTOMY Right 4/6/2021    Procedure: KNEE ARTHROSCOPY, medial lateral menisectomy,chondroplasty,synovectomy,inj;  Surgeon: Deb Velazquez DO;  Location: [facility] MAIN OR;  Service: Orthopedics   • SKIN BIOPSY  2013    2 PUNCH BIOPSIES   • SKIN LESION EXCISION Right 2013    LEG   • TUBAL LIGATION       Allergies   Allergen Reactions   • Bacitracin Rash   • Neomycin Rash   • Polymyxin B Rash   • Vilazodone Other (See Comments)     Other reaction(s): abdomen pain     Current Outpatient Medications on File Prior to Visit   Medication Sig Dispense Refill   • albuterol (PROVENTIL HFA,VENTOLIN HFA) 90 mcg/act inhaler INHALE 1 PUFF BY MOUTH EVERY 6 HOURS AS NEEDED FOR WHEEZE 6 7 Inhaler 5   • doxycycline hyclate (VIBRA-TABS) 100 mg tablet      • escitalopram (LEXAPRO) 20 mg tablet Take 1 tablet (20 mg total) by mouth daily 90 tablet 1   • ibuprofen (MOTRIN) 600 mg tablet      • lisdexamfetamine (VYVANSE) 30 MG capsule Take 1 capsule (30 mg total) by mouth every morning Max Daily Amount: 30 mg 90 capsule 0   • lisdexamfetamine (VYVANSE) 50 MG capsule Take 1 capsule (50 mg total) by mouth every morning Max Daily Amount: 50 mg 90 capsule 0   • PreviDent 5000 Sensitive 1 1-5 % GEL BRUSH 1 TIME AT NIGHT FOR ONE MIN NOTHING MY MOUTH FOR 30 MIN     • zolpidem (AMBIEN CR) 6 25 MG CR tablet Take 1 tablet (6 25 mg total) by mouth daily at bedtime as needed for sleep 30 tablet 5   • amoxicillin-clavulanate (AUGMENTIN) 875-125 mg per tablet  (Patient not taking: Reported on 10/19/2022)     • bimatoprost (LATISSE) 0 03 % ophthalmic solution PLACE 2 DROPS BOTH EYES AT BEDTIME APPLY EVENLY ALONG WITH SKIN ON THE UPPER EYELID AT BASE OF EYELASHES ONCE DAILY AT BEDTIME REPEAT FOR SECOND EYE (Patient not taking: Reported on 10/19/2022) 5 mL 6   • valACYclovir (VALTREX) 1,000 mg tablet Take 1 tablet (1,000 mg total) by mouth 3 (three) times a day for 7 days 21 tablet 3     No current facility-administered medications on file prior to visit  Social History     Socioeconomic History   • Marital status: /Civil Union     Spouse name: Not on file   • Number of children: Not on file   • Years of education: Not on file   • Highest education level: Not on file   Occupational History   • Occupation: EMPLOYED   • Occupation: [employer]   Tobacco Use   • Smoking status: Current Every Day Smoker     Packs/day: 1 00     Years: 42 00     Pack years: 42 00     Types: Cigarettes   • Smokeless tobacco: Never Used   Vaping Use   • Vaping Use: Never used   Substance and Sexual Activity   • Alcohol use: Yes     Comment: social   • Drug use: No   • Sexual activity: Yes     Partners: Male   Other Topics Concern   • Not on file   Social History Narrative    EMPLOYED     Social Determinants of Health     Financial Resource Strain: Not on file   Food Insecurity: Not on file   Transportation Needs: Not on file   Physical Activity: Not on file   Stress: Not on file   Social Connections: Not on file   Intimate Partner Violence: Not on file   Housing Stability: Not on file         I have reviewed the past medical, surgical, social and family history, medications and allergies as documented in the EMR  Review of systems: ROS is negative other than that noted in the HPI  Constitutional: Negative for fatigue and fever  HENT: Negative for sore throat  Respiratory: Negative for shortness of breath  Cardiovascular: Negative for chest pain  Gastrointestinal: Negative for abdominal pain  Endocrine: Negative for cold intolerance and heat intolerance  Genitourinary: Negative for flank pain  Musculoskeletal: Negative for back pain  Skin: Negative for rash  Allergic/Immunologic: Negative for immunocompromised state     Neurological: Negative for dizziness  Psychiatric/Behavioral: Negative for agitation  Physical Exam:    Height 5' 6 5" (1 689 m), weight 64 9 kg (143 lb), not currently breastfeeding  General/Constitutional: NAD, well developed, well nourished  HENT: Normocephalic, atraumatic  CV: Intact distal pulses, regular rate  Resp: No respiratory distress or labored breathing  Lymphatic: No lymphadenopathy palpated  Neuro: Alert and Oriented x 3, no focal deficits  Psych: Normal mood, normal affect  Skin: Warm, dry, no rashes, no erythema      Left UE ORTHO EXAM  Wound over the thenar eminence of the left hand  Sutures in place  There is surrounding erythema as well as purulent drainage  Tender to palpation about the wound  + A-OK, finger cross, thumb retropulsion   SILT median/radial/ulnar nerve distribution   2+ radial pulse   BCR all fingers         Imaging    I reviewed and interpreted X-rays taken in clinic today which show no fractures  There does appear to be some foreign material present in the soft tissues of the thenar eminence

## 2022-10-19 NOTE — ANESTHESIA PROCEDURE NOTES
Peripheral Block    Patient location during procedure: holding area  Start time: 10/19/2022 7:05 PM  Reason for block: at surgeon's request and post-op pain management  Staffing  Performed: Anesthesiologist   Anesthesiologist: Mirela Kemp MD  Preanesthetic Checklist  Completed: patient identified, IV checked, site marked, risks and benefits discussed, surgical consent, monitors and equipment checked, pre-op evaluation and timeout performed  Peripheral Block  Patient position: sitting  Prep: ChloraPrep  Patient monitoring: continuous pulse ox and frequent blood pressure checks  Block type: supraclavicular  Laterality: left  Injection technique: single-shot  Procedures: ultrasound guided, Ultrasound guidance required for the procedure to increase accuracy and safety of medication placement and decrease risk of complications  Ultrasound permanent image savedropivacaine (NAROPIN) 0 5 % - Perineural   25 mL - 10/19/2022 7:05:00 PM  Needle  Needle type: Stimuplex   Needle gauge: 20g  Needle length: 4in    Needle localization: ultrasound guidance  Test dose: negative  Assessment  Injection assessment: incremental injection, local visualized surrounding nerve on ultrasound, no paresthesia on injection and negative aspiration for heme  Paresthesia pain: none  Heart rate change: no  Slow fractionated injection: yes  Post-procedure:  site cleaned  patient tolerated the procedure well with no immediate complications  Additional Notes  Dexamethasone 4 mg IV

## 2022-10-20 PROCEDURE — 87081 CULTURE SCREEN ONLY: CPT | Performed by: INTERNAL MEDICINE

## 2022-10-20 PROCEDURE — 97166 OT EVAL MOD COMPLEX 45 MIN: CPT

## 2022-10-20 PROCEDURE — 99255 IP/OBS CONSLTJ NEW/EST HI 80: CPT | Performed by: INTERNAL MEDICINE

## 2022-10-20 PROCEDURE — 99232 SBSQ HOSP IP/OBS MODERATE 35: CPT | Performed by: ORTHOPAEDIC SURGERY

## 2022-10-20 PROCEDURE — 90715 TDAP VACCINE 7 YRS/> IM: CPT | Performed by: INTERNAL MEDICINE

## 2022-10-20 RX ORDER — MORPHINE SULFATE 4 MG/ML
4 INJECTION, SOLUTION INTRAMUSCULAR; INTRAVENOUS ONCE
Status: COMPLETED | OUTPATIENT
Start: 2022-10-20 | End: 2022-10-20

## 2022-10-20 RX ADMIN — PIPERACILLIN AND TAZOBACTAM 3.38 G: 3; .375 INJECTION, POWDER, FOR SOLUTION INTRAVENOUS at 19:08

## 2022-10-20 RX ADMIN — OXYCODONE HYDROCHLORIDE 5 MG: 5 TABLET ORAL at 13:01

## 2022-10-20 RX ADMIN — VANCOMYCIN HYDROCHLORIDE 1000 MG: 1 INJECTION, SOLUTION INTRAVENOUS at 09:24

## 2022-10-20 RX ADMIN — MORPHINE SULFATE 4 MG: 4 INJECTION INTRAVENOUS at 17:03

## 2022-10-20 RX ADMIN — PIPERACILLIN AND TAZOBACTAM 3.38 G: 3; .375 INJECTION, POWDER, FOR SOLUTION INTRAVENOUS at 01:28

## 2022-10-20 RX ADMIN — OXYCODONE HYDROCHLORIDE 10 MG: 10 TABLET ORAL at 22:06

## 2022-10-20 RX ADMIN — OXYCODONE HYDROCHLORIDE 10 MG: 10 TABLET ORAL at 18:07

## 2022-10-20 RX ADMIN — ACETAMINOPHEN 975 MG: 325 TABLET ORAL at 10:59

## 2022-10-20 RX ADMIN — MORPHINE SULFATE 2 MG: 2 INJECTION, SOLUTION INTRAMUSCULAR; INTRAVENOUS at 13:55

## 2022-10-20 RX ADMIN — PIPERACILLIN AND TAZOBACTAM 3.38 G: 3; .375 INJECTION, POWDER, FOR SOLUTION INTRAVENOUS at 13:01

## 2022-10-20 RX ADMIN — DOCUSATE SODIUM 100 MG: 100 CAPSULE, LIQUID FILLED ORAL at 10:59

## 2022-10-20 RX ADMIN — VANCOMYCIN HYDROCHLORIDE 1000 MG: 1 INJECTION, SOLUTION INTRAVENOUS at 19:53

## 2022-10-20 RX ADMIN — ESCITALOPRAM OXALATE 20 MG: 10 TABLET ORAL at 10:59

## 2022-10-20 RX ADMIN — PIPERACILLIN AND TAZOBACTAM 3.38 G: 3; .375 INJECTION, POWDER, FOR SOLUTION INTRAVENOUS at 08:04

## 2022-10-20 RX ADMIN — TETANUS TOXOID, REDUCED DIPHTHERIA TOXOID AND ACELLULAR PERTUSSIS VACCINE, ADSORBED 0.5 ML: 5; 2.5; 8; 8; 2.5 SUSPENSION INTRAMUSCULAR at 14:02

## 2022-10-20 RX ADMIN — ACETAMINOPHEN 975 MG: 325 TABLET ORAL at 19:08

## 2022-10-20 NOTE — UTILIZATION REVIEW
OBSERVATION 10/19/22 @ 1619 CONVERTED TO INPATIENT 10/20/22 @ 1608 DUE TO NECROTIZING CELLULITIS REQUIRING CONTINUED IV ABX, OR WASHOUT  Initial Clinical Review    Admission: Date/Time/Statement:   Admission Orders (From admission, onward)     Ordered        10/20/22 1608  Inpatient Admission  Once                      Orders Placed This Encounter   Procedures   • Inpatient Admission     Standing Status:   Standing     Number of Occurrences:   1     Order Specific Question:   Level of Care     Answer:   Med Surg [16]     Order Specific Question:   Estimated length of stay     Answer:   More than 2 Midnights     Order Specific Question:   Certification     Answer:   I certify that inpatient services are medically necessary for this patient for a duration of greater than two midnights  See H&P and MD Progress Notes for additional information about the patient's course of treatment  Initial Presentation: 56 y o  female directly admitted  from orthopedic surgery office for I/D of hand wound  Initially injured 5 days prior while at work, wound was sutured in ED  3 Days later, started with signs of infection, started on po abx  Worsening erythema and drainage, fever, chills  Admitted under observation then converted to inpatient for Wound to left hand  IV abx started  ID consult  Keep NPO  Wound over the thenar eminence of the left hand  Sutures in place  There is surrounding erythema as well as purulent drainage  Tender to palpation about the wound  + A-OK, finger cross, thumb retropulsion   SILT median/radial/ulnar nerve distribution   2+ radial pulse   BCR all fingers   OPERATIVE NOTE:    SURGERY DATE: 10/19/2022  Procedure(s) (LRB):  DEBRIDEMENT WOUND AND DRESSING CHANGE ([address] OUT) (Left)  Operative Findings:  Necrotic skin and subcutaneous fat  Purulent fluid tracking along the fascial plane with a very small area that penetrated the fascia into the thenar musculature    Anesthesia Type- general with ASA Monitors         Date:   10/20  Day 2:    Ortho consult:   Dressing changed  Continue with iv abx  NWB through the left wrist  ELevate LUE  Gentle motion  ID consult:  Necrotizing cellulitis of hand  Given sustained laceration with exposure to contaminated floor, consider environmental pathogens  Continue with vanco, zosyn  Monitor renal function closely  Follow intraroperative cultures  10/21 UPDATE:  Wound relatively dry  Swelling radial side of laceration  Spontaneous fluid eruption more serous, slightly cloudy  Continues with some discomfort with ERASMO  Brisk cap refill  Final cultures pending  Continue with ice and IV pain meds  ID consult:  Continue zosyn for now  Monitor wbcs        Temperature Pulse Respirations Blood Pressure SpO2   10/19/22 1620 10/19/22 1620 10/19/22 1842 10/19/22 1620 10/19/22 1620   98 4 °F (36 9 °C) 73 16 149/90 97 %      Temp src Heart Rate Source Patient Position - Orthostatic VS BP Location FiO2 (%)   -- -- -- -- --             Pain Score       10/19/22 1552       5          Wt Readings from Last 1 Encounters:   10/19/22 64 9 kg (143 lb)     Additional Vital Signs:   Date/Time Temp Pulse Resp BP MAP (mmHg) SpO2 O2 Flow Rate (L/min) O2 Device Cardiac (WDL) Patient Position - Orthostatic VS   10/21/22 07:34:28 97 5 °F (36 4 °C) 60 19 108/69 82 96 % -- -- -- --       Date/Time Temp Pulse Resp BP MAP (mmHg) SpO2 O2 Flow Rate (L/min) O2 Device Cardiac (WDL)   10/20/22 07:40:14 -- 69 18 111/62 78 95 % -- -- --   10/20/22 03:05:32 97 4 °F (36 3 °C) Abnormal  64 16 112/67 82 96 % -- -- --   10/19/22 2300 97 7 °F (36 5 °C) 60 18 101/64 76 93 % -- None (Room air) --   10/19/22 21:51:37 -- 70 17 115/60 78 93 % -- None (Room air) --   10/19/22 20:36:38 97 6 °F (36 4 °C) 77 17 143/81 102 97 % -- -- --   10/19/22 1940 -- 88 18 147/82 104 97 % 2 L/min -- --   10/19/22 1912 -- -- -- -- -- -- 3 L/min Nasal cannula WDL   10/19/22 1857 -- 76 18 153/92 -- 96 % 3 L/min Nasal cannula [facility] 10/19/22 1842 97 °F (36 1 °C) Abnormal  97 16 149/80 -- 97 % 6 L/min Simple mask WDL   10/19/22 16:20:12 98 4 °F (36 9 °C) 73 -- 149/90 110 97 % -- -- --       Pertinent Labs/Diagnostic Test Results:   10/17 Xray left hand:  No acute osseous abnormality      Tiny punctate densities in the thenar eminence region soft tissues could be due to tiny foreign bodies versus artifact          Results from last 7 days   Lab Units 10/21/22  0651 10/19/22  1700   WBC Thousand/uL 9 32 8 56   HEMOGLOBIN g/dL 11 8 12 8   HEMATOCRIT % 35 5 38 1   PLATELETS Thousands/uL 267 298   NEUTROS ABS Thousands/µL 4 93 4 71         Results from last 7 days   Lab Units 10/21/22  0651 10/19/22  1700   SODIUM mmol/L 137 135   POTASSIUM mmol/L 4 4 4 3   CHLORIDE mmol/L 104 100   CO2 mmol/L 29 27   ANION GAP mmol/L 4 8   BUN mg/dL 19 13   CREATININE mg/dL 0 73 0 85   EGFR ml/min/1 73sq m 93 77   CALCIUM mg/dL 8 9 9 8             Results from last 7 days   Lab Units 10/21/22  0651 10/19/22  1700   GLUCOSE RANDOM mg/dL 86 85         Results from last 7 days   Lab Units 10/19/22  1700   CRP mg/L 3 2*   SED RATE mm/hour 10         Past Medical History:   Diagnosis Date   • ADD (attention deficit disorder) 2019   • Anxiety 05/31/2013   • Herpes simplex     EXTERNAL   • IBS (irritable bowel syndrome)    • Insomnia    • Jaundice    • Malignant melanoma in situ (HCC)    • Pneumonia    • PONV (postoperative nausea and vomiting)    • Reactive airway disease        Admitting Diagnosis: Cellulitis of left upper limb [L03 114]  Age/Sex: 56 y o  female  Admission Orders:  Scheduled Medications:  acetaminophen, 975 mg, Oral, Q8H  docusate sodium, 100 mg, Oral, BID  escitalopram, 20 mg, Oral, Daily  influenza vaccine, 0 5 mL, Intramuscular, Once  piperacillin-tazobactam, 3 375 g, Intravenous, Q6H  vancomycin, 1,000 mg, Intravenous, Q12H      Continuous IV Infusions:     PRN Meds:  albuterol, 2 puff, Inhalation, Q4H PRN  aluminum-magnesium hydroxide-simethicone, 30 mL, Oral, Q6H PRN  calcium carbonate, 1,000 mg, Oral, Daily PRN  lactated ringers, 1,000 mL, Intravenous, Once PRN   And  lactated ringers, 1,000 mL, Intravenous, Once PRN  nicotine, 1 patch, Transdermal, PRN  ondansetron, 4 mg, Intravenous, Q6H PRN  oxyCODONE, 10 mg, Oral, Q4H PRNx 2 10/20, x 3 10/21  oxyCODONE, 5 mg, Oral, Q4H PRN  sodium chloride, 1,000 mL, Intravenous, Once PRN   And  sodium chloride, 1,000 mL, Intravenous, Once PRN  zolpidem, 5 mg, Oral, HS PRN        IP CONSULT TO INFECTIOUS DISEASES  IP CONSULT TO PHARMACY    Network Utilization Review Department  ATTENTION: Please call with any questions or concerns to [phone] and carefully listen to the prompts so that you are directed to the right person  All voicemails are confidential   Radha Mcclelland all requests for admission clinical reviews, approved or denied determinations and any other requests to dedicated fax number below belonging to the campus where the patient is receiving treatment   List of dedicated fax numbers for the Facilities:  [address] [place] [address] DENIALS (Administrative/Medical Necessity) [phone]   [address] (Maternity/NICU/Pediatrics) [phone]    Arely Carroll [phone]   [facility] [address] [phone]   [address] [place] [address] [address] [phone]   [facility] Estevan Macdonald [address] [place] [address] [facility] [phone]

## 2022-10-20 NOTE — CONSULTS
Consultation - Infectious Disease   Candice Bowden 56 y o  female MRN: [id number]  Unit/Bed#: -01 Encounter: [id number]      IMPRESSION & RECOMMENDATIONS:   Impression/Recommendations: This is a 56 y o  female, otherwise healthy, who fell at work on 10/14 and sustained a laceration in the palm, with contact with a contaminated floor  Patient's wound was primarily suture at a local urgent care center, with patient placed on p o  antibiotic initially with Augmentin, then doxycycline  Patient developed worsening had infection and is now admitted, status post I&D and on IV antibiotic  1  Necrotizing cellulitis of left hand  Patient is status post I&D with findings of necrotic tissue but purulence mostly tracking along fascia with only slight penetration through fascia  Patient is otherwise clinically and systemically well  Patient had fever/chills at home but no fever here  WBC is normal   She is hemodynamically stable, without hypotension  Given that patient sustained laceration with exposure to contaminated floor, we have to consider the possibilities of environmental pathogens, in addition to the usual skin pathogens in her infection  For now, we can continue broad-spectrum antibiotic  Hopefully, we can deescalate antibiotic regimen, depending on operative culture results  Despite severity of infection, given the patient is healthy, with no significant comorbid illnesses, hopefully she will do well  With patient on vancomycin/Zosyn regimen, will need to monitor renal function closely  Continue vancomycin/Zosyn for now  Monitor renal function closely on vancomycin/Zosyn  Serial hand exams  Monitor temperature/WBC  Follow-up on operative culture  Will also check MRSA screen  Adjust antibiotic per operative culture results  Hopefully, if patient does well, we can transition to p o  antibiotic in a few days  2  Left hand laceration, secondary to fall, with exposure to contaminated angela  Patient is status post I&D  Patient's last TD booster was 7 years ago  Given severity of injury, will give patient TD booster  TD booster  Outpatient records reviewed in detail  Discussed with patient in detail regarding the above plan  Discussed with Dr Mathew Brown from Orthopedic surgery service  Thank you for this consultation  We will follow along with you  HISTORY OF PRESENT ILLNESS:  Reason for Consult:  Necrotizing cellulitis of left hand  HPI: Arvind Hernandez is a 56 y o  female, otherwise healthy, fell at work on 10/14 and struck her left palm on a dirty floor  She sustained a laceration  Patient was seen at a local urgent care center  She had wound cleansed and primarily sutured  She was started on Augmentin initially  The next day, she returned to urgent care center with drainage from incision  Antibiotic was changed to doxycycline  Patient saw her PCP yesterday with worsening wound  She was referred to Orthopedic surgery  At visit with orthopedic surgeon, there was concern for deep infection in hand  Therefore, patient was admitted and taken to OR last night undergo I&D  Deep purulence and necrotic tissue was noted at surgery  Patient was started on vancomycin/Zosyn  We are asked to evaluate the patient  At present, patient has moderate pain in the left hand  Patient states that the floor where her hand struck was quite dirty, with rottened meat and dog food  As in above, patient sustained a laceration in her palm from the fall on this floor  Patient did not have fever initially but developed fever and chills the day prior to admission  Patient/TD booster was in 2015  REVIEW OF SYSTEMS:  A complete system-based review was done  Except for what is noted in HPI above, ROS of systems is otherwise negative      PAST MEDICAL HISTORY:  Past Medical History:   Diagnosis Date   • ADD (attention deficit disorder) 2019   • Anxiety 05/31/2013   • Herpes simplex     EXTERNAL   • IBS (irritable bowel syndrome)    • Insomnia    • Jaundice    • Malignant melanoma in situ (HCC)    • Pneumonia    • PONV (postoperative nausea and vomiting)    • Reactive airway disease      Past Surgical History:   Procedure Laterality Date   • AUGMENTATION BREAST     • AUGMENTATION MAMMAPLASTY Bilateral 28years ago   • OR KNEE SCOPE,MED/LAT MENISECTOMY Right 2021    Procedure: KNEE ARTHROSCOPY, medial lateral menisectomy,chondroplasty,synovectomy,inj;  Surgeon: Doug Clarke DO;  Location: [facility] MAIN OR;  Service: Orthopedics   • SKIN BIOPSY      2 PUNCH BIOPSIES   • SKIN LESION EXCISION Right     LEG   • TONSILLECTOMY     • TUBAL LIGATION     • WOUND DEBRIDEMENT Left 10/19/2022    Procedure: DEBRIDEMENT WOUND AND DRESSING CHANGE [facility]); Surgeon: Lisa Nuñez MD;  Location: [facility] MAIN OR;  Service: Orthopedics     Problem list reviewed  FAMILY HISTORY:  Non-contributory    SOCIAL HISTORY:  Social History     Substance and Sexual Activity   Alcohol Use Never     Social History     Substance and Sexual Activity   Drug Use Never     Social History     Tobacco Use   Smoking Status Current Every Day Smoker   • Packs/day: 1 00   • Years: 42 00   • Pack years: 42 00   • Types: Cigarettes   Smokeless Tobacco Never Used       ALLERGIES:  Allergies   Allergen Reactions   • Bacitracin Rash   • Neomycin Rash   • Polymyxin B Rash   • Vilazodone Other (See Comments)     Other reaction(s): abdomen pain       MEDICATIONS:  All current active medications have been reviewed  Patient is currently on vancomycin/Zosyn  PHYSICAL EXAM:  Vitals:  Temp:  [97 °F (36 1 °C)-98 6 °F (37 °C)] 97 4 °F (36 3 °C)  HR:  [60-97] 69  Resp:  [16-18] 18  BP: (101-153)/(60-92) 111/62  SpO2:  [93 %-97 %] 95 %  Temp (24hrs), Av 8 °F (36 6 °C), Min:97 °F (36 1 °C), Max:98 6 °F (37 °C)  Current: Temperature: (!) 97 4 °F (36 3 °C)     Physical Exam:  General:  Well-nourished, well-developed, in no acute distress   Awake, alert and oriented x 3  Eyes:  Conjunctive clear with no hemorrhages or effusions  Oropharynx:  No ulcers, no lesions, pharynx benign, no tonsillitis  Neck:  Supple, no lymphadenopathy, no mass, nontender  Lungs:  Expansion symmetric, no rales, no wheezing, no accessory muscle use  Cardiac:  Regular rate and rhythm, normal S1, normal S2, no murmurs  Abdomen:  Soft, nondistended, non-tender, no HSM  Extremities:  No edema  Left hand with dressing in place  Dressing is dry  No erythema/warmth beyond dressing  Mild to moderate tenderness  Photo of hand from patient's phone reviewed  Incision with suture in thenar space  Mild clear drainage with moderate erythema  Skin:  No rashes, no ulcers  Neurological:  Moves all four extremities spontaneously, sensation grossly intact    LABS, IMAGING, & OTHER STUDIES:  Lab Results:  I have personally reviewed pertinent labs  Results from last 7 days   Lab Units 10/19/22  1700   POTASSIUM mmol/L 4 3   CHLORIDE mmol/L 100   CO2 mmol/L 27   BUN mg/dL 13   CREATININE mg/dL 0 85   EGFR ml/min/1 73sq m 77   CALCIUM mg/dL 9 8     Results from last 7 days   Lab Units 10/19/22  1700   WBC Thousand/uL 8 56   HEMOGLOBIN g/dL 12 8   PLATELETS Thousands/uL 298           Imaging Studies:   I have personally reviewed pertinent imaging study reports and images in PACS  EKG, Pathology, and Other Studies:   I have personally reviewed pertinent reports

## 2022-10-20 NOTE — PROGRESS NOTES
Progress Note - Orthopedics   Haris Moreno 56 y o  female MRN: [id number]  Unit/Bed#: -01 Encounter: [id number]    Assessment:  57 yo with left hand laceration laceration and purulent infection     Plan:  Dressing changed this morning  No Drainage which is a good sign  I am hopeful that the infection will continue to improve with broad spectrum antibiotics  Will plan to narrow as culture data is available  Plan to discuss with infectious disease team today given the possibility of a highly contaminated wound- rotten meat and insects in the area where she cut her hand  Weight bearing: NWB through the left wrist  Elevate the LUE  Gentle motion of the hand encouraged to limit edema and pain    VTE Pharmacologic Prophylaxis: low risk for DVT  Encourage ambulation   VTE Mechanical Prophylaxis: sequential compression device    Subjective: No acute events  Pain well controlled with block today  Sensation and motor coming back now  Vitals: Blood pressure 111/62, pulse 69, temperature (!) 97 4 °F (36 3 °C), resp  rate 18, last menstrual period 02/01/2021, SpO2 95 %, not currently breastfeeding  ,There is no height or weight on file to calculate BMI  Intake/Output Summary (Last 24 hours) at 10/20/2022 0932  Last data filed at 10/19/2022 1851  Gross per 24 hour   Intake 650 ml   Output --   Net 650 ml       Invasive Devices  Report    Peripheral Intravenous Line  Duration           Peripheral IV 10/19/22 Right Antecubital <1 day              No acute distress  No labored respiration  Normal rate    LUE  Dressing changed  Wound with a small area of blood drainage on the bandage  No purulent drainage  The erythema has improved  +A-ok, finger cross, thumb retropulsion   Sensation returning now after block   Cap refill <2 secs  Palpable radial pulse    Lab, Imaging and other studies: I have personally reviewed pertinent lab results

## 2022-10-20 NOTE — CASE MANAGEMENT
Case Management Assessment & Discharge Planning Note    Patient name Analia Dubose  Location [address] [phone]/-[id number] MRN [id number]  : 1965 Date 10/20/2022       Current Admission Date: 10/19/2022  Current Admission Diagnosis:Laceration of left hand with infection   Patient Active Problem List    Diagnosis Date Noted   • Cellulitis of left hand 10/19/2022   • Laceration of left hand with infection 10/19/2022   • Numbness and tingling 2022   • Depression, recurrent ([facility] [address] ) 2022   • Tinea unguium 2022   • Tear of medial meniscus of right knee 2021   • Chronic pain of right knee 2021   • History of COVID-19 2020   • Other hyperlipidemia 10/17/2019   • Adult BMI 25 0-25 9 kg/sq m 10/17/2019   • Current smoker 10/17/2019   • Attention deficit hyperactivity disorder (ADHD), combined type 06/15/2019   • Elevated BP without diagnosis of hypertension 10/23/2018   • Major depression, single episode 2016   • Malignant melanoma of skin of trunk ([facility] [address] ) 2014   • Insomnia 2013   • Irritable bowel syndrome 2013   • Anxiety 2013      LOS (days): 1  Geometric Mean LOS (GMLOS) (days):   Days to GMLOS:     OBJECTIVE:    Risk of Unplanned Readmission Score: 4 55         Current admission status: Observation  Referral Reason: Other (Discharge planning)    Preferred Pharmacy:   [facility] [address] [id number], [address] [facility][note 3][address] [place] [id number]  Phone: [phone] Fax: [phone]    [id number], [address] [place] [address] [address]  [address] [facility] [address]  Phone: [phone] Fax: [phone]    Primary Care Provider: Kelley Tiwari PA-C    Primary Insurance: BLUE CROSS  Secondary Insurance:     ASSESSMENT:  Too Herrera Proxies    There are no active Health Care Proxies on file         Advance Directives  Does patient have a Health Care POA?: No  Was patient offered paperwork?: Yes  Does patient currently have a Health Care decision maker?: No  Does patient have Advance Directives?: No  Was patient offered paperwork?: Yes  Primary Contact: Daryl Norwood - spouse         Readmission Root Cause  30 Day Readmission: No    Patient Information  Admitted from[de-identified] Home  Mental Status: Alert  During Assessment patient was accompanied by: Not accompanied during assessment  Assessment information provided by[de-identified] Patient  Primary Caregiver: Self  Support Systems: Self, Spouse/significant other  [place] of Residence: [facility] [address] do you live in?: Via [employer] entry access options   Select all that apply : Stairs  Number of steps to enter home : 5  Do the steps have railings?: Yes  Type of Current Residence: [place]  In the last 12 months, was there a time when you were not able to pay the mortgage or rent on time?: No  In the last 12 months, how many places have you lived?: 1  In the last 12 months, was there a time when you did not have a steady place to sleep or slept in a shelter (including now)?: No  Homeless/housing insecurity resource given?: N/A  Living Arrangements: Lives w/ Spouse/significant other  Is patient a ?: No    Activities of Daily Living Prior to Admission  Functional Status: Independent  Completes ADLs independently?: Yes  Ambulates independently?: Yes  Does patient use assisted devices?: No  Does patient currently own DME?: No  Does patient have a history of Outpatient Therapy (PT/OT)?: No  Does the patient have a history of Short-Term Rehab?: No  Does patient have a history of HHC?: No  Does patient currently have [facility]?: No         Patient Information Continued  Income Source: Employed  Does patient have prescription coverage?: Yes  Within the past 12 months, you worried that your food would run out before you got the money to buy more : Never true  Within the past 12 months, the food you bought just didn't last and you didn't have money to get more : Never true  Food insecurity resource given?: N/A  Does patient receive dialysis treatments?: No  Does patient have a history of substance abuse?: No  Does patient have a history of Mental Health Diagnosis?: No         Means of Transportation  Means of Transport to Appts[de-identified] Drives Self  In the past 12 months, has lack of transportation kept you from medical appointments or from getting medications?: No  In the past 12 months, has lack of transportation kept you from meetings, work, or from getting things needed for daily living?: No  Was application for public transport provided?: N/A        DISCHARGE DETAILS:    Discharge planning discussed with[de-identified] Patient  Freedom of Choice: Yes  Comments - Freedom of Choice: Pt denies any current CM dischagre needs  Pt will return home with spouse on d/c  CM to follow                       Requested [address]         Is the patient interested in [place] [address] at discharge?: No    DME Referral Provided  Referral made for DME?: No         Would you like to participate in our [address] service program?  : No - Declined    Treatment Team Recommendation: Home  Discharge Destination Plan[de-identified] Home  Transport at Discharge : Family

## 2022-10-20 NOTE — PLAN OF CARE
Problem: PAIN - ADULT  Goal: Verbalizes/displays adequate comfort level or baseline comfort level  Description: Interventions:  - Encourage patient to monitor pain and request assistance  - Assess pain using appropriate pain scale  - Administer analgesics based on type and severity of pain and evaluate response  - Implement non-pharmacological measures as appropriate and evaluate response  - Consider cultural and social influences on pain and pain management  - Notify physician/advanced practitioner if interventions unsuccessful or patient reports new pain  Outcome: Progressing     Problem: INFECTION - ADULT  Goal: Absence or prevention of progression during hospitalization  Description: INTERVENTIONS:  - Assess and monitor for signs and symptoms of infection  - Monitor lab/diagnostic results  - Monitor all insertion sites, i e  indwelling lines, tubes, and drains  - Monitor endotracheal if appropriate and nasal secretions for changes in amount and color  - [place] appropriate cooling/warming therapies per order  - Administer medications as ordered  - Instruct and encourage patient and family to use good hand hygiene technique  - Identify and instruct in appropriate isolation precautions for identified infection/condition  Outcome: Progressing

## 2022-10-20 NOTE — PLAN OF CARE
Problem: PAIN - ADULT  Goal: Verbalizes/displays adequate comfort level or baseline comfort level  Description: Interventions:  - Encourage patient to monitor pain and request assistance  - Assess pain using appropriate pain scale  - Administer analgesics based on type and severity of pain and evaluate response  - Implement non-pharmacological measures as appropriate and evaluate response  - Consider cultural and social influences on pain and pain management  - Notify physician/advanced practitioner if interventions unsuccessful or patient reports new pain  Outcome: Progressing     Problem: INFECTION - ADULT  Goal: Absence or prevention of progression during hospitalization  Description: INTERVENTIONS:  - Assess and monitor for signs and symptoms of infection  - Monitor lab/diagnostic results  - Monitor all insertion sites, i e  indwelling lines, tubes, and drains  - Monitor endotracheal if appropriate and nasal secretions for changes in amount and color  - [place] appropriate cooling/warming therapies per order  - Administer medications as ordered  - Instruct and encourage patient and family to use good hand hygiene technique  - Identify and instruct in appropriate isolation precautions for identified infection/condition  Outcome: Progressing  Goal: Absence of fever/infection during neutropenic period  Description: INTERVENTIONS:  - Monitor WBC    Outcome: Progressing     Problem: SAFETY ADULT  Goal: Patient will remain free of falls  Description: INTERVENTIONS:  - Educate patient/family on patient safety including physical limitations  - Instruct patient to call for assistance with activity   - Consult OT/PT to assist with strengthening/mobility   - Keep Call bell within reach  - Keep bed low and locked with side rails adjusted as appropriate  - Keep care items and personal belongings within reach  - Initiate and maintain comfort rounds  - Make Fall Risk Sign visible to staff  - Offer Toileting every 2 Hours, in advance of need  - Initiate/Maintain bed alarm  - Obtain necessary fall risk management equipment  - Apply yellow socks and bracelet for high fall risk patients  - Consider moving patient to room near nurses station  Outcome: Progressing  Goal: Maintain or return to baseline ADL function  Description: INTERVENTIONS:  -  Assess patient's ability to carry out ADLs; assess patient's baseline for ADL function and identify physical deficits which impact ability to perform ADLs (bathing, care of mouth/teeth, toileting, grooming, dressing, etc )  - Assess/evaluate cause of self-care deficits   - Assess range of motion  - Assess patient's mobility; develop plan if impaired  - Assess patient's need for assistive devices and provide as appropriate  - Encourage maximum independence but intervene and supervise when necessary  - Involve family in performance of ADLs  - Assess for home care needs following discharge   - Consider OT consult to assist with ADL evaluation and planning for discharge  - Provide patient education as appropriate  Outcome: Progressing  Goal: Maintains/Returns to pre admission functional level  Description: INTERVENTIONS:  - Perform BMAT or MOVE assessment daily    - Set and communicate daily mobility goal to care team and patient/family/caregiver  - Collaborate with rehabilitation services on mobility goals if consulted  - Perform Range of Motion 3 times a day  - Reposition patient every 2 hours    - Dangle patient 3 times a day  - Stand patient 3 times a day  - Ambulate patient 3 times a day  - Out of bed to chair 3 times a day   - Out of bed for meals 3 times a day  - Out of bed for toileting  - Record patient progress and toleration of activity level   Outcome: Progressing     Problem: DISCHARGE PLANNING  Goal: Discharge to home or other facility with appropriate resources  Description: INTERVENTIONS:  - Identify barriers to discharge w/patient and caregiver  - Arrange for needed discharge resources and transportation as appropriate  - Identify discharge learning needs (meds, wound care, etc )  - Arrange for interpretive services to assist at discharge as needed  - Refer to Case Management Department for coordinating discharge planning if the patient needs post-hospital services based on physician/advanced practitioner order or complex needs related to functional status, cognitive ability, or social support system  Outcome: Progressing     Problem: Knowledge Deficit  Goal: Patient/family/caregiver demonstrates understanding of disease process, treatment plan, medications, and discharge instructions  Description: Complete learning assessment and assess knowledge base    Interventions:  - Provide teaching at level of understanding  - Provide teaching via preferred learning methods  Outcome: Progressing

## 2022-10-20 NOTE — PLAN OF CARE
Problem: OCCUPATIONAL THERAPY ADULT  Goal: Performs self-care activities at highest level of function for planned discharge setting  See evaluation for individualized goals  Description: Treatment Interventions: ADL retraining, Functional transfer training, Compensatory technique education     See flowsheet documentation for full assessment, interventions and recommendations  Note: Limitation: Decreased ADL status, Decreased self-care trans, Decreased high-level ADLs  Prognosis: Good  Assessment: Pt is a 56 y o  female seen for OT evaluation s/p admit to [facility] on 10/19/2022 w/ Laceration of left hand with infection  Comorbidities affecting pt's functional performance at time of assessment include: Anxiety, Ansomnia, Jaundice  Personal factors affecting pt at time of IE include:steps to enter environment and difficulty performing ADLS  Prior to admission, pt was I with ADLs  Upon evaluation: the following deficits impact occupational performance: decreased strength, decreased balance, decreased tolerance, increased pain and orthopedic restrictions  Pt to benefit from continued skilled OT tx while in the hospital to address deficits as defined above and maximize level of functional independence w ADL's and functional mobility  Occupational Performance areas to address include: bathing/shower, toilet hygiene, dressing, functional mobility and clothing management  From OT standpoint, recommendation at time of d/c would be Out-patient OT hand therapy       OT Discharge Recommendation: Home with outpatient rehabilitation (hand therapy)     Myrisa Manus [place]allas, MS, OTR/L

## 2022-10-20 NOTE — OCCUPATIONAL THERAPY NOTE
Occupational Therapy Evaluation      Keith Throckmorton    10/20/2022    Principal Problem:    Laceration of left hand with infection      Past Medical History:   Diagnosis Date    ADD (attention deficit disorder) 2019    Anxiety 05/31/2013    Herpes simplex     EXTERNAL    IBS (irritable bowel syndrome)     Insomnia     Jaundice     Malignant melanoma in situ ([address] )     Pneumonia     PONV (postoperative nausea and vomiting)     Reactive airway disease        Past Surgical History:   Procedure Laterality Date    AUGMENTATION BREAST      AUGMENTATION MAMMAPLASTY Bilateral 28years ago    WA KNEE SCOPE,MED/LAT MENISECTOMY Right 04/06/2021    Procedure: KNEE ARTHROSCOPY, medial lateral menisectomy,chondroplasty,synovectomy,inj;  Surgeon: Verner Heading, DO;  Location: [address] [place] [address] MAIN OR;  Service: Orthopedics    SKIN BIOPSY  2013    2 PUNCH BIOPSIES    SKIN LESION EXCISION Right 2013    LEG    TONSILLECTOMY      TUBAL LIGATION      WOUND DEBRIDEMENT Left 10/19/2022    Procedure: DEBRIDEMENT WOUND AND DRESSING CHANGE Michael JENKINS); Surgeon: Katie Childress MD;  Location: [facility] MAIN OR;  Service: Orthopedics        10/20/22 0946   OT Last Visit   OT Visit Date 10/20/22   Note Type   Note type Evaluation   Pain Assessment   Pain Assessment Tool 0-10   Pain Score 1   Pain Location/Orientation Orientation: Left  (w4rist)   Pain Onset/Description Onset: Ongoing;Frequency: Constant/Continuous; Descriptor: Discomfort   Patient's Stated Pain Goal No pain   Restrictions/Precautions   Weight Bearing Precautions Per Order Yes   LUE Weight Bearing Per Order (S)  NWB  (wrist distally)   Braces or Orthoses Sling  (prn, not donned upon arrival)   Other Precautions Multiple lines; Fall Risk;Pain   Home Living   Type of [address] [place] [address] One level;Performs ADLs on one level; Able to live on main level with bedroom/bathroom;Stairs to enter with rails  (5 [address]AN[address])   Bathroom Shower/Tub Tub/shower unit   Bathroom Toilet Standard   Bathroom Accessibility Accessible   Home Equipment   (no AD at baseline)   Prior Function   Level of [place] Independent with ADLs; Independent with functional mobility   Lives With Spouse   Receives Help From Family   IADLs Independent with driving; Independent with meal prep; Independent with medication management   Falls in the last 6 months 1 to 4  (1 fall)   Vocational Full time employment   ADL   UB Bathing Assistance 5  Supervision/Setup   LB Bathing Assistance 4  Minimal Assistance   [address]  [facility]  5  Supervision/Setup   Toileting Deficit Setup;Verbal cueing;Supervison/safety; Increased time to complete   Bed Mobility   Supine to Sit 6  Modified independent   Additional items HOB elevated; Increased time required   Sit to Supine 6  Modified independent   Additional items HOB elevated; Increased time required   Additional Comments Pt declined to sit OOB at this time   Transfers   Sit to Stand 5  Supervision   Additional items Assist x 1; Increased time required;Verbal cues   Stand to Sit 5  Supervision   Additional items Assist x 1; Increased time required;Verbal cues   Toilet transfer 5  Supervision   Additional items Assist x 1; Increased time required;Verbal cues   Functional Mobility   Functional Mobility 5  Supervision   Additional Comments A x1   Additional items   (no AD)   Balance   Static Sitting Normal   Dynamic Sitting Normal   Static Standing Good   Dynamic Standing Fair +   Ambulatory Fair +   Activity Tolerance   Activity Tolerance Patient tolerated treatment well   Nurse Made Aware DEB Parada   RUDELPHINE Assessment   RUE Assessment WFL   LUE Assessment   LUE Assessment X   LUE Strength   LUE Overall Strength Within Functional Limits - able to perform ADL tasks with strength  (maintained NWB L UE wrist distally)   Vision-Basic Assessment   Current Vision Wears glasses only for reading   Cognition   Overall Cognitive Status [facility] Arousal/Participation Alert; Cooperative   Attention Within functional limits   Orientation Level Oriented X4   Memory Within functional limits   Following Commands Follows all commands and directions without difficulty   Assessment   Limitation Decreased ADL status; Decreased self-care trans;Decreased high-level ADLs   Prognosis Good   Assessment Pt is a 56 y o  female seen for OT evaluation s/p admit to [facility] on 10/19/2022 w/ Laceration of left hand with infection  Comorbidities affecting pt's functional performance at time of assessment include: Anxiety, Ansomnia, Jaundice  Personal factors affecting pt at time of IE include:steps to enter environment and difficulty performing ADLS  Prior to admission, pt was I with ADLs  Upon evaluation: the following deficits impact occupational performance: decreased strength, decreased balance, decreased tolerance, increased pain and orthopedic restrictions  Pt to benefit from continued skilled OT tx while in the hospital to address deficits as defined above and maximize level of functional independence w ADL's and functional mobility  Occupational Performance areas to address include: bathing/shower, toilet hygiene, dressing, functional mobility and clothing management  From OT standpoint, recommendation at time of d/c would be Out-patient OT hand therapy  Goals   Patient Goals To go to the bathroom  (completed during session)   Plan   Treatment Interventions ADL retraining;Functional transfer training; Compensatory technique education   Goal Expiration Date 10/30/22   OT Treatment Day 0   OT Frequency 3-5x/wk   Recommendation   OT Discharge Recommendation Home with outpatient rehabilitation  (hand therapy)   Additional Comments  The patient's raw score on the AM-PAC Daily Activity inpatient short form is 20, standardized score is 42 03, greater than 39 4  Patients at this level are likely to benefit from discharge to home   Please refer to the recommendation of the Occupational Therapist for safe discharge planning     AM-PAC Daily Activity Inpatient   Lower Body Dressing 3   Bathing 3   Toileting 3   Upper Body Dressing 3   Grooming 4   Eating 4   Daily Activity Raw Score 20   Daily Activity Standardized Score (Calc for Raw Score >=11) 42 03   AM-PAC Applied Cognition Inpatient   Following a Speech/Presentation 4   Understanding Ordinary Conversation 4   Taking Medications 4   Remembering Where Things Are Placed or Put Away 4   Remembering List of 4-5 Errands 4   Taking Care of Complicated Tasks 4   Applied Cognition Raw Score 24   Applied Cognition Standardized Score 62 21     GOALS:    Pt will achieve the following within specified time frame: STG  Pt will achieve the following goals within 5 days    *ADL transfers with (I) for inc'd independence with ADLs/purposeful tasks    *UB ADL with (I) for inc'd independence with self cares    *LB ADL with (I) using AE prn for inc'd independence with self cares    *Toileting with (I) for clothing management and hygiene for return to PLOF with personal care    *Increase static stand balance to Normal and dyn stand balance to Good for inc'd safety with standing purposeful tasks    *Increase stand tolerance x5 m for inc'd tolerance with standing purposeful tasks    *Participate in 10m UE therex to increase overall stamina/activity tolerance for purposeful tasks    *Bed mobility- (I) for inc'd independence to manage own comfort and initiate EOB & OOB purposeful tasks    Pt will achieve the following within specified time frame: LTG  Pt will achieve the following goals within 10 days    *Increase stand tolerance x7 m for inc'd tolerance with standing purposeful tasks      Camille Donahue MS, OTR/L

## 2022-10-21 LAB
ANION GAP SERPL CALCULATED.3IONS-SCNC: 4 MMOL/L (ref 4–13)
BACTERIA SPEC ANAEROBE CULT: ABNORMAL
BASOPHILS # BLD AUTO: 0.06 THOUSANDS/ÂΜL (ref 0–0.1)
BASOPHILS NFR BLD AUTO: 1 % (ref 0–1)
BUN SERPL-MCNC: 19 MG/DL (ref 5–25)
CALCIUM SERPL-MCNC: 8.9 MG/DL (ref 8.4–10.2)
CHLORIDE SERPL-SCNC: 104 MMOL/L (ref 96–108)
CO2 SERPL-SCNC: 29 MMOL/L (ref 21–32)
CREAT SERPL-MCNC: 0.73 MG/DL (ref 0.6–1.3)
EOSINOPHIL # BLD AUTO: 0.11 THOUSAND/ÂΜL (ref 0–0.61)
EOSINOPHIL NFR BLD AUTO: 1 % (ref 0–6)
ERYTHROCYTE [DISTWIDTH] IN BLOOD BY AUTOMATED COUNT: 13 % (ref 11.6–15.1)
GFR SERPL CREATININE-BSD FRML MDRD: 93 ML/MIN/1.73SQ M
GLUCOSE SERPL-MCNC: 86 MG/DL (ref 65–140)
HCT VFR BLD AUTO: 35.5 % (ref 34.8–46.1)
HGB BLD-MCNC: 11.8 G/DL (ref 11.5–15.4)
IMM GRANULOCYTES # BLD AUTO: 0.04 THOUSAND/UL (ref 0–0.2)
IMM GRANULOCYTES NFR BLD AUTO: 0 % (ref 0–2)
LYMPHOCYTES # BLD AUTO: 3.48 THOUSANDS/ÂΜL (ref 0.6–4.47)
LYMPHOCYTES NFR BLD AUTO: 37 % (ref 14–44)
MCH RBC QN AUTO: 30.3 PG (ref 26.8–34.3)
MCHC RBC AUTO-ENTMCNC: 33.2 G/DL (ref 31.4–37.4)
MCV RBC AUTO: 91 FL (ref 82–98)
MONOCYTES # BLD AUTO: 0.7 THOUSAND/ÂΜL (ref 0.17–1.22)
MONOCYTES NFR BLD AUTO: 8 % (ref 4–12)
MRSA NOSE QL CULT: NORMAL
NEUTROPHILS # BLD AUTO: 4.93 THOUSANDS/ÂΜL (ref 1.85–7.62)
NEUTS SEG NFR BLD AUTO: 53 % (ref 43–75)
NRBC BLD AUTO-RTO: 0 /100 WBCS
PLATELET # BLD AUTO: 267 THOUSANDS/UL (ref 149–390)
PMV BLD AUTO: 10.1 FL (ref 8.9–12.7)
POTASSIUM SERPL-SCNC: 4.4 MMOL/L (ref 3.5–5.3)
RBC # BLD AUTO: 3.89 MILLION/UL (ref 3.81–5.12)
SODIUM SERPL-SCNC: 137 MMOL/L (ref 135–147)
VANCOMYCIN TROUGH SERPL-MCNC: 12.5 UG/ML (ref 10–20)
WBC # BLD AUTO: 9.32 THOUSAND/UL (ref 4.31–10.16)

## 2022-10-21 PROCEDURE — 99232 SBSQ HOSP IP/OBS MODERATE 35: CPT | Performed by: INTERNAL MEDICINE

## 2022-10-21 PROCEDURE — 80048 BASIC METABOLIC PNL TOTAL CA: CPT | Performed by: INTERNAL MEDICINE

## 2022-10-21 PROCEDURE — 85025 COMPLETE CBC W/AUTO DIFF WBC: CPT | Performed by: ORTHOPAEDIC SURGERY

## 2022-10-21 PROCEDURE — 80202 ASSAY OF VANCOMYCIN: CPT | Performed by: INTERNAL MEDICINE

## 2022-10-21 PROCEDURE — 99232 SBSQ HOSP IP/OBS MODERATE 35: CPT | Performed by: PHYSICIAN ASSISTANT

## 2022-10-21 RX ORDER — OXYCODONE HYDROCHLORIDE 10 MG/1
10 TABLET ORAL EVERY 8 HOURS PRN
Status: DISCONTINUED | OUTPATIENT
Start: 2022-10-21 | End: 2022-10-24 | Stop reason: HOSPADM

## 2022-10-21 RX ADMIN — OXYCODONE HYDROCHLORIDE 10 MG: 10 TABLET ORAL at 07:11

## 2022-10-21 RX ADMIN — ACETAMINOPHEN 975 MG: 325 TABLET ORAL at 19:21

## 2022-10-21 RX ADMIN — OXYCODONE HYDROCHLORIDE 5 MG: 5 TABLET ORAL at 15:26

## 2022-10-21 RX ADMIN — OXYCODONE HYDROCHLORIDE 10 MG: 10 TABLET ORAL at 02:07

## 2022-10-21 RX ADMIN — PIPERACILLIN AND TAZOBACTAM 3.38 G: 3; .375 INJECTION, POWDER, FOR SOLUTION INTRAVENOUS at 01:28

## 2022-10-21 RX ADMIN — PIPERACILLIN AND TAZOBACTAM 3.38 G: 3; .375 INJECTION, POWDER, FOR SOLUTION INTRAVENOUS at 07:11

## 2022-10-21 RX ADMIN — ESCITALOPRAM OXALATE 20 MG: 10 TABLET ORAL at 08:24

## 2022-10-21 RX ADMIN — VANCOMYCIN HYDROCHLORIDE 750 MG: 750 INJECTION, SOLUTION INTRAVENOUS at 08:31

## 2022-10-21 RX ADMIN — OXYCODONE HYDROCHLORIDE 5 MG: 5 TABLET ORAL at 19:21

## 2022-10-21 RX ADMIN — PIPERACILLIN AND TAZOBACTAM 3.38 G: 3; .375 INJECTION, POWDER, FOR SOLUTION INTRAVENOUS at 13:15

## 2022-10-21 RX ADMIN — ACETAMINOPHEN 975 MG: 325 TABLET ORAL at 03:44

## 2022-10-21 RX ADMIN — ACETAMINOPHEN 975 MG: 325 TABLET ORAL at 12:03

## 2022-10-21 RX ADMIN — DOCUSATE SODIUM 100 MG: 100 CAPSULE, LIQUID FILLED ORAL at 17:04

## 2022-10-21 RX ADMIN — MORPHINE SULFATE 2 MG: 2 INJECTION, SOLUTION INTRAMUSCULAR; INTRAVENOUS at 08:11

## 2022-10-21 RX ADMIN — ZOLPIDEM TARTRATE 5 MG: 5 TABLET ORAL at 22:48

## 2022-10-21 RX ADMIN — PIPERACILLIN AND TAZOBACTAM 3.38 G: 3; .375 INJECTION, POWDER, FOR SOLUTION INTRAVENOUS at 19:22

## 2022-10-21 RX ADMIN — DOCUSATE SODIUM 100 MG: 100 CAPSULE, LIQUID FILLED ORAL at 08:24

## 2022-10-21 RX ADMIN — OXYCODONE HYDROCHLORIDE 10 MG: 10 TABLET ORAL at 11:31

## 2022-10-21 NOTE — CASE MANAGEMENT
Case Management Discharge Planning Note    Patient name Skye Vu  Location [address] [phone] MRN [id number]  : 1965 Date 10/21/2022       Current Admission Date: 10/19/2022  Current Admission Diagnosis:Laceration of left hand with infection   Patient Active Problem List    Diagnosis Date Noted   • Cellulitis of left hand 10/19/2022   • Laceration of left hand with infection 10/19/2022   • Numbness and tingling 2022   • Depression, recurrent ([facility] [address] ) 2022   • Tinea unguium 2022   • Tear of medial meniscus of right knee 2021   • Chronic pain of right knee 2021   • History of COVID-19 2020   • Other hyperlipidemia 10/17/2019   • Adult BMI 25 0-25 9 kg/sq m 10/17/2019   • Current smoker 10/17/2019   • Attention deficit hyperactivity disorder (ADHD), combined type 06/15/2019   • Elevated BP without diagnosis of hypertension 10/23/2018   • Major depression, single episode 2016   • Malignant melanoma of skin of trunk ([facility] [address] ) 2014   • Insomnia 2013   • Irritable bowel syndrome 2013   • Anxiety 2013      LOS (days): 2  Geometric Mean LOS (GMLOS) (days):   Days to GMLOS:     OBJECTIVE:  Risk of Unplanned Readmission Score: 5 53         Current admission status: Inpatient   Preferred Pharmacy:   [facility] [address] [address]  Phone: [phone] Fax: [phone]    [id number]  [address] [place] [address]  Phone: [phone] Fax: [phone]    Primary Care Provider: Joslyn Junior PA-C    Primary Insurance: BLUE CROSS  Secondary Insurance:     DISCHARGE DETAILS:    Discharge planning discussed with[de-identified] patient  Freedom of Choice: Yes  Comments - Freedom of Choice: pt denies any d/c needs-   pt may need a picc line depending on cultures and ID recommendation                                         Treatment Team Recommendation: Home (home with spouse pt may need picc line-spouse)

## 2022-10-21 NOTE — PROGRESS NOTES
Subjective: No acute events overnight  No acute distress  Cultures results still pending  Pt with moderate pain  No numbness  For dressing change today  Objective:  A 10 point ROS was performed; negative except as noted above  Wound is relatively dry  There is swelling of the most radial side of the laceration  With mild pressure there is spontaneous fluid eruption more serous but slightly cloudy looking of a proximally signs of a pen tip 2 places  Patient continues with some discomfort with range of motion of the fingers  There is no lymphangitic streaking  There is no significant erythema lauren laceration  Dorsum of the hand notes resolved swelling capillary refill is brisk  Lab Results   Component Value Date/Time    WBC 9 32 10/21/2022 06:51 AM    HGB 11 8 10/21/2022 06:51 AM     Vitals:    10/21/22 0734   BP: 108/69   Pulse: 60   Resp: 19   Temp:    SpO2: 96%     Left upper extremity  Dressing C/D/I  Motor grossly intact to median, radial and ulnar nerve distributions  Sensation intact to light touch in m/r/u/mc/ax distributions  Digits warm and well perfused with brisk capillary refill    After discussion with Dr Elena Castillo decision was made to remove the most radial suture in hopes of spontaneous drainage slight fluid reaccumulation  Bulky dressing was then applied  Patient required morphine to have suture removed under sterile technique with 11 blade scalpel  Betadine ointment and dry sterile dressing applied  Assessment: 56 y o  female post op day #2 from Left hand I&D of palmar laceration  Plan:  NWB on left palm  Pain control  DVT ppx: Foot Pumps  Will continue to assess for acute blood loss anemia  Dispo: Ortho will follow   Waiting final cultures decisions on antibiotics per Infectious Disease  Patient may need PICC line  Doubt discharge today  Continue ice and strict elevation and no further IV pain meds

## 2022-10-21 NOTE — NURSING NOTE
dsg change done this am by ortho, premed given and med also given during dt pt having increase pain, pt in bed at this time resting

## 2022-10-21 NOTE — PLAN OF CARE
Problem: PAIN - ADULT  Goal: Verbalizes/displays adequate comfort level or baseline comfort level  Description: Interventions:  - Encourage patient to monitor pain and request assistance  - Assess pain using appropriate pain scale  - Administer analgesics based on type and severity of pain and evaluate response  - Implement non-pharmacological measures as appropriate and evaluate response  - Consider cultural and social influences on pain and pain management  - Notify physician/advanced practitioner if interventions unsuccessful or patient reports new pain  Outcome: Progressing     Problem: INFECTION - ADULT  Goal: Absence or prevention of progression during hospitalization  Description: INTERVENTIONS:  - Assess and monitor for signs and symptoms of infection  - Monitor lab/diagnostic results  - Monitor all insertion sites, i e  indwelling lines, tubes, and drains  - Monitor endotracheal if appropriate and nasal secretions for changes in amount and color  - [place] appropriate cooling/warming therapies per order  - Administer medications as ordered  - Instruct and encourage patient and family to use good hand hygiene technique  - Identify and instruct in appropriate isolation precautions for identified infection/condition  Outcome: Progressing  Goal: Absence of fever/infection during neutropenic period  Description: INTERVENTIONS:  - Monitor WBC    Outcome: Progressing     Problem: SAFETY ADULT  Goal: Patient will remain free of falls  Description: INTERVENTIONS:  - Educate patient/family on patient safety including physical limitations  - Instruct patient to call for assistance with activity   - Consult OT/PT to assist with strengthening/mobility   - Keep Call bell within reach  - Keep bed low and locked with side rails adjusted as appropriate  - Keep care items and personal belongings within reach  - Initiate and maintain comfort rounds  - Make Fall Risk Sign visible to staff  - Offer Toileting every 1 Hours, in advance of need  - Initiate/Maintain bed alarm  - Obtain necessary fall risk management equipment: bed   - Apply yellow socks and bracelet for high fall risk patients  - Consider moving patient to room near nurses station  Outcome: Progressing  Goal: Maintain or return to baseline ADL function  Description: INTERVENTIONS:  -  Assess patient's ability to carry out ADLs; assess patient's baseline for ADL function and identify physical deficits which impact ability to perform ADLs (bathing, care of mouth/teeth, toileting, grooming, dressing, etc )  - Assess/evaluate cause of self-care deficits   - Assess range of motion  - Assess patient's mobility; develop plan if impaired  - Assess patient's need for assistive devices and provide as appropriate  - Encourage maximum independence but intervene and supervise when necessary  - Involve family in performance of ADLs  - Assess for home care needs following discharge   - Consider OT consult to assist with ADL evaluation and planning for discharge  - Provide patient education as appropriate  Outcome: Progressing  Goal: Maintains/Returns to pre admission functional level  Description: INTERVENTIONS:  - Perform BMAT or MOVE assessment daily    - Set and communicate daily mobility goal to care team and patient/family/caregiver  - Collaborate with rehabilitation services on mobility goals if consulted  - Perform Range of Motion 3 times a day  - Reposition patient every 2 hours    - Dangle patient 3 times a day  - Stand patient 3 times a day  - Ambulate patient 3 times a day  - Out of bed to chair 3 times a day   - Out of bed for meals 3 times a day  - Out of bed for toileting  - Record patient progress and toleration of activity level   Outcome: Progressing     Problem: DISCHARGE PLANNING  Goal: Discharge to home or other facility with appropriate resources  Description: INTERVENTIONS:  - Identify barriers to discharge w/patient and caregiver  - Arrange for needed discharge resources and transportation as appropriate  - Identify discharge learning needs (meds, wound care, etc )  - Arrange for interpretive services to assist at discharge as needed  - Refer to Case Management Department for coordinating discharge planning if the patient needs post-hospital services based on physician/advanced practitioner order or complex needs related to functional status, cognitive ability, or social support system  Outcome: Progressing     Problem: Knowledge Deficit  Goal: Patient/family/caregiver demonstrates understanding of disease process, treatment plan, medications, and discharge instructions  Description: Complete learning assessment and assess knowledge base  Interventions:  - Provide teaching at level of understanding  - Provide teaching via preferred learning methods  Outcome: Progressing     Problem: MOBILITY - ADULT  Goal: Maintain or return to baseline ADL function  Description: INTERVENTIONS:  -  Assess patient's ability to carry out ADLs; assess patient's baseline for ADL function and identify physical deficits which impact ability to perform ADLs (bathing, care of mouth/teeth, toileting, grooming, dressing, etc )  - Assess/evaluate cause of self-care deficits   - Assess range of motion  - Assess patient's mobility; develop plan if impaired  - Assess patient's need for assistive devices and provide as appropriate  - Encourage maximum independence but intervene and supervise when necessary  - Involve family in performance of ADLs  - Assess for home care needs following discharge   - Consider OT consult to assist with ADL evaluation and planning for discharge  - Provide patient education as appropriate  Outcome: Progressing  Goal: Maintains/Returns to pre admission functional level  Description: INTERVENTIONS:  - Perform BMAT or MOVE assessment daily    - Set and communicate daily mobility goal to care team and patient/family/caregiver     - Collaborate with rehabilitation services on mobility goals if consulted  - Perform Range of Motion 3 times a day  - Reposition patient every 2 hours    - Dangle patient 3 times a day  - Stand patient 3 times a day  - Ambulate patient 3 times a day  - Out of bed to chair 3 times a day   - Out of bed for meals 3 times a day  - Out of bed for toileting  - Record patient progress and toleration of activity level   Outcome: Progressing

## 2022-10-21 NOTE — PROGRESS NOTES
Progress Note - Infectious Disease   Mariana Adrian 56 y o  female MRN: [id number]  Unit/Bed#: -01 Encounter: [id number]      Impression/Recommendations:  1  Necrotizing cellulitis of left hand  Patient is status post I&D with findings of necrotic tissue but purulence mostly tracking along fascia with only slight penetration through fascia  Patient is otherwise clinically and systemically well  Patient had fever/chills at home but no fever here  WBC is normal   She is hemodynamically stable, without hypotension      Given that patient sustained laceration with exposure to contaminated floor, we have to consider the possibilities of environmental pathogens, in addition to the usual skin pathogens in her infection  Operative culture is growing GNR, likely environmental GNR      Continue Zosyn for now  No further need for vancomycin  Serial hand exams  Monitor temperature/WBC  Follow-up on final operative culture  Adjust antibiotic per operative culture result  Hopefully, if patient does well, we can transition to p o  antibiotic in a few days      2  Left hand laceration, secondary to fall, with exposure to contaminated angela  Patient is status post I&D  Patient's last TD booster was 7 years ago  Given severity of injury, patient was given TD booster  Wound care per Orthopedic surgery      Discussed with patient and her  in detail regarding the above plan  Antibiotics:  Vancomycin/Zosyn  POD # 2    Subjective:  Patient still has significant pain in the left hand, at dressing change  Otherwise, she feels well  Temperature stays down  No chills  She is tolerating antibiotics well  No nausea, vomiting or diarrhea      Objective:  Vitals:  Temp:  [97 5 °F (36 4 °C)-98 °F (36 7 °C)] 97 5 °F (36 4 °C)  HR:  [60-74] 60  Resp:  [15-19] 19  BP: ()/(56-69) 108/69  SpO2:  [95 %-97 %] 96 %  Temp (24hrs), Av 7 °F (36 5 °C), Min:97 5 °F (36 4 °C), Max:98 °F (36 7 °C)  Current: Temperature: 97 5 °F (36 4 °C)    Physical Exam:     General: Awake, alert, cooperative, no distress  Neck:  Supple  No mass  No lymphadenopathy  Lungs: Expansion symmetric, no rales, no wheezing, respirations unlabored  Heart:  Regular rate and rhythm, S1 and S2 normal, no murmur  Abdomen: Soft, nondistended, non-tender, bowel sounds active all four quadrants, no masses, no organomegaly  Extremities: Improved hand edema  Left hand with dressing in place  Dressing is dry  No erythema/warmth beyond dressing  Slight improvement in tenderness  Skin:  No rash  Neuro: Moves all extremities  Invasive Devices  Report    Peripheral Intravenous Line  Duration           Peripheral IV 10/19/22 Right Antecubital 1 day                Labs studies:   I have personally reviewed pertinent labs  Results from last 7 days   Lab Units 10/21/22  0651 10/19/22  1700   POTASSIUM mmol/L 4 4 4 3   CHLORIDE mmol/L 104 100   CO2 mmol/L 29 27   BUN mg/dL 19 13   CREATININE mg/dL 0 73 0 85   EGFR ml/min/1 73sq m 93 77   CALCIUM mg/dL 8 9 9 8     Results from last 7 days   Lab Units 10/21/22  0651 10/19/22  1700   WBC Thousand/uL 9 32 8 56   HEMOGLOBIN g/dL 11 8 12 8   PLATELETS Thousands/uL 267 298     Results from last 7 days   Lab Units 10/19/22  1819   GRAM STAIN RESULT  No Polys or Bacteria seen   WOUND CULTURE  2+ Growth of Gram Negative Ian*       Imaging Studies:   I have personally reviewed pertinent imaging study reports and images in PACS  EKG, Pathology, and Other Studies:   I have personally reviewed pertinent reports

## 2022-10-21 NOTE — PROGRESS NOTES
Vancomycin Assessment    Jonathan Ruiz is a 56 y o  female who is currently receiving vancomycin 1000 mg IV Q12H for skin-soft tissue infection     Relevant clinical data and objective history reviewed:  Creatinine   Date Value Ref Range Status   10/21/2022 0 73 0 60 - 1 30 mg/dL Final     Comment:     Standardized to IDMS reference method   10/19/2022 0 85 0 60 - 1 30 mg/dL Final     Comment:     Standardized to IDMS reference method   10/04/2022 0 78 0 60 - 1 30 mg/dL Final     Comment:     Standardized to IDMS reference method     /69   Pulse 60   Temp 97 5 °F (36 4 °C)   Resp 19   LMP 02/01/2021   SpO2 96%   I/O last 3 completed shifts: In: 400 [IV Piggyback:400]  Out: -   Lab Results   Component Value Date/Time    BUN 19 10/21/2022 06:51 AM    WBC 9 32 10/21/2022 06:51 AM    HGB 11 8 10/21/2022 06:51 AM    HCT 35 5 10/21/2022 06:51 AM    MCV 91 10/21/2022 06:51 AM     10/21/2022 06:51 AM     Temp Readings from Last 3 Encounters:   10/21/22 97 5 °F (36 4 °C)   10/19/22 98 6 °F (37 °C)   09/28/22 97 8 °F (36 6 °C)     Vancomycin Days of Therapy: 3    Assessment/Plan  The patient is currently on vancomycin utilizing scheduled dosing  Baseline risks associated with therapy include: concomitant nephrotoxic medications, advanced age, and dehydration  The patient is receiving 1000 mg IV Q12H with the most recent vancomycin level being at steady-state and sub-therapeutic based on a goal of 15-20 (appropriate for most indications) ; therefore, after clinical evaluation will be changed to 750mg IV Q8H   Pharmacy will continue to follow closely for s/sx of nephrotoxicity, infusion reactions, and appropriateness of therapy  BMP and CBC will be ordered per protocol  Plan for trough as patient approaches steady state, prior to the 4th  dose at approximately 0730 on 10/22/2022  Pharmacy will continue to follow the patient’s culture results and clinical progress daily      Shayne Hinton Pharmacist

## 2022-10-22 PROBLEM — L53.9 ERYTHEMA OF SKIN: Status: ACTIVE | Noted: 2022-10-22

## 2022-10-22 LAB
BACTERIA WND AEROBE CULT: ABNORMAL
BACTERIA WND AEROBE CULT: ABNORMAL
GRAM STN SPEC: ABNORMAL

## 2022-10-22 PROCEDURE — 99252 IP/OBS CONSLTJ NEW/EST SF 35: CPT | Performed by: NURSE PRACTITIONER

## 2022-10-22 PROCEDURE — 99232 SBSQ HOSP IP/OBS MODERATE 35: CPT | Performed by: ORTHOPAEDIC SURGERY

## 2022-10-22 RX ADMIN — ZOLPIDEM TARTRATE 5 MG: 5 TABLET ORAL at 20:51

## 2022-10-22 RX ADMIN — OXYCODONE HYDROCHLORIDE 5 MG: 5 TABLET ORAL at 00:30

## 2022-10-22 RX ADMIN — OXYCODONE HYDROCHLORIDE 5 MG: 5 TABLET ORAL at 16:45

## 2022-10-22 RX ADMIN — PIPERACILLIN AND TAZOBACTAM 3.38 G: 3; .375 INJECTION, POWDER, FOR SOLUTION INTRAVENOUS at 01:35

## 2022-10-22 RX ADMIN — DOCUSATE SODIUM 100 MG: 100 CAPSULE, LIQUID FILLED ORAL at 08:04

## 2022-10-22 RX ADMIN — PIPERACILLIN AND TAZOBACTAM 3.38 G: 3; .375 INJECTION, POWDER, FOR SOLUTION INTRAVENOUS at 08:04

## 2022-10-22 RX ADMIN — ACETAMINOPHEN 975 MG: 325 TABLET ORAL at 19:54

## 2022-10-22 RX ADMIN — DOCUSATE SODIUM 100 MG: 100 CAPSULE, LIQUID FILLED ORAL at 17:14

## 2022-10-22 RX ADMIN — OXYCODONE HYDROCHLORIDE 5 MG: 5 TABLET ORAL at 12:41

## 2022-10-22 RX ADMIN — OXYCODONE HYDROCHLORIDE 5 MG: 5 TABLET ORAL at 08:33

## 2022-10-22 RX ADMIN — PIPERACILLIN AND TAZOBACTAM 3.38 G: 3; .375 INJECTION, POWDER, FOR SOLUTION INTRAVENOUS at 19:55

## 2022-10-22 RX ADMIN — ESCITALOPRAM OXALATE 20 MG: 10 TABLET ORAL at 08:03

## 2022-10-22 RX ADMIN — PIPERACILLIN AND TAZOBACTAM 3.38 G: 3; .375 INJECTION, POWDER, FOR SOLUTION INTRAVENOUS at 13:18

## 2022-10-22 RX ADMIN — OXYCODONE HYDROCHLORIDE 5 MG: 5 TABLET ORAL at 20:51

## 2022-10-22 RX ADMIN — ACETAMINOPHEN 975 MG: 325 TABLET ORAL at 04:04

## 2022-10-22 RX ADMIN — ACETAMINOPHEN 975 MG: 325 TABLET ORAL at 11:45

## 2022-10-22 RX ADMIN — OXYCODONE HYDROCHLORIDE 5 MG: 5 TABLET ORAL at 04:26

## 2022-10-22 NOTE — PLAN OF CARE
Problem: PAIN - ADULT  Goal: Verbalizes/displays adequate comfort level or baseline comfort level  Description: Interventions:  - Encourage patient to monitor pain and request assistance  - Assess pain using appropriate pain scale  - Administer analgesics based on type and severity of pain and evaluate response  - Implement non-pharmacological measures as appropriate and evaluate response  - Consider cultural and social influences on pain and pain management  - Notify physician/advanced practitioner if interventions unsuccessful or patient reports new pain  Outcome: Progressing     Problem: INFECTION - ADULT  Goal: Absence or prevention of progression during hospitalization  Description: INTERVENTIONS:  - Assess and monitor for signs and symptoms of infection  - Monitor lab/diagnostic results  - Monitor all insertion sites, i e  indwelling lines, tubes, and drains  - Monitor endotracheal if appropriate and nasal secretions for changes in amount and color  - [place] appropriate cooling/warming therapies per order  - Administer medications as ordered  - Instruct and encourage patient and family to use good hand hygiene technique  - Identify and instruct in appropriate isolation precautions for identified infection/condition  Outcome: Progressing  Goal: Absence of fever/infection during neutropenic period  Description: INTERVENTIONS:  - Monitor WBC    Outcome: Progressing     Problem: SAFETY ADULT  Goal: Patient will remain free of falls  Description: INTERVENTIONS:  - Educate patient/family on patient safety including physical limitations  - Instruct patient to call for assistance with activity   - Consult OT/PT to assist with strengthening/mobility   - Keep Call bell within reach  - Keep bed low and locked with side rails adjusted as appropriate  - Keep care items and personal belongings within reach  - Initiate and maintain comfort rounds  - Make Fall Risk Sign visible to staff  - Offer Toileting every 1 Hours, in advance of need  - Initiate/Maintain alarm  - Obtain necessary fall risk management equipment:   - Apply yellow socks and bracelet for high fall risk patients  - Consider moving patient to room near nurses station  Outcome: Progressing  Goal: Maintain or return to baseline ADL function  Description: INTERVENTIONS:  -  Assess patient's ability to carry out ADLs; assess patient's baseline for ADL function and identify physical deficits which impact ability to perform ADLs (bathing, care of mouth/teeth, toileting, grooming, dressing, etc )  - Assess/evaluate cause of self-care deficits   - Assess range of motion  - Assess patient's mobility; develop plan if impaired  - Assess patient's need for assistive devices and provide as appropriate  - Encourage maximum independence but intervene and supervise when necessary  - Involve family in performance of ADLs  - Assess for home care needs following discharge   - Consider OT consult to assist with ADL evaluation and planning for discharge  - Provide patient education as appropriate  Outcome: Progressing  Goal: Maintains/Returns to pre admission functional level  Description: INTERVENTIONS:  - Perform BMAT or MOVE assessment daily    - Set and communicate daily mobility goal to care team and patient/family/caregiver  - Collaborate with rehabilitation services on mobility goals if consulted  - Perform Range of Motion 3 times a day  - Reposition patient every 2 hours    - Dangle patient 3 times a day  - Stand patient 3 times a day  - Ambulate patient 3 times a day  - Out of bed to chair 3 times a day   - Out of bed for meals 3 times a day  - Out of bed for toileting  - Record patient progress and toleration of activity level   Outcome: Progressing     Problem: DISCHARGE PLANNING  Goal: Discharge to home or other facility with appropriate resources  Description: INTERVENTIONS:  - Identify barriers to discharge w/patient and caregiver  - Arrange for needed discharge resources and transportation as appropriate  - Identify discharge learning needs (meds, wound care, etc )  - Arrange for interpretive services to assist at discharge as needed  - Refer to Case Management Department for coordinating discharge planning if the patient needs post-hospital services based on physician/advanced practitioner order or complex needs related to functional status, cognitive ability, or social support system  Outcome: Progressing     Problem: Knowledge Deficit  Goal: Patient/family/caregiver demonstrates understanding of disease process, treatment plan, medications, and discharge instructions  Description: Complete learning assessment and assess knowledge base  Interventions:  - Provide teaching at level of understanding  - Provide teaching via preferred learning methods  Outcome: Progressing     Problem: MOBILITY - ADULT  Goal: Maintain or return to baseline ADL function  Description: INTERVENTIONS:  -  Assess patient's ability to carry out ADLs; assess patient's baseline for ADL function and identify physical deficits which impact ability to perform ADLs (bathing, care of mouth/teeth, toileting, grooming, dressing, etc )  - Assess/evaluate cause of self-care deficits   - Assess range of motion  - Assess patient's mobility; develop plan if impaired  - Assess patient's need for assistive devices and provide as appropriate  - Encourage maximum independence but intervene and supervise when necessary  - Involve family in performance of ADLs  - Assess for home care needs following discharge   - Consider OT consult to assist with ADL evaluation and planning for discharge  - Provide patient education as appropriate  Outcome: Progressing  Goal: Maintains/Returns to pre admission functional level  Description: INTERVENTIONS:  - Perform BMAT or MOVE assessment daily    - Set and communicate daily mobility goal to care team and patient/family/caregiver     - Collaborate with rehabilitation services on mobility goals if consulted  - Perform Range of Motion 3 times a day  - Reposition patient every 2 hours    - Dangle patient 3 times a day  - Stand patient 3 times a day  - Ambulate patient 3 times a day  - Out of bed to chair 3 times a day   - Out of bed for meals 3 times a day  - Out of bed for toileting  - Record patient progress and toleration of activity level   Outcome: Progressing

## 2022-10-22 NOTE — NURSING NOTE
Ortho pa in to see and change pt dressing this am,pt tolerated well, pain controlled with the meds ordered, pt presently in bed at this time in no acute distress watching tv

## 2022-10-22 NOTE — CONSULTS
Jose Breen 1965, 56 y o  female MRN: [id number]  Unit/Bed#: -01 Encounter: [id number]  Primary Care Provider: Starla Kurtz PA-C   Date and time admitted to hospital: 10/19/2022  3:39 PM    Inpatient consult to Internal Medicine  Consult performed by: TYLER Zee  Consult ordered by: Volodymyr Steel MD          Erythema of skin  Assessment & Plan  · Right upper arm/antecubital area erythema and edema   · Suspected that this is related IV infiltration; less likely cellulitis  · IV site was removed  · Continue with warm compress, elevate right arm   · Monitor closely   · Continue with iv zosyn  · If not improving will follow up with venous duplex of upper extremity to rule out for possible DVT    * Laceration of left hand with infection  Assessment & Plan  · POD#3 for left hand I&D  · Continue with IV zosyn per ID recommendation; wound cultures show A hydrophila, E cloacae, and C sporogenes  · Local wound care per orthopedics      Recommendations for Discharge:  · Per Primary Service    Counseling / Coordination of Care Time: 30 minutes  Greater than 50% of total time spent on patient counseling and coordination of care  History of Present Illness:  Aggie Bedoya is a 56 y o  female who is originally admitted to the orthopedics service due to left hand I&D  We are consulted for right upper extremity erythema and swelling  The patient presented worsening left hand infection  She had injure her hand a few days prior to admission which was sutured and started on antibiotics  She presented to the ED with worsening pain, increasing drainage from the wound, extensive cellulitis with fevers and chills and subsequently was admitted  She underwent an I&D by orthopedic surgeries  Infectious Disease is on consult for IV antibiotics recommendation    According to nursing staff, the patient was noticed to have redness above the IV site on the right antecubital  The site appears to have worsening erythema and warmth to touch with increasing pain with movement of her right elbow  The patient denies any weakness of the right arm, numbness or tingling  Review of Systems:  Review of Systems   Constitutional: Negative for activity change, appetite change, chills, diaphoresis and fever  HENT: Negative for congestion, ear pain, hearing loss, tinnitus and trouble swallowing  Eyes: Negative for photophobia, pain, discharge, itching and visual disturbance  Respiratory: Negative for cough, shortness of breath, wheezing and stridor  Cardiovascular: Negative for chest pain, palpitations and leg swelling  Gastrointestinal: Negative for abdominal pain, blood in stool, constipation, diarrhea, nausea and vomiting  Endocrine: Negative for cold intolerance, heat intolerance, polydipsia, polyphagia and polyuria  Genitourinary: Negative for difficulty urinating, dysuria, frequency, hematuria and urgency  Musculoskeletal: Negative for back pain, gait problem and neck stiffness  Skin: Positive for color change (right antecubital)  Negative for pallor, rash and wound  Allergic/Immunologic: Negative for environmental allergies, food allergies and immunocompromised state  Neurological: Negative for dizziness, tremors, speech difficulty, weakness, light-headedness, numbness and headaches  Hematological: Negative for adenopathy  Does not bruise/bleed easily  Psychiatric/Behavioral: Negative for confusion, hallucinations and sleep disturbance         Past Medical and Surgical History:   Past Medical History:   Diagnosis Date   • ADD (attention deficit disorder) 2019   • Anxiety 05/31/2013   • Herpes simplex     EXTERNAL   • IBS (irritable bowel syndrome)    • Insomnia    • Jaundice    • Malignant melanoma in situ (HCC)    • Pneumonia    • PONV (postoperative nausea and vomiting)    • Reactive airway disease        Past Surgical History:   Procedure Laterality Date   • AUGMENTATION BREAST     • AUGMENTATION MAMMAPLASTY Bilateral 28years ago   • MO KNEE SCOPE,MED/LAT MENISECTOMY Right 04/06/2021    Procedure: KNEE ARTHROSCOPY, medial lateral menisectomy,chondroplasty,synovectomy,inj;  Surgeon: Joslyn Skinner DO;  Location: [address] [place] [address] MAIN OR;  Service: Orthopedics   • SKIN BIOPSY  2013    2 PUNCH BIOPSIES   • SKIN LESION EXCISION Right 2013    LEG   • TONSILLECTOMY     • TUBAL LIGATION     • WOUND DEBRIDEMENT Left 10/19/2022    Procedure: DEBRIDEMENT WOUND AND DRESSING CHANGE [facility]); Surgeon: Agustina Chavarria MD;  Location: [facility] MAIN OR;  Service: Orthopedics       Meds/Allergies:  all medications and allergies reviewed    Allergies: Allergies   Allergen Reactions   • Bacitracin Rash   • Neomycin Rash   • Polymyxin B Rash   • Vilazodone Other (See Comments)     Other reaction(s): abdomen pain       Social History:     Marital Status: /Civil Union    Substance Use History:   Social History     Substance and Sexual Activity   Alcohol Use Never     Social History     Tobacco Use   Smoking Status Current Every Day Smoker   • Packs/day: 1 00   • Years: 42 00   • Pack years: 42 00   • Types: Cigarettes   Smokeless Tobacco Never Used     Social History     Substance and Sexual Activity   Drug Use Never       Family History:  I have reviewed the patients family history    Physical Exam:   Vitals:   Blood Pressure: 112/65 (10/22/22 1551)  Pulse: 60 (10/22/22 1551)  Temperature: 97 9 °F (36 6 °C) (10/22/22 1551)  Temp Source: Temporal (10/20/22 1441)  Respirations: (!) 24 (10/22/22 1551)  Height: 5' 6" (167 6 cm) (10/21/22 0900)  Weight - Scale: 63 5 kg (140 lb) (10/21/22 0900)  SpO2: 96 % (10/22/22 1551)    Physical Exam  Vitals and nursing note reviewed  Constitutional:       General: She is not in acute distress  Appearance: Normal appearance  HENT:      Head: Normocephalic and atraumatic        Right Ear: External ear normal       Left Ear: External ear normal       Nose: Nose normal  No rhinorrhea  Mouth/Throat:      Mouth: Mucous membranes are moist       Pharynx: Oropharynx is clear  Eyes:      General:         Right eye: No discharge  Left eye: No discharge  Pupils: Pupils are equal, round, and reactive to light  Cardiovascular:      Rate and Rhythm: Normal rate and regular rhythm  Pulses: Normal pulses  Heart sounds: Normal heart sounds  No murmur heard  Pulmonary:      Effort: Pulmonary effort is normal  No respiratory distress  Breath sounds: Normal breath sounds  Abdominal:      General: Bowel sounds are normal  There is no distension  Palpations: Abdomen is soft  There is no mass  Tenderness: There is no abdominal tenderness  Musculoskeletal:         General: Swelling present  No tenderness  Normal range of motion  Cervical back: Normal range of motion and neck supple  No muscular tenderness  Skin:     General: Skin is warm and dry  Capillary Refill: Capillary refill takes less than 2 seconds  Findings: Erythema (right antecubital with swelling and warmth to touch ) present  No rash  Neurological:      General: No focal deficit present  Mental Status: She is alert and oriented to person, place, and time  Mental status is at baseline  Psychiatric:         Mood and Affect: Mood normal          Behavior: Behavior normal          Thought Content: Thought content normal          Judgment: Judgment normal          Additional Data:   Lab Results: I have personally reviewed pertinent reports        Results from last 7 days   Lab Units 10/21/22  0651   WBC Thousand/uL 9 32   HEMOGLOBIN g/dL 11 8   HEMATOCRIT % 35 5   PLATELETS Thousands/uL 267   NEUTROS PCT % 53   LYMPHS PCT % 37   MONOS PCT % 8   EOS PCT % 1     Results from last 7 days   Lab Units 10/21/22  0651   SODIUM mmol/L 137   POTASSIUM mmol/L 4 4   CHLORIDE mmol/L 104   CO2 mmol/L 29   BUN mg/dL 19   CREATININE mg/dL 0 73 CALCIUM mg/dL 8 9             No results found for: HGBA1C        Imaging: I have personally reviewed pertinent reports  No orders to display       EKG, Pathology, and Other Studies Reviewed on Admission:   · EKG: n/a     ** Please Note: This note has been constructed using a voice recognition system   **

## 2022-10-22 NOTE — ASSESSMENT & PLAN NOTE
· Right upper arm/antecubital area erythema and edema   · Suspected that this is related IV infiltration; less likely cellulitis  · IV site was removed     · Continue with warm compress, elevate right arm   · Monitor closely   · Continue with iv zosyn  · If not improving will follow up with venous duplex of upper extremity to rule out for possible DVT

## 2022-10-22 NOTE — PLAN OF CARE
Problem: PAIN - ADULT  Goal: Verbalizes/displays adequate comfort level or baseline comfort level  Description: Interventions:  - Encourage patient to monitor pain and request assistance  - Assess pain using appropriate pain scale  - Administer analgesics based on type and severity of pain and evaluate response  - Implement non-pharmacological measures as appropriate and evaluate response  - Consider cultural and social influences on pain and pain management  - Notify physician/advanced practitioner if interventions unsuccessful or patient reports new pain  Outcome: Progressing     Problem: INFECTION - ADULT  Goal: Absence or prevention of progression during hospitalization  Description: INTERVENTIONS:  - Assess and monitor for signs and symptoms of infection  - Monitor lab/diagnostic results  - Monitor all insertion sites, i e  indwelling lines, tubes, and drains  - Monitor endotracheal if appropriate and nasal secretions for changes in amount and color  - [place] appropriate cooling/warming therapies per order  - Administer medications as ordered  - Instruct and encourage patient and family to use good hand hygiene technique  - Identify and instruct in appropriate isolation precautions for identified infection/condition  Outcome: Progressing  Goal: Absence of fever/infection during neutropenic period  Description: INTERVENTIONS:  - Monitor WBC    Outcome: Progressing     Problem: SAFETY ADULT  Goal: Patient will remain free of falls  Description: INTERVENTIONS:  - Educate patient/family on patient safety including physical limitations  - Instruct patient to call for assistance with activity   - Consult OT/PT to assist with strengthening/mobility   - Keep Call bell within reach  - Keep bed low and locked with side rails adjusted as appropriate  - Keep care items and personal belongings within reach  - Initiate and maintain comfort rounds  - Make Fall Risk Sign visible to staff  - Offer Toileting every 2 Hours, in advance of need  - Initiate/Maintain bed alarm  - Obtain necessary fall risk management equipment:   - Apply yellow socks and bracelet for high fall risk patients  - Consider moving patient to room near nurses station  Outcome: Progressing  Goal: Maintain or return to baseline ADL function  Description: INTERVENTIONS:  -  Assess patient's ability to carry out ADLs; assess patient's baseline for ADL function and identify physical deficits which impact ability to perform ADLs (bathing, care of mouth/teeth, toileting, grooming, dressing, etc )  - Assess/evaluate cause of self-care deficits   - Assess range of motion  - Assess patient's mobility; develop plan if impaired  - Assess patient's need for assistive devices and provide as appropriate  - Encourage maximum independence but intervene and supervise when necessary  - Involve family in performance of ADLs  - Assess for home care needs following discharge   - Consider OT consult to assist with ADL evaluation and planning for discharge  - Provide patient education as appropriate  Outcome: Progressing  Goal: Maintains/Returns to pre admission functional level  Description: INTERVENTIONS:  - Perform BMAT or MOVE assessment daily    - Set and communicate daily mobility goal to care team and patient/family/caregiver  - Collaborate with rehabilitation services on mobility goals if consulted  - Perform Range of Motion 2 times a day  - Reposition patient every 2 hours    - Dangle patient 2 times a day  - Stand patient 2 times a day  - Ambulate patient 2 times a day  - Out of bed to chair 2 times a day   - Out of bed for meals 2 times a day  - Out of bed for toileting  - Record patient progress and toleration of activity level   Outcome: Progressing     Problem: DISCHARGE PLANNING  Goal: Discharge to home or other facility with appropriate resources  Description: INTERVENTIONS:  - Identify barriers to discharge w/patient and caregiver  - Arrange for needed discharge resources and transportation as appropriate  - Identify discharge learning needs (meds, wound care, etc )  - Arrange for interpretive services to assist at discharge as needed  - Refer to Case Management Department for coordinating discharge planning if the patient needs post-hospital services based on physician/advanced practitioner order or complex needs related to functional status, cognitive ability, or social support system  Outcome: Progressing     Problem: Knowledge Deficit  Goal: Patient/family/caregiver demonstrates understanding of disease process, treatment plan, medications, and discharge instructions  Description: Complete learning assessment and assess knowledge base  Interventions:  - Provide teaching at level of understanding  - Provide teaching via preferred learning methods  Outcome: Progressing     Problem: MOBILITY - ADULT  Goal: Maintain or return to baseline ADL function  Description: INTERVENTIONS:  -  Assess patient's ability to carry out ADLs; assess patient's baseline for ADL function and identify physical deficits which impact ability to perform ADLs (bathing, care of mouth/teeth, toileting, grooming, dressing, etc )  - Assess/evaluate cause of self-care deficits   - Assess range of motion  - Assess patient's mobility; develop plan if impaired  - Assess patient's need for assistive devices and provide as appropriate  - Encourage maximum independence but intervene and supervise when necessary  - Involve family in performance of ADLs  - Assess for home care needs following discharge   - Consider OT consult to assist with ADL evaluation and planning for discharge  - Provide patient education as appropriate  Outcome: Progressing  Goal: Maintains/Returns to pre admission functional level  Description: INTERVENTIONS:  - Perform BMAT or MOVE assessment daily    - Set and communicate daily mobility goal to care team and patient/family/caregiver     - Collaborate with rehabilitation services on mobility goals if consulted  - Perform Range of Motion 2 times a day  - Reposition patient every 2 hours    - Dangle patient 2 times a day  - Stand patient 2 times a day  - Ambulate patient 2 times a day  - Out of bed to chair 2 times a day   - Out of bed for meals 2 times a day  - Out of bed for toileting  - Record patient progress and toleration of activity level   Outcome: Progressing

## 2022-10-22 NOTE — NURSING NOTE
Suma valadez made aware of iv site that was removed earlier in the day is now red and swollen, site elevated and wrapped with warm compress

## 2022-10-22 NOTE — NURSING NOTE
Pt sleeping on/off during shift, pain meds given per order  Left hand elevated, ice pack provided, fingers pink and mobile, bandage in place clean dry and intact

## 2022-10-23 LAB
ANION GAP SERPL CALCULATED.3IONS-SCNC: 7 MMOL/L (ref 4–13)
BASOPHILS # BLD AUTO: 0.07 THOUSANDS/ÂΜL (ref 0–0.1)
BASOPHILS NFR BLD AUTO: 1 % (ref 0–1)
BUN SERPL-MCNC: 13 MG/DL (ref 5–25)
CALCIUM SERPL-MCNC: 9.5 MG/DL (ref 8.4–10.2)
CHLORIDE SERPL-SCNC: 99 MMOL/L (ref 96–108)
CO2 SERPL-SCNC: 30 MMOL/L (ref 21–32)
CREAT SERPL-MCNC: 0.74 MG/DL (ref 0.6–1.3)
EOSINOPHIL # BLD AUTO: 0.26 THOUSAND/ÂΜL (ref 0–0.61)
EOSINOPHIL NFR BLD AUTO: 3 % (ref 0–6)
ERYTHROCYTE [DISTWIDTH] IN BLOOD BY AUTOMATED COUNT: 12.4 % (ref 11.6–15.1)
GFR SERPL CREATININE-BSD FRML MDRD: 91 ML/MIN/1.73SQ M
GLUCOSE SERPL-MCNC: 94 MG/DL (ref 65–140)
HCT VFR BLD AUTO: 40.9 % (ref 34.8–46.1)
HGB BLD-MCNC: 13.5 G/DL (ref 11.5–15.4)
IMM GRANULOCYTES # BLD AUTO: 0.02 THOUSAND/UL (ref 0–0.2)
IMM GRANULOCYTES NFR BLD AUTO: 0 % (ref 0–2)
LYMPHOCYTES # BLD AUTO: 2.29 THOUSANDS/ÂΜL (ref 0.6–4.47)
LYMPHOCYTES NFR BLD AUTO: 30 % (ref 14–44)
MCH RBC QN AUTO: 29.9 PG (ref 26.8–34.3)
MCHC RBC AUTO-ENTMCNC: 33 G/DL (ref 31.4–37.4)
MCV RBC AUTO: 91 FL (ref 82–98)
MONOCYTES # BLD AUTO: 0.59 THOUSAND/ÂΜL (ref 0.17–1.22)
MONOCYTES NFR BLD AUTO: 8 % (ref 4–12)
NEUTROPHILS # BLD AUTO: 4.48 THOUSANDS/ÂΜL (ref 1.85–7.62)
NEUTS SEG NFR BLD AUTO: 58 % (ref 43–75)
NRBC BLD AUTO-RTO: 0 /100 WBCS
PLATELET # BLD AUTO: 306 THOUSANDS/UL (ref 149–390)
PMV BLD AUTO: 9.8 FL (ref 8.9–12.7)
POTASSIUM SERPL-SCNC: 4.7 MMOL/L (ref 3.5–5.3)
RBC # BLD AUTO: 4.51 MILLION/UL (ref 3.81–5.12)
SODIUM SERPL-SCNC: 136 MMOL/L (ref 135–147)
WBC # BLD AUTO: 7.71 THOUSAND/UL (ref 4.31–10.16)

## 2022-10-23 PROCEDURE — 85025 COMPLETE CBC W/AUTO DIFF WBC: CPT | Performed by: ORTHOPAEDIC SURGERY

## 2022-10-23 PROCEDURE — 99232 SBSQ HOSP IP/OBS MODERATE 35: CPT | Performed by: ORTHOPAEDIC SURGERY

## 2022-10-23 PROCEDURE — 80048 BASIC METABOLIC PNL TOTAL CA: CPT | Performed by: ORTHOPAEDIC SURGERY

## 2022-10-23 RX ADMIN — OXYCODONE HYDROCHLORIDE 5 MG: 5 TABLET ORAL at 06:02

## 2022-10-23 RX ADMIN — ACETAMINOPHEN 975 MG: 325 TABLET ORAL at 12:01

## 2022-10-23 RX ADMIN — PIPERACILLIN AND TAZOBACTAM 3.38 G: 3; .375 INJECTION, POWDER, FOR SOLUTION INTRAVENOUS at 12:58

## 2022-10-23 RX ADMIN — OXYCODONE HYDROCHLORIDE 5 MG: 5 TABLET ORAL at 10:06

## 2022-10-23 RX ADMIN — ZOLPIDEM TARTRATE 5 MG: 5 TABLET ORAL at 22:37

## 2022-10-23 RX ADMIN — ESCITALOPRAM OXALATE 20 MG: 10 TABLET ORAL at 08:37

## 2022-10-23 RX ADMIN — PIPERACILLIN AND TAZOBACTAM 3.38 G: 3; .375 INJECTION, POWDER, FOR SOLUTION INTRAVENOUS at 19:36

## 2022-10-23 RX ADMIN — ACETAMINOPHEN 975 MG: 325 TABLET ORAL at 06:02

## 2022-10-23 RX ADMIN — PIPERACILLIN AND TAZOBACTAM 3.38 G: 3; .375 INJECTION, POWDER, FOR SOLUTION INTRAVENOUS at 01:54

## 2022-10-23 RX ADMIN — OXYCODONE HYDROCHLORIDE 5 MG: 5 TABLET ORAL at 22:35

## 2022-10-23 RX ADMIN — OXYCODONE HYDROCHLORIDE 5 MG: 5 TABLET ORAL at 14:26

## 2022-10-23 RX ADMIN — OXYCODONE HYDROCHLORIDE 5 MG: 5 TABLET ORAL at 18:26

## 2022-10-23 RX ADMIN — PIPERACILLIN AND TAZOBACTAM 3.38 G: 3; .375 INJECTION, POWDER, FOR SOLUTION INTRAVENOUS at 08:37

## 2022-10-23 RX ADMIN — DOCUSATE SODIUM 100 MG: 100 CAPSULE, LIQUID FILLED ORAL at 08:37

## 2022-10-23 RX ADMIN — DOCUSATE SODIUM 100 MG: 100 CAPSULE, LIQUID FILLED ORAL at 17:01

## 2022-10-23 RX ADMIN — OXYCODONE HYDROCHLORIDE 5 MG: 5 TABLET ORAL at 01:54

## 2022-10-23 RX ADMIN — ACETAMINOPHEN 975 MG: 325 TABLET ORAL at 19:36

## 2022-10-23 NOTE — DISCHARGE INSTRUCTIONS
Discharge Instructions- Orthopedics    Dressings:  Please keep your hand clean and dry  You may perform dressing changes as needed with gauze, kerlix, and ace wrap  Do not soak or rub at your wound/incision, or place any ointment, lotions, or creams onto your wound/incision  Antibiotics:  Please take your antibiotics as directed    Motion:  You should move your fingers, wrist, and elbow multiple times a day  Pain:  You may take Tylenol/NSAIDs for pain relief  A script for a narcotic pain medication has been sent to your pharmacy on record to take as needed  Please do not take more than 4,000mg of Tylenol (acetaminophen) a day  You may also use ice and elevation of the extremity for pain and swelling control  Follow-Up:  Please follow up with Dr Shu Gupta in 1 week   If you do not have an appointment scheduled, please call the ortho office at [phone]

## 2022-10-23 NOTE — PLAN OF CARE
Problem: PAIN - ADULT  Goal: Verbalizes/displays adequate comfort level or baseline comfort level  Description: Interventions:  - Encourage patient to monitor pain and request assistance  - Assess pain using appropriate pain scale  - Administer analgesics based on type and severity of pain and evaluate response  - Implement non-pharmacological measures as appropriate and evaluate response  - Consider cultural and social influences on pain and pain management  - Notify physician/advanced practitioner if interventions unsuccessful or patient reports new pain  Outcome: Progressing     Problem: INFECTION - ADULT  Goal: Absence or prevention of progression during hospitalization  Description: INTERVENTIONS:  - Assess and monitor for signs and symptoms of infection  - Monitor lab/diagnostic results  - Monitor all insertion sites, i e  indwelling lines, tubes, and drains  - Monitor endotracheal if appropriate and nasal secretions for changes in amount and color  - [place] appropriate cooling/warming therapies per order  - Administer medications as ordered  - Instruct and encourage patient and family to use good hand hygiene technique  - Identify and instruct in appropriate isolation precautions for identified infection/condition  Outcome: Progressing  Goal: Absence of fever/infection during neutropenic period  Description: INTERVENTIONS:  - Monitor WBC    Outcome: Progressing     Problem: SAFETY ADULT  Goal: Patient will remain free of falls  Description: INTERVENTIONS:  - Educate patient/family on patient safety including physical limitations  - Instruct patient to call for assistance with activity   - Consult OT/PT to assist with strengthening/mobility   - Keep Call bell within reach  - Keep bed low and locked with side rails adjusted as appropriate  - Keep care items and personal belongings within reach  - Initiate and maintain comfort rounds  - Make Fall Risk Sign visible to staff  - Offer Toileting every 02 Hours, in advance of need  - Initiate/Maintain bed alarm  - Obtain necessary fall risk management equipment:   - Apply yellow socks and bracelet for high fall risk patients  - Consider moving patient to room near nurses station  Outcome: Progressing  Goal: Maintain or return to baseline ADL function  Description: INTERVENTIONS:  -  Assess patient's ability to carry out ADLs; assess patient's baseline for ADL function and identify physical deficits which impact ability to perform ADLs (bathing, care of mouth/teeth, toileting, grooming, dressing, etc )  - Assess/evaluate cause of self-care deficits   - Assess range of motion  - Assess patient's mobility; develop plan if impaired  - Assess patient's need for assistive devices and provide as appropriate  - Encourage maximum independence but intervene and supervise when necessary  - Involve family in performance of ADLs  - Assess for home care needs following discharge   - Consider OT consult to assist with ADL evaluation and planning for discharge  - Provide patient education as appropriate  Outcome: Progressing  Goal: Maintains/Returns to pre admission functional level  Description: INTERVENTIONS:  - Perform BMAT or MOVE assessment daily    - Set and communicate daily mobility goal to care team and patient/family/caregiver  - Collaborate with rehabilitation services on mobility goals if consulted  - Perform Range of Motion 2 times a day  - Reposition patient every 2 hours    - Dangle patient 2 times a day  - Stand patient 2 times a day  - Ambulate patient 2 times a day  - Out of bed to chair 2 times a day   - Out of bed for meals 2 times a day  - Out of bed for toileting  - Record patient progress and toleration of activity level   Outcome: Progressing     Problem: DISCHARGE PLANNING  Goal: Discharge to home or other facility with appropriate resources  Description: INTERVENTIONS:  - Identify barriers to discharge w/patient and caregiver  - Arrange for needed discharge resources and transportation as appropriate  - Identify discharge learning needs (meds, wound care, etc )  - Arrange for interpretive services to assist at discharge as needed  - Refer to Case Management Department for coordinating discharge planning if the patient needs post-hospital services based on physician/advanced practitioner order or complex needs related to functional status, cognitive ability, or social support system  Outcome: Progressing     Problem: Knowledge Deficit  Goal: Patient/family/caregiver demonstrates understanding of disease process, treatment plan, medications, and discharge instructions  Description: Complete learning assessment and assess knowledge base  Interventions:  - Provide teaching at level of understanding  - Provide teaching via preferred learning methods  Outcome: Progressing     Problem: MOBILITY - ADULT  Goal: Maintain or return to baseline ADL function  Description: INTERVENTIONS:  -  Assess patient's ability to carry out ADLs; assess patient's baseline for ADL function and identify physical deficits which impact ability to perform ADLs (bathing, care of mouth/teeth, toileting, grooming, dressing, etc )  - Assess/evaluate cause of self-care deficits   - Assess range of motion  - Assess patient's mobility; develop plan if impaired  - Assess patient's need for assistive devices and provide as appropriate  - Encourage maximum independence but intervene and supervise when necessary  - Involve family in performance of ADLs  - Assess for home care needs following discharge   - Consider OT consult to assist with ADL evaluation and planning for discharge  - Provide patient education as appropriate  Outcome: Progressing  Goal: Maintains/Returns to pre admission functional level  Description: INTERVENTIONS:  - Perform BMAT or MOVE assessment daily    - Set and communicate daily mobility goal to care team and patient/family/caregiver     - Collaborate with rehabilitation services on mobility goals if consulted  - Perform Range of Motion 2 times a day  - Reposition patient every 2 hours    - Dangle patient 2 times a day  - Stand patient 2 times a day  - Ambulate patient 2 times a day  - Out of bed to chair 2 times a day   - Out of bed for meals 2 times a day  - Out of bed for toileting  - Record patient progress and toleration of activity level   Outcome: Progressing

## 2022-10-23 NOTE — PROGRESS NOTES
Progress Note - Orthopedics   Marietta Anaya 56 y o  female MRN: [id number]  Unit/Bed#: -01      Subjective:    56 y  o female POD4 s/p left hand I&D  It was noted by the nursing team yesterday evening that the patient's IV, which was located along the right [facility], had infiltrated causing edema and pain  The nursing team removed the IV and placed a new one in another location  SLIM was consulted for evaluation, who recommended warm compresses and elevation  Today the patient states that her right arm is doing better  She denies any fevers, or chills  The patient reports ongoing improvements as well in her left hand swelling and pain today  She denies any numbness or tingling  She denies any shortness of breath, dizziness, headache, or chest pain  The patient questions today when she may return home       Labs:  0   Lab Value Date/Time    HCT 40 9 10/23/2022 0510    HCT 35 5 10/21/2022 0651    HCT 38 1 10/19/2022 1700    HGB 13 5 10/23/2022 0510    HGB 11 8 10/21/2022 0651    HGB 12 8 10/19/2022 1700    WBC 7 71 10/23/2022 0510    WBC 9 32 10/21/2022 0651    WBC 8 56 10/19/2022 1700    ESR 10 10/19/2022 1700    CRP 3 2 (H) 10/19/2022 1700       Meds:    Current Facility-Administered Medications:   •  acetaminophen (TYLENOL) tablet 975 mg, 975 mg, Oral, Q8H, Jerald Miguel MD, 975 mg at 10/23/22 0602  •  albuterol (PROVENTIL HFA,VENTOLIN HFA) inhaler 2 puff, 2 puff, Inhalation, Q4H PRN, Jerald Miguel MD  •  aluminum-magnesium hydroxide-simethicone (MYLANTA) oral suspension 30 mL, 30 mL, Oral, Q6H PRN, Jerald Miguel MD  •  calcium carbonate (TUMS) chewable tablet 1,000 mg, 1,000 mg, Oral, Daily PRN, Jerald Miguel MD  •  docusate sodium (COLACE) capsule 100 mg, 100 mg, Oral, BID, Jerald Miguel MD, 100 mg at 10/23/22 4506  •  escitalopram (LEXAPRO) tablet 20 mg, 20 mg, Oral, Daily, Jerald Miguel MD, 20 mg at 10/23/22 1022  •  influenza vaccine, recombinant, quadrivalent (FLUBLOK) IM injection 0 5 mL, 0 5 mL, Intramuscular, Once, Robby Patterson MD  •  nicotine (NICODERM CQ) 7 mg/24hr TD 24 hr patch 1 patch, 1 patch, Transdermal, PRN, Robby Patterson MD  •  ondansetron [facility] injection 4 mg, 4 mg, Intravenous, Q6H PRN, Robby Patterson MD  •  oxyCODONE (ROXICODONE) immediate release tablet 10 mg, 10 mg, Oral, Q8H PRN, Montrell Stone PA-C  •  oxyCODONE (ROXICODONE) IR tablet 5 mg, 5 mg, Oral, Q4H PRN, Robby Patterson MD, 5 mg at 10/23/22 1006  •  piperacillin-tazobactam (ZOSYN) IVPB 3 375 g, 3 375 g, Intravenous, Q6H, Radha Naqvi MD, Last Rate: 0 mL/hr at 10/22/22 1146, 3 375 g at 10/23/22 8502  •  zolpidem (AMBIEN) tablet 5 mg, 5 mg, Oral, HS PRN, Robby Patterson MD, 5 mg at 10/22/22 2051    Blood Culture:   No results found for: BLOODCX    Wound Culture:   Lab Results   Component Value Date    WOUNDCULT 2+ Growth of Aeromonas hydrophila (A) 10/19/2022    WOUNDCULT 2+ Growth of Enterobacter cloacae (A) 10/19/2022       Ins and Outs:  I/O last 24 hours: In: 600 [P O :600]  Out: -       Physical:  Vitals:    10/23/22 0734   BP: 130/67   Pulse: 63   Resp: 18   Temp: 97 9 °F (36 6 °C)   SpO2: 96%     Musculoskeletal: left Upper Extremity  · Patient lying in bed comfortably, in no acute distress  · Dressings removed without difficulty  Mild serous drainage along the adaptic  Incision is well approximated with suture, no active or appreciable drainage  Swelling has pretty much resolved at this time  No erythema, ecchymosis, warmth, or signs of infection  No ascending lymphangitis, or lymphadenopathy  · Significantly tender to palpation along the palmar aspect of the hand, though she notes that the tenderness has improved from yesterday    · Sensation intact to median/radial/ulnar nerve distribution   · Patient continues to experience decreased ROM of the digits and the wrist due to pain, though today is actively able to fully extend all digits, composite fist lacks approximately 2cm  · Active wrist palmarflexion to 30 degrees, dorsiflexion to 10 degrees  · Full active ROM of the elbow without complaint  · 2+ radial pulse  · Digits warm and well perfused  · Capillary refill < 2 seconds    Right upper extremity:  · Skin without lesions, ecchymosis, erythema, swelling, warmth, or other signs of infection  There is currently an IV placed along the dorsum wrist    · The patient does note some palpable tenderness directly over the ante-cubital area, extending into the biceps  No ascending lymphangitis or lymphadenopathy  · Full active ROM of the digits, wrist, elbow, and shoulder without complaint  · Sensation and motor intact along the radial, median, and ulnar nerve distribution  · Strong radial pulse  · Brisk cap refill in all fingers      Assessment:    56 y  o female POD4 left hand I&D  Patient doing well, condition continues to improve  Dressings changed today without complication  No active drainage, no concerning erythema, resolved swelling of the left hand  Noted area of IV infiltration along the right AC doing better today, exam without swelling or decreased elbow ROM, no evidence of infection  Patient remains afebrile, WBC wnl  Plan:  · NWB on left palm  · Most recent HGB 13 5 this AM  Will continue to monitor for signs and symptoms of ABLA and administer IVF/prbc as indicated  · Most recent WBC 7 71 this AM   · Pain control  · DVT ppx foot pumps/SCDs  · Dispo: Ortho will follow   · Discussed with ID on call, ID will evaluate the patient tomorrow for clearance to go home with oral abx  For now will continue with IV abx  · Patient will follow up with Dr Rose Bates as outpatient for ongoing management of her hand one week post-discharge       Kassie Bates PA-C

## 2022-10-24 ENCOUNTER — TRANSITIONAL CARE MANAGEMENT (OUTPATIENT)
Dept: INTERNAL MEDICINE CLINIC | Facility: CLINIC | Age: 57
End: 2022-10-24

## 2022-10-24 ENCOUNTER — TELEPHONE (OUTPATIENT)
Dept: OBGYN CLINIC | Facility: HOSPITAL | Age: 57
End: 2022-10-24

## 2022-10-24 ENCOUNTER — TELEPHONE (OUTPATIENT)
Dept: OBGYN CLINIC | Facility: MEDICAL CENTER | Age: 57
End: 2022-10-24

## 2022-10-24 VITALS
OXYGEN SATURATION: 96 % | WEIGHT: 140 LBS | SYSTOLIC BLOOD PRESSURE: 127 MMHG | BODY MASS INDEX: 22.5 KG/M2 | TEMPERATURE: 97.5 F | DIASTOLIC BLOOD PRESSURE: 83 MMHG | HEIGHT: 66 IN | RESPIRATION RATE: 19 BRPM | HEART RATE: 61 BPM

## 2022-10-24 LAB
ANION GAP SERPL CALCULATED.3IONS-SCNC: 6 MMOL/L (ref 4–13)
BASOPHILS # BLD AUTO: 0.09 THOUSANDS/ÂΜL (ref 0–0.1)
BASOPHILS NFR BLD AUTO: 1 % (ref 0–1)
BUN SERPL-MCNC: 19 MG/DL (ref 5–25)
CALCIUM SERPL-MCNC: 9.9 MG/DL (ref 8.4–10.2)
CHLORIDE SERPL-SCNC: 102 MMOL/L (ref 96–108)
CO2 SERPL-SCNC: 29 MMOL/L (ref 21–32)
CREAT SERPL-MCNC: 0.74 MG/DL (ref 0.6–1.3)
EOSINOPHIL # BLD AUTO: 0.4 THOUSAND/ÂΜL (ref 0–0.61)
EOSINOPHIL NFR BLD AUTO: 6 % (ref 0–6)
ERYTHROCYTE [DISTWIDTH] IN BLOOD BY AUTOMATED COUNT: 12.2 % (ref 11.6–15.1)
GFR SERPL CREATININE-BSD FRML MDRD: 91 ML/MIN/1.73SQ M
GLUCOSE SERPL-MCNC: 91 MG/DL (ref 65–140)
HCT VFR BLD AUTO: 43.3 % (ref 34.8–46.1)
HGB BLD-MCNC: 14.3 G/DL (ref 11.5–15.4)
IMM GRANULOCYTES # BLD AUTO: 0.03 THOUSAND/UL (ref 0–0.2)
IMM GRANULOCYTES NFR BLD AUTO: 0 % (ref 0–2)
LYMPHOCYTES # BLD AUTO: 2.37 THOUSANDS/ÂΜL (ref 0.6–4.47)
LYMPHOCYTES NFR BLD AUTO: 33 % (ref 14–44)
MCH RBC QN AUTO: 29.9 PG (ref 26.8–34.3)
MCHC RBC AUTO-ENTMCNC: 33 G/DL (ref 31.4–37.4)
MCV RBC AUTO: 90 FL (ref 82–98)
MONOCYTES # BLD AUTO: 0.66 THOUSAND/ÂΜL (ref 0.17–1.22)
MONOCYTES NFR BLD AUTO: 9 % (ref 4–12)
NEUTROPHILS # BLD AUTO: 3.66 THOUSANDS/ÂΜL (ref 1.85–7.62)
NEUTS SEG NFR BLD AUTO: 51 % (ref 43–75)
NRBC BLD AUTO-RTO: 0 /100 WBCS
PLATELET # BLD AUTO: 317 THOUSANDS/UL (ref 149–390)
PMV BLD AUTO: 9.7 FL (ref 8.9–12.7)
POTASSIUM SERPL-SCNC: 4.5 MMOL/L (ref 3.5–5.3)
RBC # BLD AUTO: 4.79 MILLION/UL (ref 3.81–5.12)
SODIUM SERPL-SCNC: 137 MMOL/L (ref 135–147)
WBC # BLD AUTO: 7.21 THOUSAND/UL (ref 4.31–10.16)

## 2022-10-24 PROCEDURE — 85025 COMPLETE CBC W/AUTO DIFF WBC: CPT | Performed by: ORTHOPAEDIC SURGERY

## 2022-10-24 PROCEDURE — 80048 BASIC METABOLIC PNL TOTAL CA: CPT | Performed by: ORTHOPAEDIC SURGERY

## 2022-10-24 PROCEDURE — NC001 PR NO CHARGE: Performed by: PHYSICIAN ASSISTANT

## 2022-10-24 PROCEDURE — 99232 SBSQ HOSP IP/OBS MODERATE 35: CPT | Performed by: INTERNAL MEDICINE

## 2022-10-24 PROCEDURE — 99238 HOSP IP/OBS DSCHRG MGMT 30/<: CPT | Performed by: PHYSICIAN ASSISTANT

## 2022-10-24 RX ORDER — OXYCODONE HYDROCHLORIDE AND ACETAMINOPHEN 5; 325 MG/1; MG/1
1 TABLET ORAL EVERY 4 HOURS PRN
Qty: 14 TABLET | Refills: 0 | Status: SHIPPED | OUTPATIENT
Start: 2022-10-24 | End: 2022-11-03

## 2022-10-24 RX ORDER — METRONIDAZOLE 500 MG/1
500 TABLET ORAL EVERY 8 HOURS SCHEDULED
Qty: 42 TABLET | Refills: 0 | Status: SHIPPED | OUTPATIENT
Start: 2022-10-24 | End: 2022-11-07

## 2022-10-24 RX ORDER — SULFAMETHOXAZOLE AND TRIMETHOPRIM 800; 160 MG/1; MG/1
1 TABLET ORAL EVERY 12 HOURS SCHEDULED
Qty: 28 TABLET | Refills: 0 | Status: SHIPPED | OUTPATIENT
Start: 2022-10-24 | End: 2022-11-07

## 2022-10-24 RX ADMIN — PIPERACILLIN AND TAZOBACTAM 3.38 G: 3; .375 INJECTION, POWDER, FOR SOLUTION INTRAVENOUS at 07:32

## 2022-10-24 RX ADMIN — ESCITALOPRAM OXALATE 20 MG: 10 TABLET ORAL at 08:45

## 2022-10-24 RX ADMIN — OXYCODONE HYDROCHLORIDE 5 MG: 5 TABLET ORAL at 07:32

## 2022-10-24 RX ADMIN — OXYCODONE HYDROCHLORIDE 5 MG: 5 TABLET ORAL at 02:52

## 2022-10-24 RX ADMIN — ACETAMINOPHEN 975 MG: 325 TABLET ORAL at 05:16

## 2022-10-24 RX ADMIN — DOCUSATE SODIUM 100 MG: 100 CAPSULE, LIQUID FILLED ORAL at 08:45

## 2022-10-24 RX ADMIN — PIPERACILLIN AND TAZOBACTAM 3.38 G: 3; .375 INJECTION, POWDER, FOR SOLUTION INTRAVENOUS at 02:06

## 2022-10-24 NOTE — PLAN OF CARE
Problem: PAIN - ADULT  Goal: Verbalizes/displays adequate comfort level or baseline comfort level  Description: Interventions:  - Encourage patient to monitor pain and request assistance  - Assess pain using appropriate pain scale  - Administer analgesics based on type and severity of pain and evaluate response  - Implement non-pharmacological measures as appropriate and evaluate response  - Consider cultural and social influences on pain and pain management  - Notify physician/advanced practitioner if interventions unsuccessful or patient reports new pain  Outcome: Progressing     Problem: INFECTION - ADULT  Goal: Absence or prevention of progression during hospitalization  Description: INTERVENTIONS:  - Assess and monitor for signs and symptoms of infection  - Monitor lab/diagnostic results  - Monitor all insertion sites, i e  indwelling lines, tubes, and drains  - Monitor endotracheal if appropriate and nasal secretions for changes in amount and color  - [place] appropriate cooling/warming therapies per order  - Administer medications as ordered  - Instruct and encourage patient and family to use good hand hygiene technique  - Identify and instruct in appropriate isolation precautions for identified infection/condition  Outcome: Progressing  Goal: Absence of fever/infection during neutropenic period  Description: INTERVENTIONS:  - Monitor WBC    Outcome: Progressing     Problem: SAFETY ADULT  Goal: Patient will remain free of falls  Description: INTERVENTIONS:  - Educate patient/family on patient safety including physical limitations  - Instruct patient to call for assistance with activity   - Consult OT/PT to assist with strengthening/mobility   - Keep Call bell within reach  - Keep bed low and locked with side rails adjusted as appropriate  - Keep care items and personal belongings within reach  - Initiate and maintain comfort rounds  - Make Fall Risk Sign visible to staff  - Offer Toileting every 2 Hours, in advance of need  - Initiate/Maintain bed alarm  - Obtain necessary fall risk management equipment:   - Apply yellow socks and bracelet for high fall risk patients  - Consider moving patient to room near nurses station  Outcome: Progressing  Goal: Maintain or return to baseline ADL function  Description: INTERVENTIONS:  -  Assess patient's ability to carry out ADLs; assess patient's baseline for ADL function and identify physical deficits which impact ability to perform ADLs (bathing, care of mouth/teeth, toileting, grooming, dressing, etc )  - Assess/evaluate cause of self-care deficits   - Assess range of motion  - Assess patient's mobility; develop plan if impaired  - Assess patient's need for assistive devices and provide as appropriate  - Encourage maximum independence but intervene and supervise when necessary  - Involve family in performance of ADLs  - Assess for home care needs following discharge   - Consider OT consult to assist with ADL evaluation and planning for discharge  - Provide patient education as appropriate  Outcome: Progressing  Goal: Maintains/Returns to pre admission functional level  Description: INTERVENTIONS:  - Perform BMAT or MOVE assessment daily    - Set and communicate daily mobility goal to care team and patient/family/caregiver  - Collaborate with rehabilitation services on mobility goals if consulted  - Perform Range of Motion 2 times a day  - Reposition patient every 2 hours    - Dangle patient 2 times a day  - Stand patient 2 times a day  - Ambulate patient 2 times a day  - Out of bed to chair 2 times a day   - Out of bed for meals 2 times a day  - Out of bed for toileting  - Record patient progress and toleration of activity level   Outcome: Progressing     Problem: DISCHARGE PLANNING  Goal: Discharge to home or other facility with appropriate resources  Description: INTERVENTIONS:  - Identify barriers to discharge w/patient and caregiver  - Arrange for needed discharge resources and transportation as appropriate  - Identify discharge learning needs (meds, wound care, etc )  - Arrange for interpretive services to assist at discharge as needed  - Refer to Case Management Department for coordinating discharge planning if the patient needs post-hospital services based on physician/advanced practitioner order or complex needs related to functional status, cognitive ability, or social support system  Outcome: Progressing     Problem: Knowledge Deficit  Goal: Patient/family/caregiver demonstrates understanding of disease process, treatment plan, medications, and discharge instructions  Description: Complete learning assessment and assess knowledge base  Interventions:  - Provide teaching at level of understanding  - Provide teaching via preferred learning methods  Outcome: Progressing     Problem: MOBILITY - ADULT  Goal: Maintain or return to baseline ADL function  Description: INTERVENTIONS:  -  Assess patient's ability to carry out ADLs; assess patient's baseline for ADL function and identify physical deficits which impact ability to perform ADLs (bathing, care of mouth/teeth, toileting, grooming, dressing, etc )  - Assess/evaluate cause of self-care deficits   - Assess range of motion  - Assess patient's mobility; develop plan if impaired  - Assess patient's need for assistive devices and provide as appropriate  - Encourage maximum independence but intervene and supervise when necessary  - Involve family in performance of ADLs  - Assess for home care needs following discharge   - Consider OT consult to assist with ADL evaluation and planning for discharge  - Provide patient education as appropriate  Outcome: Progressing  Goal: Maintains/Returns to pre admission functional level  Description: INTERVENTIONS:  - Perform BMAT or MOVE assessment daily    - Set and communicate daily mobility goal to care team and patient/family/caregiver     - Collaborate with rehabilitation services on mobility goals if consulted  - Perform Range of Motion 2 times a day  - Reposition patient every 2 hours    - Dangle patient 2 times a day  - Stand patient 2 times a day  - Ambulate patient 2 times a day  - Out of bed to chair 2 times a day   - Out of bed for meals 2 times a day  - Out of bed for toileting  - Record patient progress and toleration of activity level   Outcome: Progressing

## 2022-10-24 NOTE — PROGRESS NOTES
Progress Note - Orthopedics   Calvin Riggs 56 y o  female MRN: [id number]  Unit/Bed#: MS Abilio-01 Encounter: [id number]    Assessment:  POD 5 s/p I and D of left hand    Plan:  Dressing changed today  Spoke with Infectious Disease, may discharge the patient today on Bactrim DS and Flagyl  Follow-up in 1 week with Dr Dinah Resendez  Daily dressing changes at home    Weight bearing:  Nonweightbearing left upper extremity    VTE Pharmacologic Prophylaxis: Sequential compression device (Venodyne)   VTE Mechanical Prophylaxis: sequential compression device    Subjective: The patient states that her pain and swelling have improved today    Vitals: Blood pressure 127/83, pulse 61, temperature 97 5 °F (36 4 °C), resp  rate 19, height 5' 6" (1 676 m), weight 63 5 kg (140 lb), last menstrual period 02/01/2021, SpO2 96 %, not currently breastfeeding  ,Body mass index is 22 6 kg/m²        Intake/Output Summary (Last 24 hours) at 10/24/2022 0948  Last data filed at 10/24/2022 0900  Gross per 24 hour   Intake 240 ml   Output --   Net 240 ml       Invasive Devices  Report    Peripheral Intravenous Line  Duration           Peripheral IV 10/22/22 Right;Ventral (anterior) Forearm 2 days                Physical Exam:   Left upper extremity is neurovascularly intact  Toes are pink and mobile  Compartments are soft  Incision is clean and intact  Very mild amount discharge  No streaking  No faye pus  Brisk cap refill  Sensation intact      Lab, Imaging and other studies:   CBC:   Lab Results   Component Value Date    WBC 7 21 10/24/2022    HGB 14 3 10/24/2022    HCT 43 3 10/24/2022    MCV 90 10/24/2022     10/24/2022    MCH 29 9 10/24/2022    MCHC 33 0 10/24/2022    RDW 12 2 10/24/2022    MPV 9 7 10/24/2022    NRBC 0 10/24/2022     CMP:   Lab Results   Component Value Date    SODIUM 137 10/24/2022     10/24/2022    CO2 29 10/24/2022    BUN 19 10/24/2022    CREATININE 0 74 10/24/2022    CALCIUM 9 9 10/24/2022    EGFR 91 10/24/2022

## 2022-10-24 NOTE — PROGRESS NOTES
Progress Note - [facility] Infectious Disease   Luci Soto 56 y o  female MRN: [id number]  Unit/Bed#: -01 Encounter: [id number]      IMPRESSION & RECOMMENDATIONS:   1  Necrotizing cellulitis of left hand  This is likely secondary to left hand laceration with exposure to contaminated floor  Patient is status post I&D 10/19/2022 with findings of necrotic tissue and purulence mostly tracking along fascia with only slight penetration  She is otherwise clinically and systemically well  She remains afebrile with normal WBC  Intraoperative wound culture with growth of Aeromonas, Enterobacter cloacae, and Clostridium sporogenes    -discontinue IV Zosyn and transition to PO Bactrim DS q12 and Flagyl 500mg q8 for 10 day course post I&D through 10/28/2022  -monitor temperature and hemodynamics  -serial exam and close outpatient follow-up     2  Left hand laceration  This is secondary to fall with exposure to contaminated angela  She received TD booster  She is status post I&D  -wound care per Ortho     3  Right antecubital phlebitis  Diagnosed by exam  Recommend serial skin exams and supportive measures like heat and NSAIDs  4  Tobacco abuse  Recommend cessation  Antibiotics:  Postop day 5  D6 Zosyn    I have discussed the above management plan in detail with patient  I have discussed the above management plan in detail with patient's RN, and the primary service, ortho  Subjective:  Patient has no fever, chills, sweats; no nausea, vomiting, diarrhea; no cough, shortness of breath; no pain  No new symptoms  Ready for dc home       Objective:  Vitals:  Temp:  [97 5 °F (36 4 °C)-97 6 °F (36 4 °C)] 97 5 °F (36 4 °C)  HR:  [56-61] 61  Resp:  [18-19] 19  BP: (116-130)/(68-83) 127/83  SpO2:  [93 %-96 %] 96 %  Temp (24hrs), Av 5 °F (36 4 °C), Min:97 5 °F (36 4 °C), Max:97 6 °F (36 4 °C)  Current: Temperature: 97 5 °F (36 4 °C)    PHYSICAL EXAM:  General Appearance:  Appearing well, nontoxic, and in no distress   HEENT: Normocephalic, without obvious abnormality, atraumatic  Conjunctiva pink and sclera anicteric  Oropharynx moist without lesions  Lungs:   Clear to auscultation bilaterally, respirations unlabored   Heart:  RRR; no murmur, rub or gallop   Abdomen:   Soft, non-tender, non-distended, positive bowel sounds    Extremities: No cyanosis, clubbing or edema   Musculoskeletal: Back symmetric without curvature, ROM normal     : No CVA or suprapubic tenderness  No cunningham  Skin: No rashes or lesions  No draining wounds noted  Left hand wound with dressing and ace wrap in place without streaking drainage  Peripheral IV intact without evidence of erythema, warmth, or exudate  LABS, IMAGING, & OTHER STUDIES:  Lab Results:  I have personally reviewed pertinent labs    Results from last 7 days   Lab Units 10/24/22  0452 10/23/22  0510 10/21/22  0651   WBC Thousand/uL 7 21 7 71 9 32   HEMOGLOBIN g/dL 14 3 13 5 11 8   PLATELETS Thousands/uL 317 306 267     Results from last 7 days   Lab Units 10/24/22  0452 10/23/22  0510 10/21/22  0651   SODIUM mmol/L 137 136 137   POTASSIUM mmol/L 4 5 4 7 4 4   CHLORIDE mmol/L 102 99 104   CO2 mmol/L 29 30 29   BUN mg/dL 19 13 19   CREATININE mg/dL 0 74 0 74 0 73   EGFR ml/min/1 73sq m 91 91 93   CALCIUM mg/dL 9 9 9 5 8 9     Results from last 7 days   Lab Units 10/20/22  1404 10/19/22  1819   GRAM STAIN RESULT   --  No Polys or Bacteria seen   WOUND CULTURE   --  2+ Growth of Aeromonas hydrophila*  2+ Growth of Enterobacter cloacae*   MRSA CULTURE ONLY  No Methicillin Resistant Staphlyococcus aureus (MRSA) isolated  --          Results from last 7 days   Lab Units 10/19/22  1700   CRP mg/L 3 2*

## 2022-10-24 NOTE — TELEPHONE ENCOUNTER
Caller: Patient    Doctor: Rogelio Bcak    Reason for call: Surgery scheduler, questions    Call back#: n/a

## 2022-10-24 NOTE — NURSING NOTE
Pt DC to home via family in stable condition  All belongings packed and sent  AVS reviewed and sent

## 2022-10-24 NOTE — DISCHARGE SUMMARY
Discharge Summary - Phillip Harding 56 y o  female MRN: [id number]    Unit/Bed#: -01 Encounter: [id number]    Admission Date:   Admission Orders (From admission, onward)     Ordered        10/20/22 1608  Inpatient Admission  Once            10/19/22 1619  Place in Observation  Once                        Admitting Diagnosis: Cellulitis of left upper limb [L03 114]    Procedures Performed:  I and D of left hand    Summary of Hospital Course:     61-year-old female presented to the hospital for an I and D of her left hand  Approximately 5 days prior, she suffered a laceration along her left hand at work  She went to the emergency department that day and sutures were placed  After approximately 3 days, the patient started to develop signs of infection and was seen outpatient by Dr  Barrington Call  The risks and benefits of surgery were discussed with the patient and she decided on an I and D of her left hand  The patient underwent the procedure on 39/46/5860 without complications  She was placed on IV antibiotics and Infectious Disease was consulted  The patient continued to progress and on postop day 5, she was deemed suitable for discharge to home  The patient was discharged on Flagyl 500 mg Q 8hr and Bactrim DS q 12 hours  She may continue local wound care  She will follow up with Dr  Barrington Call approximately 1 week from now  Discharge Diagnosis:  Laceration of left hand with subsequent cellulitis, status post I and D of left hand    Condition at Discharge: stable         Discharge instructions/Information to patient and family:   See after visit summary for information provided to patient and family  Provisions for Follow-Up Care:  See after visit summary for information related to follow-up care and any pertinent home health orders  PCP: Maykel Hastings PA-C    Disposition: Home    Planned Readmission: No      Discharge Statement   I spent 30 minutes discharging the patient   This time was spent on the day of discharge  I had direct contact with the patient on the day of discharge  Additional documentation is required if more than 30 minutes were spent on discharge  Discharge Medications:  See after visit summary for reconciled discharge medications provided to patient and family

## 2022-10-24 NOTE — TELEPHONE ENCOUNTER
Caller: [employer]    Doctor: Earl Mcpherson    Reason for call: Making sure we have the billing correct information    [address] [place] [address]  Claim # [id number]    Call back#: [phone]

## 2022-10-24 NOTE — CASE MANAGEMENT
Case Management Discharge Planning Note    Patient name Yanira Stephenson  Location [id number] MRN [id number]  : 1965 Date 10/24/2022       Current Admission Date: 10/19/2022  Current Admission Diagnosis:Laceration of left hand with infection   Patient Active Problem List    Diagnosis Date Noted   • Erythema of skin 10/22/2022   • Cellulitis of left hand 10/19/2022   • Laceration of left hand with infection 10/19/2022   • Numbness and tingling 2022   • Depression, recurrent ([facility] ) 2022   • Tinea unguium 2022   • Tear of medial meniscus of right knee 2021   • Chronic pain of right knee 2021   • History of COVID-19 2020   • Other hyperlipidemia 10/17/2019   • Adult BMI 25 0-25 9 kg/sq m 10/17/2019   • Current smoker 10/17/2019   • Attention deficit hyperactivity disorder (ADHD), combined type 06/15/2019   • Elevated BP without diagnosis of hypertension 10/23/2018   • Major depression, single episode 2016   • Malignant [facility] of [facility] of [facility] 2014   • Insomnia 2013   • Irritable bowel syndrome 2013   • Anxiety 2013      LOS (days): 5  Geometric Mean LOS (GMLOS) (days): 3 20  Days to GMLOS:-0 6     OBJECTIVE:  Risk of Unplanned Readmission Score: 5 97         Current admission status: Inpatient   Preferred Pharmacy:   [address] [place], [place] [address], [address] [facility] [address] [address]  Phone: [phone] Fax: [phone]    [id number] [place] [address]  Phone: [phone] Fax: [phone]    Primary Care Provider: Ying Johnson PA-C    Primary Insurance: WORK[employer] COMPENSATION  Secondary Insurance:     DISCHARGE DETAILS:    Discharge planning discussed with[de-identified] patient   cm was instructed to not call her   she stated she called him     Comments - Freedom of Choice: pt denies any d/c needs- pt is in agreement with the d/c and d/c plan  CM contacted family/caregiver?: No- see comments (cm reviewed with pt only  cm was TOLD to not call her - she stated she called him)  Were Treatment Team discharge recommendations reviewed with patient/caregiver?: Yes (pt only as per pt)  Did patient/caregiver verbalize understanding of patient care needs?: Yes (pt only as per pt)  Were patient/caregiver advised of the risks associated with not following Treatment Team discharge recommendations?: Yes (pt only as per pt)         [address] [place] [address]         Is the patient interested in [place] [address] at discharge?: No    DME Referral Provided  Referral made for DME?: No    Other Referral/Resources/Interventions Provided:  Referral Comments: pt denied any d/c needs    Would you like to participate in our [facility] [address] service program?  : No - Declined    Treatment Team Recommendation: Home (home with spouse & outpt follow up- friend)  Discharge Destination Plan[de-identified] Home (home with spouse & outpt follow up- friend will transport)  Transport at Discharge : Automobile, Other (Comment) (friend)         pt was in agreement with the d/c and d/c plan              Accompanied by: Friend           Family notified[de-identified] patient called the spouse cm was told to NOT call her spouse

## 2022-10-26 ENCOUNTER — OFFICE VISIT (OUTPATIENT)
Dept: INTERNAL MEDICINE CLINIC | Facility: CLINIC | Age: 57
End: 2022-10-26
Payer: OTHER MISCELLANEOUS

## 2022-10-26 VITALS
WEIGHT: 135.13 LBS | OXYGEN SATURATION: 98 % | SYSTOLIC BLOOD PRESSURE: 122 MMHG | TEMPERATURE: 98.2 F | HEIGHT: 67 IN | DIASTOLIC BLOOD PRESSURE: 62 MMHG | HEART RATE: 97 BPM | BODY MASS INDEX: 21.21 KG/M2

## 2022-10-26 DIAGNOSIS — L03.114 CELLULITIS OF LEFT HAND: Primary | ICD-10-CM

## 2022-10-26 PROBLEM — L53.9 ERYTHEMA OF SKIN: Status: RESOLVED | Noted: 2022-10-22 | Resolved: 2022-10-26

## 2022-10-26 PROCEDURE — 99496 TRANSJ CARE MGMT HIGH F2F 7D: CPT | Performed by: PHYSICIAN ASSISTANT

## 2022-10-26 NOTE — PROGRESS NOTES
Transition of Care  Follow-up After Hospitalization    Analia Dubose 56 y o  female   Date:  10/26/2022    TCM Call     Date and time call was made  10/25/2022  1:15 PM    Hospital care reviewed  Records reviewed    Patient was hospitialized at  [address]    Date of Admission  10/19/22    Date of discharge  10/24/22    Diagnosis  laceration of left hand w/infection    Disposition  Home    Were the patients medications reviewed and updated  Yes    Current Symptoms  None      TCM Call     Post hospital issues  None    Should patient be enrolled in anticoag monitoring? No    Scheduled for follow up? Yes    Did you obtain your prescribed medications  Yes    Do you need help managing your prescriptions or medications  No    Is transportation to your appointment needed  No    I have advised the patient to call PCP with any new or worsening symptoms  caroline wen    Are you recieving any outpatient services  No    Are you recieving home care services  No    Are you using any community resources  No    Current waiver services  No    Have you fallen in the last 12 months  No    Interperter language line needed  No    Counseling  Patient    Counseling topics  Diagnostic results; instructions for management; Risk factor reduction; patient and family education; Prognosis; Activities of daily living; Importance of RX compliance        Admit Date: 10/19  Discharge Date: 10/24  Diagnosis: cellulitis left hand  Location: [facility] records were reviewed  Medications upon discharge reviewed/updated  Medication Changes: none  Imaging: none  Consults: ID  Discharge Disposition:  home  Follow up visits with other specialists: ortho      Assessment and Plan:    1  Cellulitis left hand: Much improved  Complete antibiotics  Keep orthopedic follow up  Stephy Zelaya was seen today for transition of care management  Diagnoses and all orders for this visit:    Cellulitis of left hand          HPI:  Pt presents for TCM  She was admitted after our last visit for significant cellulitis of her left hand that developed after falling in a dumpster filled with rotten meat and maggots and suffered a laceration  She was admitted on 10/19 and underwent I&D  She was started on IV vancomycin and transitioned to Flagyl and bactrim  She improved nicely and was discharged home on 10/24  She is doing much better  Wound is clean, dry and well healing with sutures in place  Swelling has resolved  Pain is much improved  She is also having bilateral knee pain as a result of the work injury, She has ortho follow up tomorrow  ROS: Review of Systems   Constitutional: Negative for chills and fever  HENT: Negative for congestion, ear pain, hearing loss, postnasal drip, rhinorrhea, sinus pressure, sinus pain, sore throat and trouble swallowing  Eyes: Negative for pain and visual disturbance  Respiratory: Negative for cough, chest tightness, shortness of breath and wheezing  Cardiovascular: Negative  Negative for chest pain, palpitations and leg swelling  Gastrointestinal: Negative for abdominal pain, blood in stool, constipation, diarrhea, nausea and vomiting  Endocrine: Negative for cold intolerance, heat intolerance, polydipsia, polyphagia and polyuria  Genitourinary: Negative for difficulty urinating, dysuria, flank pain and urgency  Musculoskeletal: Negative for arthralgias, back pain, gait problem and myalgias  Skin: Positive for wound  Negative for rash  Allergic/Immunologic: Negative  Neurological: Negative for dizziness, weakness, light-headedness and headaches  Hematological: Negative  Psychiatric/Behavioral: Negative for behavioral problems, dysphoric mood and sleep disturbance  The patient is not nervous/anxious          Past Medical History:   Diagnosis Date   • ADD (attention deficit disorder) 2019   • Anxiety 05/31/2013   • Herpes simplex     EXTERNAL   • IBS (irritable bowel syndrome)    • Insomnia    • Jaundice    • Malignant melanoma in situ (HCC)    • Pneumonia    • PONV (postoperative nausea and vomiting)    • Reactive airway disease        Past Surgical History:   Procedure Laterality Date   • AUGMENTATION BREAST     • AUGMENTATION MAMMAPLASTY Bilateral 28years ago   • HI KNEE SCOPE,MED/LAT MENISECTOMY Right 04/06/2021    Procedure: KNEE ARTHROSCOPY, medial lateral menisectomy,chondroplasty,synovectomy,inj;  Surgeon: Blane Holter, DO;  Location: [address] [place] [address] MAIN OR;  Service: Orthopedics   • SKIN BIOPSY  2013    2 PUNCH BIOPSIES   • SKIN LESION EXCISION Right 2013    LEG   • TONSILLECTOMY     • TUBAL LIGATION     • WOUND DEBRIDEMENT Left 10/19/2022    Procedure: DEBRIDEMENT WOUND AND DRESSING CHANGE [facility]); Surgeon: Vernon Schwartz MD;  Location: [place] MAIN OR;  Service: Orthopedics       Social History     Socioeconomic History   • Marital status: /Civil Union     Spouse name: None   • Number of children: None   • Years of education: None   • Highest education level: None   Occupational History   • Occupation: EMPLOYED   • Occupation: [employer]   Tobacco Use   • Smoking status: Current Every Day Smoker     Packs/day: 1 00     Years: 42 00     Pack years: 42 00     Types: Cigarettes   • Smokeless tobacco: Never Used   Vaping Use   • Vaping Use: Never used   Substance and Sexual Activity   • Alcohol use: Never   • Drug use: Never   • Sexual activity: Yes     Partners: Male   Other Topics Concern   • None   Social History Narrative    EMPLOYED     Social Determinants of Health     Financial Resource Strain: Not on file   Food Insecurity: No Food Insecurity   • Worried About Running Out of Food in the Last Year: Never true   • Ran Out of Food in the Last Year: Never true   Transportation Needs: No Transportation Needs   • Lack of Transportation (Medical): No   • Lack of Transportation (Non-Medical):  No   Physical Activity: Not on file   Stress: Not on file   Social Connections: Not on file   Intimate Partner Violence: Not on file   Housing Stability: Low Risk    • Unable to Pay for Housing in the Last Year: No   • Number of Places Lived in the Last Year: 1   • Unstable Housing in the Last Year: No       Family History   Problem Relation Age of Onset   • Heart disease Father    • Diabetes Other         MELLITUS   • Hyperlipidemia Other    • No Known Problems Mother    • No Known Problems Maternal Grandmother    • No Known Problems Paternal Grandmother    • No Known Problems Maternal Aunt    • No Known Problems Maternal Aunt    • No Known Problems Maternal Aunt        Allergies   Allergen Reactions   • Bacitracin Rash   • Neomycin Rash   • Polymyxin B Rash   • Vilazodone Other (See Comments)     Other reaction(s): abdomen pain         Current Outpatient Medications:   •  albuterol (PROVENTIL HFA,VENTOLIN HFA) 90 mcg/act inhaler, INHALE 1 PUFF BY MOUTH EVERY 6 HOURS AS NEEDED FOR WHEEZE, Disp: 6 7 Inhaler, Rfl: 5  •  B Complex Vitamins (VITAMIN B COMPLEX PO), Take 1 tablet by mouth in the morning, Disp: , Rfl:   •  escitalopram (LEXAPRO) 20 mg tablet, Take 1 tablet (20 mg total) by mouth daily, Disp: 90 tablet, Rfl: 1  •  ibuprofen (MOTRIN) 600 mg tablet, Take 600 mg by mouth every 6 (six) hours as needed, Disp: , Rfl:   •  lisdexamfetamine (VYVANSE) 30 MG capsule, Take 1 capsule (30 mg total) by mouth every morning Max Daily Amount: 30 mg, Disp: 90 capsule, Rfl: 0  •  lisdexamfetamine (VYVANSE) 50 MG capsule, Take 1 capsule (50 mg total) by mouth every morning Max Daily Amount: 50 mg, Disp: 90 capsule, Rfl: 0  •  metroNIDAZOLE (FLAGYL) 500 mg tablet, Take 1 tablet (500 mg total) by mouth every 8 (eight) hours for 14 days, Disp: 42 tablet, Rfl: 0  •  oxyCODONE-acetaminophen (Percocet) 5-325 mg per tablet, Take 1 tablet by mouth every 4 (four) hours as needed for moderate pain for up to 10 days Max Daily Amount: 6 tablets, Disp: 14 tablet, Rfl: 0  •  PreviDent 5000 Sensitive 1 1-5 % GEL, BRUSH 1 TIME AT NIGHT FOR ONE MIN NOTHING MY MOUTH FOR 30 MIN, Disp: , Rfl:   •  sulfamethoxazole-trimethoprim (BACTRIM DS) 800-160 mg per tablet, Take 1 tablet by mouth every 12 (twelve) hours for 14 days, Disp: 28 tablet, Rfl: 0  •  zolpidem (AMBIEN CR) 6 25 MG CR tablet, Take 1 tablet (6 25 mg total) by mouth daily at bedtime as needed for sleep, Disp: 30 tablet, Rfl: 5  •  bimatoprost (LATISSE) 0 03 % ophthalmic solution, PLACE 2 DROPS BOTH EYES AT BEDTIME APPLY EVENLY ALONG WITH SKIN ON THE UPPER EYELID AT BASE OF EYELASHES ONCE DAILY AT BEDTIME REPEAT FOR SECOND EYE (Patient not taking: No sig reported), Disp: 5 mL, Rfl: 6  •  valACYclovir (VALTREX) 1,000 mg tablet, Take 1 tablet (1,000 mg total) by mouth 3 (three) times a day for 7 days (Patient taking differently: Take 1,000 mg by mouth 3 (three) times a day as needed Cold sore flares), Disp: 21 tablet, Rfl: 3      Physical Exam:  /62 (BP Location: Left arm, Patient Position: Sitting)   Pulse 97   Temp 98 2 °F (36 8 °C)   Ht 5' 6 5" (1 689 m)   Wt 61 3 kg (135 lb 2 oz)   LMP 02/01/2021   SpO2 98%   BMI 21 48 kg/m²     Physical Exam  Constitutional:       General: She is not in acute distress  Appearance: She is well-developed  She is not diaphoretic  HENT:      Head: Normocephalic and atraumatic  Right Ear: External ear normal       Left Ear: External ear normal       Nose: Nose normal       Mouth/Throat:      Pharynx: No oropharyngeal exudate  Eyes:      General: No scleral icterus  Right eye: No discharge  Left eye: No discharge  Conjunctiva/sclera: Conjunctivae normal       Pupils: Pupils are equal, round, and reactive to light  Neck:      Thyroid: No thyromegaly  Cardiovascular:      Rate and Rhythm: Normal rate and regular rhythm  Heart sounds: Normal heart sounds  No murmur heard  No friction rub  No gallop  Pulmonary:      Effort: Pulmonary effort is normal  No respiratory distress        Breath sounds: Normal breath sounds  No wheezing or rales  Abdominal:      General: Bowel sounds are normal  There is no distension  Palpations: Abdomen is soft  Tenderness: There is no abdominal tenderness  Musculoskeletal:         General: No tenderness or deformity  Normal range of motion  Cervical back: Normal range of motion and neck supple  Skin:     General: Skin is warm and dry  Neurological:      Mental Status: She is alert and oriented to person, place, and time  Cranial Nerves: No cranial nerve deficit  Psychiatric:         Behavior: Behavior normal          Thought Content:  Thought content normal          Judgment: Judgment normal              Labs:  Lab Results   Component Value Date    WBC 7 21 10/24/2022    HGB 14 3 10/24/2022    HCT 43 3 10/24/2022    MCV 90 10/24/2022     10/24/2022     Lab Results   Component Value Date    K 4 5 10/24/2022     10/24/2022    CO2 29 10/24/2022    BUN 19 10/24/2022    CREATININE 0 74 10/24/2022    GLUF 92 10/04/2022    CALCIUM 9 9 10/24/2022    AST 13 10/04/2022    ALT 23 10/04/2022    ALKPHOS 67 10/04/2022    EGFR 91 10/24/2022

## 2022-10-27 ENCOUNTER — TELEPHONE (OUTPATIENT)
Dept: OBGYN CLINIC | Facility: HOSPITAL | Age: 57
End: 2022-10-27

## 2022-10-27 ENCOUNTER — OFFICE VISIT (OUTPATIENT)
Dept: OBGYN CLINIC | Facility: CLINIC | Age: 57
End: 2022-10-27

## 2022-10-27 VITALS
BODY MASS INDEX: 21.66 KG/M2 | HEART RATE: 91 BPM | WEIGHT: 138 LBS | SYSTOLIC BLOOD PRESSURE: 155 MMHG | HEIGHT: 67 IN | DIASTOLIC BLOOD PRESSURE: 97 MMHG

## 2022-10-27 DIAGNOSIS — S61.432A PUNCTURE WOUND OF LEFT HAND WITH INFECTION, INITIAL ENCOUNTER: Primary | ICD-10-CM

## 2022-10-27 DIAGNOSIS — L08.9 PUNCTURE WOUND OF LEFT HAND WITH INFECTION, INITIAL ENCOUNTER: Primary | ICD-10-CM

## 2022-10-27 PROCEDURE — 99024 POSTOP FOLLOW-UP VISIT: CPT | Performed by: ORTHOPAEDIC SURGERY

## 2022-10-27 NOTE — LETTER
October 27, 2022     Patient: Amee Mejia  YOB: 1965  Date of Visit: 10/27/2022      To Whom it May Concern:    Amee Mejia is under my professional care  Bella Simpson was seen in my office on 10/27/2022  Bella Simpson requires additional medical care and is not cleared to work for the next 2 weeks  More specific timeline for return will be provided pending further evaluation  If you have any questions or concerns, please don't hesitate to call           Sincerely,          Brannon Thomas MD

## 2022-10-27 NOTE — PROGRESS NOTES
Assessment:   S/P: Right hand I&D done on 10/19/22    Additionally she bilateral knee contusions related to the same fall that caused her hand wound  Plan:   Her hand infection continues to improve  There is a small area of superficial skin that appears to be starting to slough off  I do not believe that there is an ongoing deep infection requiring further debridement at this time, however, we are not out of the window where this could recur  I want to see her again early next week to make sure there is no new signs of return of infection  She is going to continue taking her antibiotics as prescribed and perform dry dressing changes at home  She may wash the hand and replaced the dressing after a shower  I provided a letter to keep her out of work for another 2 weeks as her hand is not functioning well and there is a risk of contamination at work as well  She does have pain in the anterior aspect of bilateral knees from the same fall that caused hand wound  She noticed this early after the initial fall but the pain in her hand to parity  At this time I do not see concerning signs of instability or additional injury  However she does not improve over the next few weeks I may consider additional imaging and workup  CHIEF COMPLAINT:  Chief Complaint   Patient presents with   • Left Hand - Follow-up, Post-op         SUBJECTIVE:  Kendy Sadler is a 56y o  year old female who presents for follow up after left hand I and D for a necrotizing infection of a highly contaminated wound  Overall the pain continues to improve she has not noticed drainage besides a small amount of bloody drainage  Denies any fever or chills  Has been working on range of motion and to address his home      PHYSICAL EXAMINATION:  General: well developed and well nourished, alert and oriented times 3  Psychiatric: Normal    MUSCULOSKELETAL EXAMINATION:  Incision:  Healing continues without sign of recurrent infection at this time   The skin at the edges of the incision are beginning to slough off  No drainage  Range of Motion: Limited due to pain    Her thumb range of motion is improving but she remains hesitant because the pain  Neurovascular status: Neuro intact, good cap refill

## 2022-10-27 NOTE — TELEPHONE ENCOUNTER
Caller: Patient    Doctor: Zeeshan Kent    Reason for call: Patient states Dr Zeeshan Kent discussed seeing an Infectious Disease doctor at her office visit  today   Patient would like to have more clarification     Call back#: [phone]

## 2022-10-31 ENCOUNTER — OFFICE VISIT (OUTPATIENT)
Dept: OBGYN CLINIC | Facility: CLINIC | Age: 57
End: 2022-10-31

## 2022-10-31 VITALS
DIASTOLIC BLOOD PRESSURE: 67 MMHG | HEART RATE: 56 BPM | BODY MASS INDEX: 21.66 KG/M2 | SYSTOLIC BLOOD PRESSURE: 148 MMHG | WEIGHT: 138 LBS | HEIGHT: 67 IN

## 2022-10-31 DIAGNOSIS — S61.432A PUNCTURE WOUND OF LEFT HAND WITH INFECTION, INITIAL ENCOUNTER: Primary | ICD-10-CM

## 2022-10-31 DIAGNOSIS — L08.9 PUNCTURE WOUND OF LEFT HAND WITH INFECTION, INITIAL ENCOUNTER: Primary | ICD-10-CM

## 2022-10-31 NOTE — PROGRESS NOTES
Assessment:   S/P: Right hand I&D done on 10/19/22    Additionally she bilateral knee contusions related to the same fall that caused her hand wound  Plan:   Her hand infection continues to improve  Skin is dry, some areas beginning to slough  I do not believe that there is an ongoing deep infection requiring further debridement at this time  Continuing covering wound  Begin showering  Instructed on daily home exercises  Continue to monitor knees for now  CHIEF COMPLAINT:  Chief Complaint   Patient presents with   • Left Hand - Post-op         SUBJECTIVE:  Rick Julian is a 56y o  year old female who presents for follow up after left hand I and D for a necrotizing infection of a highly contaminated wound  Pain improving  Motion improving  No drainage    PHYSICAL EXAMINATION:  General: well developed and well nourished, alert and oriented times 3  Psychiatric: Normal    MUSCULOSKELETAL EXAMINATION:  Incision:  Healing continues without sign of recurrent infection at this time  The skin at the edges of the incision are beginning to slough off  No drainage  Range of Motion: Limited due to pain    Her thumb range of motion is improving but she remains hesitant because the pain  Neurovascular status: Neuro intact, good cap refill

## 2022-11-01 ENCOUNTER — TELEPHONE (OUTPATIENT)
Dept: OBGYN CLINIC | Facility: HOSPITAL | Age: 57
End: 2022-11-01

## 2022-11-01 NOTE — TELEPHONE ENCOUNTER
Caller: [facility] [address] [facility]    Doctor: Tiffany Soriano    Reason for call: They wanted the office notes from 10/27 & 10/31  Faxed:[phone]  I did fax them over today      Call back#: [phone]

## 2022-11-09 ENCOUNTER — OFFICE VISIT (OUTPATIENT)
Dept: OBGYN CLINIC | Facility: CLINIC | Age: 57
End: 2022-11-09

## 2022-11-09 VITALS
SYSTOLIC BLOOD PRESSURE: 124 MMHG | DIASTOLIC BLOOD PRESSURE: 80 MMHG | BODY MASS INDEX: 21.66 KG/M2 | WEIGHT: 138 LBS | HEIGHT: 67 IN

## 2022-11-09 DIAGNOSIS — L08.9 PUNCTURE WOUND OF LEFT HAND WITH INFECTION, INITIAL ENCOUNTER: Primary | ICD-10-CM

## 2022-11-09 DIAGNOSIS — S61.432A PUNCTURE WOUND OF LEFT HAND WITH INFECTION, INITIAL ENCOUNTER: Primary | ICD-10-CM

## 2022-11-09 DIAGNOSIS — S80.01XD CONTUSION OF RIGHT KNEE, SUBSEQUENT ENCOUNTER: ICD-10-CM

## 2022-11-09 NOTE — LETTER
November 9, 2022     Patient: Faith Mcdonald  YOB: 1965  Date of Visit: 11/9/2022      To Whom it May Concern:    Faith Mcdonald is under my professional care  Vega Gallegos was seen in my office on 11/9/2022  Darin Ozuna return to light duty at work  She is not allowed to lift any objects heavier than a coffee cup with the left hand  She must be allowed to keep the hand covered and protected at work  She should not be working near contaminated or wet surfaces for the next 2 weeks  If you have any questions or concerns, please don't hesitate to call           Sincerely,          Kailey Barrera MD

## 2022-11-09 NOTE — PROGRESS NOTES
Assessment:   S/P: Right hand I&D done on 10/19/22    Additionally she bilateral knee contusions related to the same fall that caused her hand wound  Plan:   Her hand infection continues to improve  I recommended starting hand therapy to improve her range of motion and strength  I recommend that she be allowed to return to work on light duty only  I recommend that she not lift anything heavier than a coffee cup  I also recommended she avoid any contaminated or wet surfaces with that wound until it is completely healed  I recommend light duty for another 2 weeks and then return to full duty  Continues to have pain in the right knee  She has intermittent episodes with the knee feels like it locks for 2nd has acute pain  She does have a previous history of knee scope on the side but this is pain that all started with the recent injury  I recommended starting a course of physical therapy for the knee as well with strengthening of the thigh and hip musculature as well as stretching of the ITB and hamstrings  She agreed will start this course  If the knee does not significantly improve with therapy we may consider additional imaging  CHIEF COMPLAINT:  Chief Complaint   Patient presents with   • Left Hand - Follow-up         SUBJECTIVE:  Raudel Dumont is a 56y o  year old female following up for hand wound  Feels much better  No drainage  Moving thumb better  Still feels weak and limited motion  Knee continues to hurt on occasion as well  PHYSICAL EXAMINATION:  General: well developed and well nourished, alert and oriented times 3  Psychiatric: Normal    MUSCULOSKELETAL EXAMINATION:  Incision:  Healing continues without sign of recurrent infection at this time  Still scan in place  No drainage  Sutures removed  Range of Motion: Limited due to pain    Her thumb range of motion is improving but she remains hesitant because the pain  Neurovascular status: Neuro intact, good cap refill      Right knee with mild effusion  Range of motion from 0-130  Mild medial and lateral joint line tenderness  Knee is stable to varus stress, valgus stress, Lachman's and posterior drawer  There is pain with deep flexion  Neurovascular intact distally

## 2022-11-16 ENCOUNTER — TELEPHONE (OUTPATIENT)
Dept: OBGYN CLINIC | Facility: HOSPITAL | Age: 57
End: 2022-11-16

## 2022-11-16 NOTE — TELEPHONE ENCOUNTER
Caller: [facility]    Doctor: Abagail Bamberger    Reason for call: please fax hand OT script to [facility] Fax # [phone]

## 2022-11-16 NOTE — TELEPHONE ENCOUNTER
Caller: [facility]    Doctor/Office: Neetu Betancourt     CB#: [phone]      What needs to be faxed: PT/OT script for hand     ATTN to: Abdullahi Knapp     Fax#: [phone]

## 2022-11-17 NOTE — TELEPHONE ENCOUNTER
Caller: Fili Ortega    Doctor/Office: Edi    CB#:       What needs to be faxed: PT/OT script from 11/9/22    ATTN to: Duane Healy    Fax#: [phone]      Documents were successfully e-faxed

## 2022-11-18 DIAGNOSIS — F90.2 ATTENTION DEFICIT HYPERACTIVITY DISORDER (ADHD), COMBINED TYPE: ICD-10-CM

## 2022-12-12 ENCOUNTER — TELEPHONE (OUTPATIENT)
Dept: OBGYN CLINIC | Facility: HOSPITAL | Age: 57
End: 2022-12-12

## 2022-12-12 NOTE — TELEPHONE ENCOUNTER
Caller: Merline Root @  [facility]    Doctor: Lew Blank    Reason for call: wanted to seen when the patients last visit was and her next appt      Call back#: [phone]

## 2022-12-14 ENCOUNTER — TELEPHONE (OUTPATIENT)
Dept: OBGYN CLINIC | Facility: HOSPITAL | Age: 57
End: 2022-12-14

## 2022-12-14 NOTE — TELEPHONE ENCOUNTER
Caller: Luis Mckinney    Doctor: Missael hWite    Reason for call: caling for an updated PT/OT order with specific frequency and duration, per workers comp requirements    Please fax to [facility] at [phone]    Call back#: [phone] ext [phone]

## 2022-12-15 DIAGNOSIS — S61.432A PUNCTURE WOUND OF LEFT HAND WITH INFECTION, INITIAL ENCOUNTER: Primary | ICD-10-CM

## 2022-12-15 DIAGNOSIS — L08.9 PUNCTURE WOUND OF LEFT HAND WITH INFECTION, INITIAL ENCOUNTER: Primary | ICD-10-CM

## 2022-12-18 PROBLEM — L08.9 LACERATION OF LEFT HAND WITH INFECTION: Status: RESOLVED | Noted: 2022-10-19 | Resolved: 2022-12-18

## 2022-12-18 PROBLEM — S61.412A LACERATION OF LEFT HAND WITH INFECTION: Status: RESOLVED | Noted: 2022-10-19 | Resolved: 2022-12-18

## 2022-12-21 ENCOUNTER — APPOINTMENT (OUTPATIENT)
Dept: RADIOLOGY | Facility: CLINIC | Age: 57
End: 2022-12-21

## 2022-12-21 ENCOUNTER — OFFICE VISIT (OUTPATIENT)
Dept: OBGYN CLINIC | Facility: CLINIC | Age: 57
End: 2022-12-21

## 2022-12-21 VITALS
SYSTOLIC BLOOD PRESSURE: 164 MMHG | BODY MASS INDEX: 22.13 KG/M2 | WEIGHT: 141 LBS | DIASTOLIC BLOOD PRESSURE: 91 MMHG | HEART RATE: 80 BPM | HEIGHT: 67 IN

## 2022-12-21 DIAGNOSIS — G89.29 CHRONIC PAIN OF RIGHT KNEE: Primary | ICD-10-CM

## 2022-12-21 DIAGNOSIS — S83.241A OTHER TEAR OF MEDIAL MENISCUS, CURRENT INJURY, RIGHT KNEE, INITIAL ENCOUNTER: ICD-10-CM

## 2022-12-21 DIAGNOSIS — M25.561 CHRONIC PAIN OF RIGHT KNEE: Primary | ICD-10-CM

## 2022-12-21 DIAGNOSIS — M25.561 CHRONIC PAIN OF RIGHT KNEE: ICD-10-CM

## 2022-12-21 DIAGNOSIS — G89.29 CHRONIC PAIN OF RIGHT KNEE: ICD-10-CM

## 2022-12-21 NOTE — PROGRESS NOTES
Assessment/Plan:  1  Left Hand Infection, s/p wound debridement  I recommend continuing PT/OT Hand therapy to continue progressing strength and functional capability  Patient can continue massaging the incision site with therapy, but recommend against overdoing the massaging  The patient is cleared for full work duties  2  Left forearm cyst  I recommend monitoring the cyst for pain and changing size  As long as the cyst is not causing the patient any pain, I believe there is no need for further intervention at this time  3  Right knee pain   MRI right knee  She had a traumatic injury  Has done PT for several months now and continues to have intermittent pain localized to the lateral joint line  There is minimal degenerative changes on Xray  Exams shows +joint line tenderness and a +Raza    Follow up after MRI for review of MRI  Subjective   Chief Complaint:   Chief Complaint   Patient presents with   • Left Hand - Follow-up       Johnny Hernandez is a 56 y o  female who presents for follow up for left hand infection, s/p wound debridement on 10/19/2022  Patient reports fatigue in the left hand with full activity at work, including heavy lifting up to 80 lbs  She also notes intermittent tingling into the first three fingers, usually in the morning  She notes sensitivity to heat directly around the incision site  Overall, she reports improvement in  strength and function with the left hand as she is able to do her normal work duties  The patient reports that she recently noticed a pinpoint bump proximal to the incision site on the left forearm  The bump does not cause the patient any pain and she is not sure whether the bump was present before or after the injury, but now that she has been doing therapy on the hand, the bump is more noticeable and increasing in size  Geovanna Cummings also reports ongoing, intermittent right knee pain since the same injury   At its worst, the knee pain wakes her up at night and at other times, does not bother her at all  Patient reports the pain is worst with weight-bearing and activity, specifically going down stairs  The patient has been focusing on this pain with physical therapy, but does not note any improvement  Review of Systems  ROS:    See HPI for musculoskeletal review  All other systems reviewed are negative     History:  Past Medical History:   Diagnosis Date   • ADD (attention deficit disorder) 2019   • Anxiety 05/31/2013   • Herpes simplex     EXTERNAL   • IBS (irritable bowel syndrome)    • Insomnia    • Jaundice    • Malignant melanoma in situ (HCC)    • Pneumonia    • PONV (postoperative nausea and vomiting)    • Reactive airway disease      Past Surgical History:   Procedure Laterality Date   • AUGMENTATION BREAST     • AUGMENTATION MAMMAPLASTY Bilateral 28years ago   • KS KNEE SCOPE,MED/LAT MENISECTOMY Right 04/06/2021    Procedure: KNEE ARTHROSCOPY, medial lateral menisectomy,chondroplasty,synovectomy,inj;  Surgeon: Clyde Rios DO;  Location: [address] [place] [address] MAIN OR;  Service: Orthopedics   • SKIN BIOPSY  2013    2 PUNCH BIOPSIES   • SKIN LESION EXCISION Right 2013    LEG   • TONSILLECTOMY     • TUBAL LIGATION     • WOUND DEBRIDEMENT Left 10/19/2022    Procedure: DEBRIDEMENT WOUND AND DRESSING CHANGE [facility]);   Surgeon: Tricia Talbot MD;  Location: [facility] MAIN [place];  Service: Orthopedics     Social History   Social History     Substance and Sexual Activity   Alcohol Use Never     Social History     Substance and Sexual Activity   Drug Use Never     Social History     Tobacco Use   Smoking Status Every Day   • Packs/day: 1 00   • Years: 42 00   • Pack years: 42 00   • Types: Cigarettes   Smokeless Tobacco Never     Family History:   Family History   Problem Relation Age of Onset   • Heart disease Father    • Diabetes Other         MELLITUS   • Hyperlipidemia Other    • No Known Problems Mother    • No Known Problems Maternal Grandmother    • No Known Problems Paternal Grandmother    • No Known Problems Maternal Aunt    • No Known Problems Maternal Aunt    • No Known Problems Maternal Aunt        Meds/Allergies   (Not in a hospital admission)    Allergies   Allergen Reactions   • Bacitracin Rash   • Neomycin Rash   • Polymyxin B Rash   • Vilazodone Other (See Comments)     Other reaction(s): abdomen pain          Objective     /91   Pulse 80   Ht 5' 6 5" (1 689 m)   Wt 64 kg (141 lb)   LMP 02/01/2021   BMI 22 42 kg/m²      PE:  AAOx 3  Well nourished   no audible wheezing  no abdominal distension  LE compartments soft, skin intact    Left Hand Exam:  Incision: no signs of infection at this time  Healing well  Some hypersensitivity around the scar  Motion continues to improve and is now near normal  2mm cyst over the 1st dorsal compartment with no tenderness   +AIN, PIN, ulnar motor  SILT    Right Knee Exam:   No significant skin lesions or deformity  No effusion  Range of motion from full extension to full flexion  Lateral joint line tenderness today  Positive Raza with a click  Knee is stable to varus stress, valgus stress, Lachman, and posterior drawer  Neurovascularly intact distally      Imaging Studies:     X-rays of the bilaterals knees reviewed and interpreted  These show no fractures  No significant degenerative changes   Patella well centered on the trochlea

## 2022-12-23 ENCOUNTER — TELEPHONE (OUTPATIENT)
Dept: OBGYN CLINIC | Facility: HOSPITAL | Age: 57
End: 2022-12-23

## 2022-12-23 NOTE — TELEPHONE ENCOUNTER
[facility]    Doctor/Office: Edi FIERRO#: n/a      What needs to be faxed: 12/21 office note    ATTN to: Ashutosh Parents    Fax#: [phone]      Documents were successfully e-faxed

## 2022-12-29 ENCOUNTER — TELEPHONE (OUTPATIENT)
Dept: OBGYN CLINIC | Facility: HOSPITAL | Age: 57
End: 2022-12-29

## 2022-12-29 NOTE — TELEPHONE ENCOUNTER
Caller: Patient    Doctor: Cathryn Diaz    Reason for call: patient is getting her MRI on 01/04, she will have the results faxed over to the Dr     Call back#: [phone]

## 2023-01-09 DIAGNOSIS — F41.9 ANXIETY: ICD-10-CM

## 2023-01-09 DIAGNOSIS — F32.A DEPRESSIVE DISORDER: ICD-10-CM

## 2023-01-09 RX ORDER — ESCITALOPRAM OXALATE 20 MG/1
TABLET ORAL
Qty: 90 TABLET | Refills: 1 | Status: SHIPPED | OUTPATIENT
Start: 2023-01-09

## 2023-01-11 ENCOUNTER — OFFICE VISIT (OUTPATIENT)
Dept: OBGYN CLINIC | Facility: CLINIC | Age: 58
End: 2023-01-11

## 2023-01-11 VITALS
BODY MASS INDEX: 22.42 KG/M2 | HEIGHT: 67 IN | HEART RATE: 105 BPM | DIASTOLIC BLOOD PRESSURE: 95 MMHG | SYSTOLIC BLOOD PRESSURE: 168 MMHG

## 2023-01-11 DIAGNOSIS — M17.10 PRIMARY OSTEOARTHRITIS OF KNEE, UNSPECIFIED LATERALITY: Primary | ICD-10-CM

## 2023-01-11 NOTE — PROGRESS NOTES
Assessment/Plan:  Right knee degenerative joint disease, left hand infected wound    We had a long discussion that the MRI findings are likely secondary to degenerative changes in the knee and previous partial meniscectomy  I do not recommend any surgery at this time  I recommend that the patient finish her course of physical therapy and transition to a home exercise program to maintain her strength  Her hand is doing very well  Finish course of hand therapy  The patient can plan to follow up as needed and we can consider other interventions in the future given the patient's symptoms fail to improve or worsen  Subjective   Chief Complaint:   Chief Complaint   Patient presents with   • Right Knee - Follow-up     MRI report       Abilio Overton is a 56 y o  female who presents for follow up for intermittent right knee pain since her work injury on 10/19/22  She reports the pain remains worst with weight bearing and going down stairs  Some days the patient reports no pain even with these activities  She notes that PT has not seemed to help the knee pain  She has not taken any over the counter medications for pain or worn a knee brace since the injury  She did have a right knee medial menisectomy in 3/2021  Tejinder Willis reports her left hand tingling and sensitivity to extreme temperatures has greatly improved  The patient reports some pain with heavy lifting that directly contacts the incision  She has a couple more sessions with OT before being discharged by them  Review of Systems  ROS:    See HPI for musculoskeletal review     All other systems reviewed are negative     History:  Past Medical History:   Diagnosis Date   • ADD (attention deficit disorder) 2019   • Anxiety 05/31/2013   • Herpes simplex     EXTERNAL   • IBS (irritable bowel syndrome)    • Insomnia    • Jaundice    • Malignant melanoma in situ (HCC)    • Pneumonia    • PONV (postoperative nausea and vomiting)    • Reactive airway disease      Past Surgical History:   Procedure Laterality Date   • AUGMENTATION BREAST     • AUGMENTATION MAMMAPLASTY Bilateral 28years ago   • ME ARTHRS KNE SURG W/MENISCECTOMY MED/LAT W/SHVG Right 04/06/2021    Procedure: KNEE ARTHROSCOPY, medial lateral menisectomy,chondroplasty,synovectomy,inj;  Surgeon: Daren Pena DO;  Location: [address] [place] [address] MAIN OR;  Service: Orthopedics   • SKIN BIOPSY  2013    2 PUNCH BIOPSIES   • SKIN LESION EXCISION Right 2013    LEG   • TONSILLECTOMY     • TUBAL LIGATION     • WOUND DEBRIDEMENT Left 10/19/2022    Procedure: DEBRIDEMENT WOUND AND DRESSING CHANGE [facility]);   Surgeon: Jerzy Eugene MD;  Location: [facility] [place];  Service: Orthopedics     Social History   Social History     Substance and Sexual Activity   Alcohol Use Never     Social History     Substance and Sexual Activity   Drug Use Never     Social History     Tobacco Use   Smoking Status Every Day   • Packs/day: 1 00   • Years: 42 00   • Pack years: 42 00   • Types: Cigarettes   Smokeless Tobacco Never     Family History:   Family History   Problem Relation Age of Onset   • Heart disease Father    • Diabetes Other         MELLITUS   • Hyperlipidemia Other    • No Known Problems Mother    • No Known Problems Maternal Grandmother    • No Known Problems Paternal Grandmother    • No Known Problems Maternal Aunt    • No Known Problems Maternal Aunt    • No Known Problems Maternal Aunt        Meds/Allergies   (Not in a hospital admission)    Allergies   Allergen Reactions   • Bacitracin Rash   • Neomycin Rash   • Polymyxin B Rash   • Vilazodone Other (See Comments)     Other reaction(s): abdomen pain          Objective     /95 (BP Location: Left arm, Patient Position: Sitting, Cuff Size: Standard)   Pulse 105   Ht 5' 6 5" (1 689 m)   LMP 02/01/2021   BMI 22 42 kg/m²      PE:  AAOx 3  Well nourished   no audible wheezing  no abdominal distension  LE compartments soft, skin intact    Knee Exam:   No significant skin lesions or deformity  No effusion  Range of motion from full extension to full flexion without pain  No joint line tenderness   Knee is stable to varus stress, valgus stress, Lachman, and posterior drawer  Neurovascularly intact distally    Hand wound well healed without sign of ongoing infection    Imaging Studies:     MRI of the right knee reviewed and interpreted  These show patellofemoral osteoarthritis, medial meniscus truncation likely secondary to post-operative changes, and lateral meniscus truncation likely secondary to degenerative changes

## 2023-01-12 ENCOUNTER — TELEPHONE (OUTPATIENT)
Dept: OBGYN CLINIC | Facility: HOSPITAL | Age: 58
End: 2023-01-12

## 2023-01-12 NOTE — TELEPHONE ENCOUNTER
Caller: Martin Martinez     Doctor: Jaclyn Max     Reason for call: Calling to get work status letter for patient     Fax # [phone]    Call back#: [phone]

## 2023-02-06 DIAGNOSIS — B00.1 COLD SORE: ICD-10-CM

## 2023-02-06 RX ORDER — VALACYCLOVIR HYDROCHLORIDE 1 G/1
1000 TABLET, FILM COATED ORAL 3 TIMES DAILY
Qty: 21 TABLET | Refills: 0 | Status: SHIPPED | OUTPATIENT
Start: 2023-02-06 | End: 2023-02-13

## 2023-02-09 DIAGNOSIS — R06.02 SOB (SHORTNESS OF BREATH): ICD-10-CM

## 2023-02-09 DIAGNOSIS — R06.2 WHEEZING: ICD-10-CM

## 2023-02-09 RX ORDER — ALBUTEROL SULFATE 90 UG/1
1 AEROSOL, METERED RESPIRATORY (INHALATION) EVERY 6 HOURS PRN
Qty: 18 G | Refills: 1 | Status: SHIPPED | OUTPATIENT
Start: 2023-02-09

## 2023-02-21 DIAGNOSIS — F90.2 ATTENTION DEFICIT HYPERACTIVITY DISORDER (ADHD), COMBINED TYPE: ICD-10-CM

## 2023-05-31 DIAGNOSIS — F90.2 ATTENTION DEFICIT HYPERACTIVITY DISORDER (ADHD), COMBINED TYPE: ICD-10-CM

## 2023-07-05 DIAGNOSIS — B00.1 COLD SORE: ICD-10-CM

## 2023-07-05 DIAGNOSIS — F41.9 ANXIETY: ICD-10-CM

## 2023-07-05 DIAGNOSIS — G47.00 INSOMNIA, UNSPECIFIED TYPE: ICD-10-CM

## 2023-07-05 DIAGNOSIS — F32.A DEPRESSIVE DISORDER: ICD-10-CM

## 2023-07-06 DIAGNOSIS — F41.9 ANXIETY: ICD-10-CM

## 2023-07-06 DIAGNOSIS — F32.A DEPRESSIVE DISORDER: ICD-10-CM

## 2023-07-06 RX ORDER — ZOLPIDEM TARTRATE 6.25 MG/1
6.25 TABLET, FILM COATED, EXTENDED RELEASE ORAL
Qty: 30 TABLET | Refills: 0 | Status: SHIPPED | OUTPATIENT
Start: 2023-07-06

## 2023-07-06 RX ORDER — ESCITALOPRAM OXALATE 20 MG/1
TABLET ORAL
Qty: 90 TABLET | Refills: 1 | Status: SHIPPED | OUTPATIENT
Start: 2023-07-06

## 2023-07-06 RX ORDER — ESCITALOPRAM OXALATE 20 MG/1
20 TABLET ORAL DAILY
Qty: 90 TABLET | Refills: 0 | Status: SHIPPED | OUTPATIENT
Start: 2023-07-06

## 2023-07-06 RX ORDER — VALACYCLOVIR HYDROCHLORIDE 1 G/1
1000 TABLET, FILM COATED ORAL 3 TIMES DAILY
Qty: 21 TABLET | Refills: 0 | Status: SHIPPED | OUTPATIENT
Start: 2023-07-06 | End: 2023-07-13

## 2023-08-18 DIAGNOSIS — F90.2 ATTENTION DEFICIT HYPERACTIVITY DISORDER (ADHD), COMBINED TYPE: ICD-10-CM

## 2023-09-26 ENCOUNTER — OFFICE VISIT (OUTPATIENT)
Dept: INTERNAL MEDICINE CLINIC | Facility: CLINIC | Age: 58
End: 2023-09-26
Payer: COMMERCIAL

## 2023-09-26 VITALS
DIASTOLIC BLOOD PRESSURE: 72 MMHG | WEIGHT: 146.38 LBS | BODY MASS INDEX: 22.98 KG/M2 | HEART RATE: 90 BPM | SYSTOLIC BLOOD PRESSURE: 142 MMHG | TEMPERATURE: 97.9 F | OXYGEN SATURATION: 97 % | HEIGHT: 67 IN

## 2023-09-26 DIAGNOSIS — J06.9 UPPER RESPIRATORY TRACT INFECTION, UNSPECIFIED TYPE: Primary | ICD-10-CM

## 2023-09-26 DIAGNOSIS — C43.59 MALIGNANT MELANOMA OF SKIN OF TRUNK (HCC): ICD-10-CM

## 2023-09-26 DIAGNOSIS — R06.02 SOB (SHORTNESS OF BREATH): ICD-10-CM

## 2023-09-26 DIAGNOSIS — R06.2 WHEEZING: ICD-10-CM

## 2023-09-26 PROBLEM — L03.114 CELLULITIS OF LEFT HAND: Status: RESOLVED | Noted: 2022-10-19 | Resolved: 2023-09-26

## 2023-09-26 PROCEDURE — 99213 OFFICE O/P EST LOW 20 MIN: CPT | Performed by: PHYSICIAN ASSISTANT

## 2023-09-26 RX ORDER — AZITHROMYCIN 250 MG/1
TABLET, FILM COATED ORAL
Qty: 6 TABLET | Refills: 0 | Status: SHIPPED | OUTPATIENT
Start: 2023-09-26 | End: 2023-10-01

## 2023-09-26 RX ORDER — METHYLPREDNISOLONE 4 MG/1
TABLET ORAL
Qty: 21 EACH | Refills: 0 | Status: SHIPPED | OUTPATIENT
Start: 2023-09-26

## 2023-09-26 RX ORDER — ALBUTEROL SULFATE 90 UG/1
1 AEROSOL, METERED RESPIRATORY (INHALATION) EVERY 6 HOURS PRN
Qty: 18 G | Refills: 1 | Status: SHIPPED | OUTPATIENT
Start: 2023-09-26

## 2023-09-26 NOTE — PROGRESS NOTES
Name: Fani Latham      : 1965      MRN: [id number]  Encounter Provider: Carol Barclay PA-C  Encounter Date: 2023   Encounter department: [id number] [place] [address] [place]     1. Upper respiratory tract infection, unspecified type  Assessment & Plan:  Start zithromax and medrol dose pack. Directions for use and possible side effects discussed and patient verbalized understanding of these. The patient was instructed to change their toothbrush to avoid reinfection. Orders:  -     azithromycin (Zithromax) 250 mg tablet; Take 2 tablets (500 mg total) by mouth daily for 1 day, THEN 1 tablet (250 mg total) daily for 4 days. -     methylPREDNISolone 4 MG tablet therapy pack; Use as directed on package    2. Malignant melanoma of skin of trunk ([address])    3. SOB (shortness of breath)  -     albuterol (PROVENTIL HFA,VENTOLIN HFA) 90 mcg/act inhaler; Inhale 1 puff every 6 (six) hours as needed for wheezing    4. Wheezing  -     albuterol (PROVENTIL HFA,VENTOLIN HFA) 90 mcg/act inhaler; Inhale 1 puff every 6 (six) hours as needed for wheezing        Tobacco Cessation Counseling: Tobacco cessation counseling was not provided. The patient is sincerely urged to quit consumption of tobacco. She is not ready to quit tobacco.         Subjective      Cough  This is a new problem. The current episode started in the past 7 days. The problem has been gradually improving. The problem occurs constantly. The cough is non-productive. Associated symptoms include ear congestion, ear pain, headaches, nasal congestion and rhinorrhea. Pertinent negatives include no chest pain, chills, fever, myalgias, postnasal drip, rash, sore throat, shortness of breath or wheezing. The symptoms are aggravated by exercise. She has tried a beta-agonist inhaler (ciprodex drops) for the symptoms. The treatment provided mild relief. Her past medical history is significant for asthma.      Review of Systems   Constitutional: Negative for chills and fever. HENT: Positive for ear pain and rhinorrhea. Negative for congestion, hearing loss, postnasal drip, sinus pressure, sinus pain, sore throat and trouble swallowing. Eyes: Negative for pain and visual disturbance. Respiratory: Positive for cough. Negative for chest tightness, shortness of breath and wheezing. Cardiovascular: Negative. Negative for chest pain, palpitations and leg swelling. Gastrointestinal: Negative for abdominal pain, blood in stool, constipation, diarrhea, nausea and vomiting. Endocrine: Negative for cold intolerance, heat intolerance, polydipsia, polyphagia and polyuria. Genitourinary: Negative for difficulty urinating, dysuria, flank pain and urgency. Musculoskeletal: Negative for arthralgias, back pain, gait problem and myalgias. Skin: Negative for rash. Allergic/Immunologic: Negative. Neurological: Positive for headaches. Negative for dizziness, weakness and light-headedness. Hematological: Negative. Psychiatric/Behavioral: Negative for behavioral problems, dysphoric mood and sleep disturbance. The patient is not nervous/anxious.         Current Outpatient Medications on File Prior to Visit   Medication Sig   • escitalopram (LEXAPRO) 20 mg tablet Take 1 tablet (20 mg total) by mouth daily   • escitalopram (LEXAPRO) 20 mg tablet TAKE 1 TABLET BY MOUTH EVERY DAY   • lisdexamfetamine (VYVANSE) 30 MG capsule Take 1 capsule (30 mg total) by mouth every morning Max Daily Amount: 30 mg   • lisdexamfetamine (VYVANSE) 50 MG capsule Take 1 capsule (50 mg total) by mouth every morning Max Daily Amount: 50 mg   • PreviDent 5000 Sensitive 1.1-5 % GEL BRUSH 1 TIME AT NIGHT FOR ONE MIN NOTHING MY MOUTH FOR 30 MIN   • zolpidem (AMBIEN CR) 6.25 MG CR tablet Take 1 tablet (6.25 mg total) by mouth daily at bedtime as needed for sleep   • [DISCONTINUED] albuterol (PROVENTIL HFA,VENTOLIN HFA) 90 mcg/act inhaler Inhale 1 puff every 6 (six) hours as needed for wheezing   • B Complex Vitamins (VITAMIN B COMPLEX PO) Take 1 tablet by mouth in the morning   • bimatoprost (LATISSE) 0.03 % ophthalmic solution PLACE 2 DROPS BOTH EYES AT BEDTIME APPLY EVENLY ALONG WITH SKIN ON THE UPPER EYELID AT BASE OF EYELASHES ONCE DAILY AT BEDTIME REPEAT FOR SECOND EYE (Patient not taking: Reported on 10/19/2022)   • ibuprofen (MOTRIN) 600 mg tablet Take 600 mg by mouth every 6 (six) hours as needed   • mupirocin (BACTROBAN) 2 % ointment  (Patient not taking: Reported on 9/26/2023)   • valACYclovir (VALTREX) 1,000 mg tablet Take 1 tablet (1,000 mg total) by mouth 3 (three) times a day for 7 days       Objective     /72 (BP Location: Left arm, Patient Position: Sitting)   Pulse 90   Temp 97.9 °F (36.6 °C)   Ht 5' 6.5" (1.689 m)   Wt 66.4 kg (146 lb 6 oz)   LMP 02/01/2021   SpO2 97%   BMI 23.27 kg/m²     Physical Exam  Vitals and nursing note reviewed. Constitutional:       General: She is not in acute distress. Appearance: She is well-developed. She is not diaphoretic. HENT:      Head: Normocephalic and atraumatic. Right Ear: External ear normal. A middle ear effusion is present. Left Ear: External ear normal. A middle ear effusion is present. Nose: Congestion and rhinorrhea present. Mouth/Throat:      Pharynx: No oropharyngeal exudate. Eyes:      General: No scleral icterus. Right eye: No discharge. Left eye: No discharge. Conjunctiva/sclera: Conjunctivae normal.      Pupils: Pupils are equal, round, and reactive to light. Neck:      Thyroid: No thyromegaly. Cardiovascular:      Rate and Rhythm: Normal rate and regular rhythm. Heart sounds: Normal heart sounds. No murmur heard. No friction rub. No gallop. Pulmonary:      Effort: Pulmonary effort is normal. No respiratory distress. Breath sounds: Normal breath sounds. No wheezing or rales.    Abdominal:      General: Bowel sounds are normal. There is no distension. Palpations: Abdomen is soft. Tenderness: There is no abdominal tenderness. Musculoskeletal:         General: No tenderness or deformity. Normal range of motion. Cervical back: Normal range of motion and neck supple. Skin:     General: Skin is warm and dry. Neurological:      Mental Status: She is alert and oriented to person, place, and time. Cranial Nerves: No cranial nerve deficit. Psychiatric:         Behavior: Behavior normal.         Thought Content:  Thought content normal.         Judgment: Judgment normal.       Janine Nix PA-C

## 2023-09-26 NOTE — ASSESSMENT & PLAN NOTE
Start zithromax and medrol dose pack. Directions for use and possible side effects discussed and patient verbalized understanding of these. The patient was instructed to change their toothbrush to avoid reinfection.

## 2023-09-28 ENCOUNTER — TELEPHONE (OUTPATIENT)
Dept: INTERNAL MEDICINE CLINIC | Facility: CLINIC | Age: 58
End: 2023-09-28

## 2023-09-28 NOTE — TELEPHONE ENCOUNTER
She just needs to give things more time.  This could be viral which would render the antibiotic ineffective but the others will help

## 2023-09-28 NOTE — TELEPHONE ENCOUNTER
Pt called stating she was seen on 9/26/2023 sick visit. Pt still not feeling better after being on an antibiotic for 48 hours: still coughing, left ear completely blocked, coughing up gunk, wheezing. Pt was given Z-Heriberto, steroid, inhaler. Pt needs to go to work tomorrow. Would like to know if there is a different antibiotic that she can take?

## 2023-10-02 ENCOUNTER — TELEPHONE (OUTPATIENT)
Dept: INTERNAL MEDICINE CLINIC | Facility: CLINIC | Age: 58
End: 2023-10-02

## 2023-10-02 NOTE — TELEPHONE ENCOUNTER
Patient called today-she still has some pressure in her ears, they are opening up and she has some hearing. She said her sinuses are still clogged up, she is bringing up green stuff . She is wheezing and using the inhaler. Her chest is not getting better- when she inhales, she feels the rattle. She still has the cough and worse when she lays down.

## 2023-10-04 DIAGNOSIS — J06.9 UPPER RESPIRATORY TRACT INFECTION, UNSPECIFIED TYPE: Primary | ICD-10-CM

## 2023-10-04 RX ORDER — DOXYCYCLINE HYCLATE 100 MG
100 TABLET ORAL 2 TIMES DAILY
Qty: 28 TABLET | Refills: 0 | Status: SHIPPED | OUTPATIENT
Start: 2023-10-04 | End: 2023-10-18

## 2023-10-09 DIAGNOSIS — G47.00 INSOMNIA, UNSPECIFIED TYPE: ICD-10-CM

## 2023-10-09 DIAGNOSIS — B00.1 COLD SORE: ICD-10-CM

## 2023-10-09 RX ORDER — VALACYCLOVIR HYDROCHLORIDE 1 G/1
1000 TABLET, FILM COATED ORAL 3 TIMES DAILY
Qty: 21 TABLET | Refills: 0 | Status: SHIPPED | OUTPATIENT
Start: 2023-10-09 | End: 2023-10-16

## 2023-10-09 RX ORDER — ZOLPIDEM TARTRATE 6.25 MG/1
6.25 TABLET, FILM COATED, EXTENDED RELEASE ORAL
Qty: 30 TABLET | Refills: 0 | Status: SHIPPED | OUTPATIENT
Start: 2023-10-09

## 2023-11-14 DIAGNOSIS — F90.2 ATTENTION DEFICIT HYPERACTIVITY DISORDER (ADHD), COMBINED TYPE: ICD-10-CM

## 2023-11-14 DIAGNOSIS — R06.2 WHEEZING: ICD-10-CM

## 2023-11-14 DIAGNOSIS — G47.00 INSOMNIA, UNSPECIFIED TYPE: ICD-10-CM

## 2023-11-14 DIAGNOSIS — B00.1 COLD SORE: ICD-10-CM

## 2023-11-14 DIAGNOSIS — R06.02 SOB (SHORTNESS OF BREATH): ICD-10-CM

## 2023-11-14 RX ORDER — ALBUTEROL SULFATE 90 UG/1
AEROSOL, METERED RESPIRATORY (INHALATION)
Qty: 8.5 G | Refills: 0 | Status: SHIPPED | OUTPATIENT
Start: 2023-11-14

## 2023-11-14 RX ORDER — LISDEXAMFETAMINE DIMESYLATE CAPSULES 30 MG/1
30 CAPSULE ORAL EVERY MORNING
Qty: 90 CAPSULE | Refills: 0 | Status: SHIPPED | OUTPATIENT
Start: 2023-11-14

## 2023-11-14 RX ORDER — LISDEXAMFETAMINE DIMESYLATE CAPSULES 50 MG/1
50 CAPSULE ORAL EVERY MORNING
Qty: 90 CAPSULE | Refills: 0 | Status: SHIPPED | OUTPATIENT
Start: 2023-11-14

## 2023-11-14 RX ORDER — ZOLPIDEM TARTRATE 6.25 MG/1
6.25 TABLET, FILM COATED, EXTENDED RELEASE ORAL
Qty: 30 TABLET | Refills: 0 | Status: SHIPPED | OUTPATIENT
Start: 2023-11-14

## 2023-11-14 RX ORDER — VALACYCLOVIR HYDROCHLORIDE 1 G/1
1000 TABLET, FILM COATED ORAL 3 TIMES DAILY
Qty: 21 TABLET | Refills: 0 | Status: SHIPPED | OUTPATIENT
Start: 2023-11-14 | End: 2023-11-21

## 2024-02-28 DIAGNOSIS — F90.2 ATTENTION DEFICIT HYPERACTIVITY DISORDER (ADHD), COMBINED TYPE: ICD-10-CM

## 2024-02-28 RX ORDER — LISDEXAMFETAMINE DIMESYLATE CAPSULES 30 MG/1
30 CAPSULE ORAL EVERY MORNING
Qty: 90 CAPSULE | Refills: 0 | Status: SHIPPED | OUTPATIENT
Start: 2024-02-28

## 2024-02-28 RX ORDER — LISDEXAMFETAMINE DIMESYLATE CAPSULES 50 MG/1
50 CAPSULE ORAL EVERY MORNING
Qty: 90 CAPSULE | Refills: 0 | Status: SHIPPED | OUTPATIENT
Start: 2024-02-28

## 2024-03-10 DIAGNOSIS — F41.9 ANXIETY: ICD-10-CM

## 2024-03-10 DIAGNOSIS — F32.A DEPRESSIVE DISORDER: ICD-10-CM

## 2024-03-11 RX ORDER — ESCITALOPRAM OXALATE 20 MG/1
20 TABLET ORAL DAILY
Qty: 90 TABLET | Refills: 1 | Status: SHIPPED | OUTPATIENT
Start: 2024-03-11 | End: 2024-03-14

## 2024-03-14 ENCOUNTER — OFFICE VISIT (OUTPATIENT)
Dept: INTERNAL MEDICINE CLINIC | Facility: CLINIC | Age: 59
End: 2024-03-14
Payer: COMMERCIAL

## 2024-03-14 VITALS
TEMPERATURE: 98.2 F | OXYGEN SATURATION: 96 % | BODY MASS INDEX: 22.73 KG/M2 | HEART RATE: 84 BPM | SYSTOLIC BLOOD PRESSURE: 136 MMHG | WEIGHT: 144.8 LBS | DIASTOLIC BLOOD PRESSURE: 84 MMHG | HEIGHT: 67 IN

## 2024-03-14 DIAGNOSIS — E55.9 VITAMIN D DEFICIENCY: ICD-10-CM

## 2024-03-14 DIAGNOSIS — Z12.4 SCREENING FOR CERVICAL CANCER: ICD-10-CM

## 2024-03-14 DIAGNOSIS — Z23 ENCOUNTER FOR IMMUNIZATION: ICD-10-CM

## 2024-03-14 DIAGNOSIS — Z11.4 SCREENING FOR HIV (HUMAN IMMUNODEFICIENCY VIRUS): ICD-10-CM

## 2024-03-14 DIAGNOSIS — F17.210 SMOKING GREATER THAN 20 PACK YEARS: ICD-10-CM

## 2024-03-14 DIAGNOSIS — Z13.220 SCREENING, LIPID: ICD-10-CM

## 2024-03-14 DIAGNOSIS — Z11.59 NEED FOR HEPATITIS C SCREENING TEST: ICD-10-CM

## 2024-03-14 DIAGNOSIS — Z13.0 SCREENING FOR DEFICIENCY ANEMIA: ICD-10-CM

## 2024-03-14 DIAGNOSIS — M19.041 OSTEOARTHRITIS OF FINGER, RIGHT: ICD-10-CM

## 2024-03-14 DIAGNOSIS — Z00.00 WELL ADULT EXAM: ICD-10-CM

## 2024-03-14 DIAGNOSIS — Z13.29 SCREENING FOR THYROID DISORDER: ICD-10-CM

## 2024-03-14 DIAGNOSIS — Z12.11 SCREENING FOR COLON CANCER: Primary | ICD-10-CM

## 2024-03-14 DIAGNOSIS — Z12.31 ENCOUNTER FOR SCREENING MAMMOGRAM FOR BREAST CANCER: ICD-10-CM

## 2024-03-14 DIAGNOSIS — Z13.1 SCREENING FOR DIABETES MELLITUS: ICD-10-CM

## 2024-03-14 PROCEDURE — 99396 PREV VISIT EST AGE 40-64: CPT | Performed by: PHYSICIAN ASSISTANT

## 2024-03-14 RX ORDER — DICLOFENAC SODIUM 75 MG/1
75 TABLET, DELAYED RELEASE ORAL 2 TIMES DAILY
Qty: 180 TABLET | Refills: 1 | Status: SHIPPED | OUTPATIENT
Start: 2024-03-14

## 2024-03-15 PROBLEM — M19.041 OSTEOARTHRITIS OF FINGER, RIGHT: Status: ACTIVE | Noted: 2024-03-15

## 2024-03-15 NOTE — ASSESSMENT & PLAN NOTE
Start diclofenac. Directions for use and possible side effects discussed and patient verbalized understanding of these.

## 2024-03-15 NOTE — PROGRESS NOTES
ADULT ANNUAL PHYSICAL  [facility]    NAME: Mary Henry  AGE: 58 y.o. SEX: female  : 1965     DATE: 3/15/2024     Assessment and Plan:     Problem List Items Addressed This Visit       Osteoarthritis of finger, right     Start diclofenac. Directions for use and possible side effects discussed and patient verbalized understanding of these.           Relevant Medications    diclofenac (VOLTAREN) 75 mg EC tablet     Other Visit Diagnoses       Screening for colon cancer    -  Primary    Need for hepatitis C screening test        Relevant Orders    Hepatitis C Antibody    Screening for HIV (human immunodeficiency virus)        Relevant Orders    HIV 1/2 AG/AB w Reflex [facility] for 2 yr old and above    Smoking greater than 20 pack years        Relevant Orders    CT lung screening program    Encounter for screening mammogram for breast cancer        Relevant Orders    Mammo screening bilateral w 3d & cad    Screening for cervical cancer        Encounter for immunization        Relevant Orders    Zoster Vaccine Recombinant IM    Screening for deficiency anemia        Relevant Orders    CBC and differential    Screening for diabetes mellitus        Relevant Orders    Comprehensive metabolic panel    Screening for thyroid disorder        Relevant Orders    TSH, 3rd generation with Free T4 reflex    Vitamin D deficiency        Relevant Orders    Vitamin D 25 hydroxy    Screening, lipid        Relevant Orders    Lipid panel    Well adult exam                Immunizations and preventive care screenings were discussed with patient today. Appropriate education was printed on patient's after visit summary.    Counseling:  Labs and mammogram ordered as well as CT lung screening.       Depression Screening and Follow-up Plan: Patient's depression screening was positive with a PHQ-9 score of 6. Continue regular follow-up with their mental health provider who is managing  their mental health condition(s).     Tobacco Cessation Counseling: Tobacco cessation counseling was not provided. The patient is sincerely urged to quit consumption of tobacco. She is not ready to quit tobacco.         Return in about 6 months (around 2024), or 6mo routine and also sched pap as well.     Chief Complaint:     Chief Complaint   Patient presents with    Physical Exam     Annual physical. Swollen knuckle on right hand. Thinks it is arthritis. Would like something to manage the pain.      History of Present Illness:     Adult Annual Physical   Patient here for a comprehensive physical exam. The patient reports  arthritis of knuckles with deformity .    Diet and Physical Activity  Diet/Nutrition: well balanced diet.   Exercise: moderate cardiovascular exercise.      Depression Screening  PHQ-2/9 Depression Screening    Little interest or pleasure in doing things: 0 - not at all  Feeling down, depressed, or hopeless: 0 - not at all  Trouble falling or staying asleep, or sleeping too much: 1 - several days  Feeling tired or having little energy: 3 - nearly every day  Poor appetite or overeatin - several days  Feeling bad about yourself - or that you are a failure or have let yourself or your family down: 1 - several days  Trouble concentrating on things, such as reading the newspaper or watching television: 0 - not at all  Moving or speaking so slowly that other people could have noticed. Or the opposite - being so fidgety or restless that you have been moving around a lot more than usual: 0 - not at all  Thoughts that you would be better off dead, or of hurting yourself in some way: 0 - not at all  PHQ-9 Score: 6  PHQ-9 Interpretation: Mild depression       General Health  Sleep: sleeps well.   Hearing: normal - bilateral.  Vision: no vision problems.   Dental: regular dental visits.       /GYN Health  Follows with gynecology? yes       Advanced Care Planning  Do you have an advanced directive?  no  Do you have a durable medical power of ? no  ACP document given to the patient? no     Review of Systems:     Review of Systems   Constitutional:  Negative for chills and fever.   HENT:  Negative for congestion, ear pain, hearing loss, postnasal drip, rhinorrhea, sinus pressure, sinus pain, sore throat and trouble swallowing.    Eyes:  Negative for pain and visual disturbance.   Respiratory:  Negative for cough, chest tightness, shortness of breath and wheezing.    Cardiovascular: Negative.  Negative for chest pain, palpitations and leg swelling.   Gastrointestinal:  Negative for abdominal pain, blood in stool, constipation, diarrhea, nausea and vomiting.   Endocrine: Negative for cold intolerance, heat intolerance, polydipsia, polyphagia and polyuria.   Genitourinary:  Negative for difficulty urinating, dysuria, flank pain and urgency.   Musculoskeletal:  Positive for arthralgias (right index PIP). Negative for back pain, gait problem and myalgias.   Skin:  Negative for rash.   Allergic/Immunologic: Negative.    Neurological:  Negative for dizziness, weakness, light-headedness and headaches.   Hematological: Negative.    Psychiatric/Behavioral:  Negative for behavioral problems, dysphoric mood and sleep disturbance. The patient is not nervous/anxious.       Past Medical History:     Past Medical History:   Diagnosis Date    ADD (attention deficit disorder) 2019    Anxiety 05/31/2013    Arthritis 2022    Gradually worse    Herpes simplex     EXTERNAL    IBS (irritable bowel syndrome)     Insomnia     Jaundice     Malignant melanoma in situ (HCC)     Pneumonia     PONV (postoperative nausea and vomiting)     Reactive airway disease       Past Surgical History:     Past Surgical History:   Procedure Laterality Date    AUGMENTATION BREAST      AUGMENTATION MAMMAPLASTY Bilateral 28years ago    KNEE SURGERY      OR ARTHRS KNE SURG W/MENISCECTOMY MED/LAT W/SHVG Right 04/06/2021    Procedure: KNEE ARTHROSCOPY,  medial lateral menisectomy,chondroplasty,synovectomy,inj;  Surgeon: Horace Luna DO;  Location:  MAIN OR;  Service: Orthopedics    SKIN BIOPSY  2013    2 PUNCH BIOPSIES    SKIN LESION EXCISION Right 2013    LEG    TONSILLECTOMY      TUBAL LIGATION      WOUND DEBRIDEMENT Left 10/19/2022    Procedure: DEBRIDEMENT WOUND AND DRESSING CHANGE (WASH OUT);  Surgeon: Anthony Daley MD;  Location: [facility];  Service: Orthopedics      Social History:     Social History     Socioeconomic History    Marital status: /Civil Union     Spouse name: None    Number of children: None    Years of education: None    Highest education level: None   Occupational History    Occupation: EMPLOYED    Occupation: [employer]   Tobacco Use    Smoking status: Every Day     Current packs/day: 1.00     Average packs/day: 1 pack/day for 45.0 years (45.0 ttl pk-yrs)     Types: Cigarettes     Passive exposure: Current    Smokeless tobacco: Never   Vaping Use    Vaping status: Never Used   Substance and Sexual Activity    Alcohol use: Yes     Comment: Socially Mostly summer parties.    Drug use: No    Sexual activity: Yes     Partners: Male     Birth control/protection: Female Sterilization   Other Topics Concern    None   Social History Narrative    EMPLOYED     Social Determinants of Health     Financial Resource Strain: Not on file   Food Insecurity: No Food Insecurity (10/20/2022)    Hunger Vital Sign     Worried About Running Out of Food in the Last Year: Never true     Ran Out of Food in the Last Year: Never true   Transportation Needs: No Transportation Needs (10/20/2022)    PRAPARE - Transportation     Lack of Transportation (Medical): No     Lack of Transportation (Non-Medical): No   Physical Activity: Not on file   Stress: Not on file   Social Connections: Not on file   Intimate Partner Violence: Not on file   Housing Stability: Low Risk  (10/20/2022)    Housing Stability Vital Sign     Unable to Pay for Housing in the Last  "Year: No     Number of Places Lived in the Last Year: 1     Unstable Housing in the Last Year: No      Family History:     Family History   Problem Relation Age of Onset    Heart disease Father     Diabetes Other         MELLITUS    Hyperlipidemia Other     ADD / ADHD Mother         She has trouble concentrating and is also depressed.    No Known Problems Maternal Grandmother     No Known Problems Paternal Grandmother     No Known Problems Maternal Aunt     No Known Problems Maternal Aunt     No Known Problems Maternal Aunt       Current Medications:     Current Outpatient Medications   Medication Sig Dispense Refill    albuterol (PROVENTIL HFA,VENTOLIN HFA) 90 mcg/act inhaler inhale 1 puff by mouth every 6 hours if needed for wheezing 8.5 g 0    diclofenac (VOLTAREN) 75 mg EC tablet Take 1 tablet (75 mg total) by mouth 2 (two) times a day 180 tablet 1    escitalopram (LEXAPRO) 20 mg tablet TAKE 1 TABLET BY MOUTH EVERY DAY 90 tablet 1    lisdexamfetamine (VYVANSE) 30 MG capsule Take 1 capsule (30 mg total) by mouth every morning Max Daily Amount: 30 mg 90 capsule 0    lisdexamfetamine (VYVANSE) 50 MG capsule Take 1 capsule (50 mg total) by mouth every morning Max Daily Amount: 50 mg 90 capsule 0    PreviDent 5000 Sensitive 1.1-5 % GEL BRUSH 1 TIME AT NIGHT FOR ONE MIN NOTHING MY MOUTH FOR 30 MIN      valACYclovir (VALTREX) 1,000 mg tablet Take 1 tablet (1,000 mg total) by mouth 3 (three) times a day for 7 days 21 tablet 0    zolpidem (AMBIEN CR) 6.25 MG CR tablet Take 1 tablet (6.25 mg total) by mouth daily at bedtime as needed for sleep 30 tablet 0     No current facility-administered medications for this visit.      Allergies:     Allergies   Allergen Reactions    Bacitracin Rash    Neomycin Rash    Polymyxin B Rash    Vilazodone Other (See Comments)     Other reaction(s): abdomen pain      Physical Exam:     /84   Pulse 84   Temp 98.2 °F (36.8 °C) (Tympanic)   Ht 5' 6.5\" (1.689 m)   Wt 65.7 kg (144 lb " 12.8 oz)   LMP 02/01/2021   SpO2 96%   BMI 23.02 kg/m²     Physical Exam  Vitals and nursing note reviewed.   Constitutional:       Appearance: She is well-developed.   HENT:      Head: Normocephalic and atraumatic.      Right Ear: External ear normal.      Left Ear: External ear normal.      Nose: Nose normal.   Eyes:      Conjunctiva/sclera: Conjunctivae normal.   Cardiovascular:      Rate and Rhythm: Normal rate and regular rhythm.      Heart sounds: Normal heart sounds.   Pulmonary:      Effort: Pulmonary effort is normal.      Breath sounds: Normal breath sounds.   Abdominal:      General: Bowel sounds are normal.      Palpations: Abdomen is soft.   Musculoskeletal:         General: Normal range of motion.      Right hand: Deformity (right index PIP enlarged) present.      Cervical back: Normal range of motion and neck supple.   Skin:     General: Skin is warm and dry.   Neurological:      Mental Status: She is alert and oriented to person, place, and time.   Psychiatric:         Behavior: Behavior normal.         Thought Content: Thought content normal.         Judgment: Judgment normal.          Janine Nix PA-C  [facility]

## 2024-03-21 ENCOUNTER — TELEPHONE (OUTPATIENT)
Dept: INTERNAL MEDICINE CLINIC | Facility: CLINIC | Age: 59
End: 2024-03-21

## 2024-03-21 NOTE — TELEPHONE ENCOUNTER
Received [employer] forms from maura, messaged patient on [employer], faxed forms and gave to shakira to scan and put into draw

## 2024-03-22 ENCOUNTER — HOSPITAL ENCOUNTER (OUTPATIENT)
Dept: CT IMAGING | Facility: HOSPITAL | Age: 59
End: 2024-03-22
Payer: COMMERCIAL

## 2024-03-22 ENCOUNTER — HOSPITAL ENCOUNTER (OUTPATIENT)
Dept: MAMMOGRAPHY | Facility: HOSPITAL | Age: 59
End: 2024-03-22
Payer: COMMERCIAL

## 2024-03-22 DIAGNOSIS — Z12.31 ENCOUNTER FOR SCREENING MAMMOGRAM FOR BREAST CANCER: ICD-10-CM

## 2024-03-22 DIAGNOSIS — F17.210 SMOKING GREATER THAN 20 PACK YEARS: ICD-10-CM

## 2024-03-22 PROCEDURE — 71271 CT THORAX LUNG CANCER SCR C-: CPT

## 2024-03-22 PROCEDURE — 77063 BREAST TOMOSYNTHESIS BI: CPT

## 2024-03-22 PROCEDURE — 77067 SCR MAMMO BI INCL CAD: CPT

## 2024-03-23 ENCOUNTER — LAB (OUTPATIENT)
Dept: LAB | Facility: CLINIC | Age: 59
End: 2024-03-23
Payer: COMMERCIAL

## 2024-03-23 DIAGNOSIS — Z13.220 SCREENING, LIPID: ICD-10-CM

## 2024-03-23 DIAGNOSIS — Z11.59 NEED FOR HEPATITIS C SCREENING TEST: ICD-10-CM

## 2024-03-23 DIAGNOSIS — E55.9 VITAMIN D DEFICIENCY: ICD-10-CM

## 2024-03-23 DIAGNOSIS — Z11.4 SCREENING FOR HIV (HUMAN IMMUNODEFICIENCY VIRUS): ICD-10-CM

## 2024-03-23 DIAGNOSIS — Z13.0 SCREENING FOR DEFICIENCY ANEMIA: ICD-10-CM

## 2024-03-23 DIAGNOSIS — Z13.1 SCREENING FOR DIABETES MELLITUS: ICD-10-CM

## 2024-03-23 DIAGNOSIS — Z13.29 SCREENING FOR THYROID DISORDER: ICD-10-CM

## 2024-03-23 LAB
25(OH)D3 SERPL-MCNC: 111.7 NG/ML (ref 30–100)
ALBUMIN SERPL BCP-MCNC: 4.1 G/DL (ref 3.5–5)
ALP SERPL-CCNC: 61 U/L (ref 34–104)
ALT SERPL W P-5'-P-CCNC: 15 U/L (ref 7–52)
ANION GAP SERPL CALCULATED.3IONS-SCNC: 7 MMOL/L (ref 4–13)
AST SERPL W P-5'-P-CCNC: 17 U/L (ref 13–39)
BASOPHILS # BLD AUTO: 0.08 THOUSANDS/ÂΜL (ref 0–0.1)
BASOPHILS NFR BLD AUTO: 1 % (ref 0–1)
BILIRUB SERPL-MCNC: 0.33 MG/DL (ref 0.2–1)
BUN SERPL-MCNC: 12 MG/DL (ref 5–25)
CALCIUM SERPL-MCNC: 9 MG/DL (ref 8.4–10.2)
CHLORIDE SERPL-SCNC: 106 MMOL/L (ref 96–108)
CHOLEST SERPL-MCNC: 202 MG/DL
CO2 SERPL-SCNC: 30 MMOL/L (ref 21–32)
CREAT SERPL-MCNC: 0.63 MG/DL (ref 0.6–1.3)
EOSINOPHIL # BLD AUTO: 0.17 THOUSAND/ÂΜL (ref 0–0.61)
EOSINOPHIL NFR BLD AUTO: 3 % (ref 0–6)
ERYTHROCYTE [DISTWIDTH] IN BLOOD BY AUTOMATED COUNT: 13.1 % (ref 11.6–15.1)
GFR SERPL CREATININE-BSD FRML MDRD: 100 ML/MIN/1.73SQ M
GLUCOSE P FAST SERPL-MCNC: 85 MG/DL (ref 65–99)
HCT VFR BLD AUTO: 42.4 % (ref 34.8–46.1)
HCV AB SER QL: NORMAL
HDLC SERPL-MCNC: 73 MG/DL
HGB BLD-MCNC: 13.7 G/DL (ref 11.5–15.4)
IMM GRANULOCYTES # BLD AUTO: 0.02 THOUSAND/UL (ref 0–0.2)
IMM GRANULOCYTES NFR BLD AUTO: 0 % (ref 0–2)
LDLC SERPL CALC-MCNC: 114 MG/DL (ref 0–100)
LYMPHOCYTES # BLD AUTO: 2.26 THOUSANDS/ÂΜL (ref 0.6–4.47)
LYMPHOCYTES NFR BLD AUTO: 38 % (ref 14–44)
MCH RBC QN AUTO: 29.1 PG (ref 26.8–34.3)
MCHC RBC AUTO-ENTMCNC: 32.3 G/DL (ref 31.4–37.4)
MCV RBC AUTO: 90 FL (ref 82–98)
MONOCYTES # BLD AUTO: 0.44 THOUSAND/ÂΜL (ref 0.17–1.22)
MONOCYTES NFR BLD AUTO: 7 % (ref 4–12)
NEUTROPHILS # BLD AUTO: 2.95 THOUSANDS/ÂΜL (ref 1.85–7.62)
NEUTS SEG NFR BLD AUTO: 51 % (ref 43–75)
NONHDLC SERPL-MCNC: 129 MG/DL
NRBC BLD AUTO-RTO: 0 /100 WBCS
PLATELET # BLD AUTO: 306 THOUSANDS/UL (ref 149–390)
PMV BLD AUTO: 10.9 FL (ref 8.9–12.7)
POTASSIUM SERPL-SCNC: 4.8 MMOL/L (ref 3.5–5.3)
PROT SERPL-MCNC: 6.3 G/DL (ref 6.4–8.4)
RBC # BLD AUTO: 4.71 MILLION/UL (ref 3.81–5.12)
SODIUM SERPL-SCNC: 143 MMOL/L (ref 135–147)
T4 FREE SERPL-MCNC: 0.66 NG/DL (ref 0.61–1.12)
TRIGL SERPL-MCNC: 77 MG/DL
TSH SERPL DL<=0.05 MIU/L-ACNC: 4.92 UIU/ML (ref 0.45–4.5)
WBC # BLD AUTO: 5.92 THOUSAND/UL (ref 4.31–10.16)

## 2024-03-23 PROCEDURE — 82306 VITAMIN D 25 HYDROXY: CPT

## 2024-03-23 PROCEDURE — 84439 ASSAY OF FREE THYROXINE: CPT

## 2024-03-23 PROCEDURE — 84443 ASSAY THYROID STIM HORMONE: CPT

## 2024-03-23 PROCEDURE — 85025 COMPLETE CBC W/AUTO DIFF WBC: CPT

## 2024-03-23 PROCEDURE — 87389 HIV-1 AG W/HIV-1&-2 AB AG IA: CPT

## 2024-03-23 PROCEDURE — 80053 COMPREHEN METABOLIC PANEL: CPT

## 2024-03-23 PROCEDURE — 36415 COLL VENOUS BLD VENIPUNCTURE: CPT

## 2024-03-23 PROCEDURE — 86803 HEPATITIS C AB TEST: CPT

## 2024-03-23 PROCEDURE — 80061 LIPID PANEL: CPT

## 2024-03-24 LAB
HIV 1+2 AB+HIV1 P24 AG SERPL QL IA: NORMAL
HIV 2 AB SERPL QL IA: NORMAL
HIV1 AB SERPL QL IA: NORMAL
HIV1 P24 AG SERPL QL IA: NORMAL

## 2024-03-25 DIAGNOSIS — E03.9 ACQUIRED HYPOTHYROIDISM: Primary | ICD-10-CM

## 2024-03-25 DIAGNOSIS — E03.9 ACQUIRED HYPOTHYROIDISM: ICD-10-CM

## 2024-03-25 RX ORDER — LEVOTHYROXINE SODIUM 0.03 MG/1
25 TABLET ORAL
Qty: 90 TABLET | Refills: 3 | Status: SHIPPED | OUTPATIENT
Start: 2024-03-25 | End: 2024-03-25 | Stop reason: SDUPTHER

## 2024-03-25 RX ORDER — LEVOTHYROXINE SODIUM 0.03 MG/1
25 TABLET ORAL
Qty: 90 TABLET | Refills: 3 | Status: SHIPPED | OUTPATIENT
Start: 2024-03-25

## 2024-03-25 NOTE — RESULT ENCOUNTER NOTE
Lobo Hernandez  I reviewed your labs, Your thyroid was very slightly underactive. We can either start low dose medicine for this or just recheck in 1 month. Your vitamin d was a little high so I would cut back on any supplement you take. Let me know how you would like to proceed regarding the thyroid.   -Janine

## 2024-03-25 NOTE — RESULT ENCOUNTER NOTE
Lobo Hernandez  I reviewed your mammogram and it was was normal. We recommend repeating in one year for maintenance. Have a great day.   Sol

## 2024-03-25 NOTE — RESULT ENCOUNTER NOTE
Lobo Hernandez  Your lung screening was normal. We recommend repeat in a year for annual maintenance. Have a great day  -Janine

## 2024-04-03 ENCOUNTER — TELEPHONE (OUTPATIENT)
Dept: INTERNAL MEDICINE CLINIC | Facility: CLINIC | Age: 59
End: 2024-04-03

## 2024-04-04 ENCOUNTER — TELEPHONE (OUTPATIENT)
Dept: INTERNAL MEDICINE CLINIC | Facility: CLINIC | Age: 59
End: 2024-04-04

## 2024-04-04 NOTE — TELEPHONE ENCOUNTER
Phone call placed to [employer] regarding inability to send fax through[employer]raiah [employer]paricio, the representative did tell me to email the claim to [email or web address].  Will try this.

## 2024-04-04 NOTE — TELEPHONE ENCOUNTER
Phone call to patient regarding FMLA forms.  Fax will not go through.  Asking patient is she would like to  papers

## 2024-04-16 ENCOUNTER — LAB (OUTPATIENT)
Dept: LAB | Facility: CLINIC | Age: 59
End: 2024-04-16
Payer: COMMERCIAL

## 2024-04-16 DIAGNOSIS — E03.9 ACQUIRED HYPOTHYROIDISM: ICD-10-CM

## 2024-04-16 LAB — TSH SERPL DL<=0.05 MIU/L-ACNC: 2.63 UIU/ML (ref 0.45–4.5)

## 2024-04-16 PROCEDURE — 84443 ASSAY THYROID STIM HORMONE: CPT

## 2024-04-16 PROCEDURE — 36415 COLL VENOUS BLD VENIPUNCTURE: CPT

## 2024-04-17 NOTE — RESULT ENCOUNTER NOTE
Lobo Hernandez  Your repeat thyroid blood work was normal. Continue the same dose of levothyroxine. Have a great day  -Janine

## 2024-05-30 DIAGNOSIS — F90.2 ATTENTION DEFICIT HYPERACTIVITY DISORDER (ADHD), COMBINED TYPE: ICD-10-CM

## 2024-05-30 DIAGNOSIS — M19.041 OSTEOARTHRITIS OF FINGER, RIGHT: ICD-10-CM

## 2024-05-31 RX ORDER — DICLOFENAC SODIUM 75 MG/1
75 TABLET, DELAYED RELEASE ORAL 2 TIMES DAILY
Qty: 180 TABLET | Refills: 0 | Status: SHIPPED | OUTPATIENT
Start: 2024-05-31

## 2024-05-31 RX ORDER — LISDEXAMFETAMINE DIMESYLATE 50 MG/1
50 CAPSULE ORAL EVERY MORNING
Qty: 90 CAPSULE | Refills: 0 | Status: SHIPPED | OUTPATIENT
Start: 2024-05-31

## 2024-05-31 RX ORDER — LISDEXAMFETAMINE DIMESYLATE 30 MG/1
30 CAPSULE ORAL EVERY MORNING
Qty: 90 CAPSULE | Refills: 0 | Status: SHIPPED | OUTPATIENT
Start: 2024-05-31

## 2024-06-28 ENCOUNTER — OFFICE VISIT (OUTPATIENT)
Dept: INTERNAL MEDICINE CLINIC | Facility: CLINIC | Age: 59
End: 2024-06-28
Payer: COMMERCIAL

## 2024-06-28 VITALS
SYSTOLIC BLOOD PRESSURE: 124 MMHG | HEIGHT: 67 IN | OXYGEN SATURATION: 98 % | DIASTOLIC BLOOD PRESSURE: 70 MMHG | HEART RATE: 78 BPM | BODY MASS INDEX: 23.4 KG/M2 | WEIGHT: 149.13 LBS | TEMPERATURE: 97.1 F

## 2024-06-28 DIAGNOSIS — Z12.4 SCREENING FOR CERVICAL CANCER: ICD-10-CM

## 2024-06-28 DIAGNOSIS — Z23 ENCOUNTER FOR IMMUNIZATION: ICD-10-CM

## 2024-06-28 DIAGNOSIS — M19.041 PRIMARY OSTEOARTHRITIS OF RIGHT HAND: Primary | ICD-10-CM

## 2024-06-28 PROCEDURE — 99213 OFFICE O/P EST LOW 20 MIN: CPT | Performed by: PHYSICIAN ASSISTANT

## 2024-06-28 RX ORDER — CELECOXIB 200 MG/1
200 CAPSULE ORAL 2 TIMES DAILY
Qty: 60 CAPSULE | Refills: 3 | Status: SHIPPED | OUTPATIENT
Start: 2024-06-28

## 2024-06-30 PROBLEM — J06.9 UPPER RESPIRATORY TRACT INFECTION: Status: RESOLVED | Noted: 2019-10-17 | Resolved: 2024-06-30

## 2024-07-01 NOTE — ASSESSMENT & PLAN NOTE
Discontinue diclofenac in favor of celebrex. Hand specialist referral provided. Will give accomodation forms once received.

## 2024-07-01 NOTE — PROGRESS NOTES
Ambulatory Visit  Name: Mary Henry      : 1965      MRN: [id number]  Encounter Provider: Janine Nix PA-C  Encounter Date: 2024   Encounter department: [facility]    Assessment & Plan   1. Primary osteoarthritis of right hand  Assessment & Plan:  Discontinue diclofenac in favor of celebrex. Hand specialist referral provided. Will give accomodation forms once received.   Orders:  -     celecoxib (CeleBREX) 200 mg capsule; Take 1 capsule (200 mg total) by mouth 2 (two) times a day  -     Ambulatory Referral to Orthopedic Surgery; Future  2. Screening for cervical cancer  3. Encounter for immunization  -     Pneumococcal Conjugate Vaccine 20-valent (Pcv20)  -     Zoster Vaccine Recombinant IM       History of Present Illness     Pt presents for evaluation of her worsening joint pain in her hands, right worse than left. She has enlarged knuckles and swelling of the hand. She has tried diclofenac which had helped but not enough. She is having a hard time using the new safety box cutters at work as her dexterity is compromised and would like accommodations to use the old cutter.     Hand Pain   Pertinent negatives include no chest pain.       Review of Systems   Constitutional:  Negative for chills and fever.   HENT:  Negative for congestion, ear pain, hearing loss, postnasal drip, rhinorrhea, sinus pressure, sinus pain, sore throat and trouble swallowing.    Eyes:  Negative for pain and visual disturbance.   Respiratory:  Negative for cough, chest tightness, shortness of breath and wheezing.    Cardiovascular: Negative.  Negative for chest pain, palpitations and leg swelling.   Gastrointestinal:  Negative for abdominal pain, blood in stool, constipation, diarrhea, nausea and vomiting.   Endocrine: Negative for cold intolerance, heat intolerance, polydipsia, polyphagia and polyuria.   Genitourinary:  Negative for difficulty urinating, dysuria, flank pain and urgency.  "  Musculoskeletal:  Positive for arthralgias. Negative for back pain, gait problem and myalgias.   Skin:  Negative for rash.   Allergic/Immunologic: Negative.    Neurological:  Negative for dizziness, weakness, light-headedness and headaches.   Hematological: Negative.    Psychiatric/Behavioral:  Negative for behavioral problems, dysphoric mood and sleep disturbance. The patient is not nervous/anxious.        Objective     /70   Pulse 78   Temp (!) 97.1 °F (36.2 °C)   Ht 5' 6.5\" (1.689 m)   Wt 67.6 kg (149 lb 2 oz)   LMP 02/01/2021   SpO2 98%   BMI 23.71 kg/m²     Physical Exam  Vitals and nursing note reviewed.   Constitutional:       General: She is not in acute distress.     Appearance: Normal appearance. She is well-developed. She is not diaphoretic.   HENT:      Head: Normocephalic and atraumatic.      Right Ear: External ear normal.      Left Ear: External ear normal.      Nose: Nose normal.      Mouth/Throat:      Pharynx: No oropharyngeal exudate.   Eyes:      General: No scleral icterus.        Right eye: No discharge.         Left eye: No discharge.      Conjunctiva/sclera: Conjunctivae normal.      Pupils: Pupils are equal, round, and reactive to light.   Neck:      Thyroid: No thyromegaly.   Cardiovascular:      Rate and Rhythm: Normal rate and regular rhythm.      Heart sounds: Normal heart sounds. No murmur heard.     No friction rub. No gallop.   Pulmonary:      Effort: Pulmonary effort is normal. No respiratory distress.      Breath sounds: Normal breath sounds. No wheezing or rales.   Abdominal:      General: Bowel sounds are normal. There is no distension.      Palpations: Abdomen is soft.      Tenderness: There is no abdominal tenderness.   Musculoskeletal:         General: No tenderness.      Right hand: Swelling and deformity present. Decreased range of motion.      Cervical back: Normal range of motion and neck supple.   Skin:     General: Skin is warm and dry.   Neurological:      " Mental Status: She is alert and oriented to person, place, and time.      Cranial Nerves: No cranial nerve deficit.   Psychiatric:         Behavior: Behavior normal.         Thought Content: Thought content normal.         Judgment: Judgment normal.       Administrative Statements

## 2024-07-11 NOTE — TELEPHONE ENCOUNTER
Ordered and routed 
Pt asking for fills on Vyvanse 30mg and 50mg, please send to [facility] in [address] 
They were sent to the wrong pharmacy  They need to go to [facility], per my last message 
note, I have reviewed this plan of care and certify a need for medically necessary rehabilitation services.     *PLEASE SIGN ABOVE AND FAX BACK ALL PAGES*

## 2024-07-15 ENCOUNTER — OFFICE VISIT (OUTPATIENT)
Dept: OBGYN CLINIC | Facility: CLINIC | Age: 59
End: 2024-07-15
Payer: COMMERCIAL

## 2024-07-15 VITALS
DIASTOLIC BLOOD PRESSURE: 80 MMHG | HEIGHT: 67 IN | BODY MASS INDEX: 23.39 KG/M2 | SYSTOLIC BLOOD PRESSURE: 128 MMHG | WEIGHT: 149 LBS

## 2024-07-15 DIAGNOSIS — M19.041 PRIMARY OSTEOARTHRITIS OF RIGHT HAND: ICD-10-CM

## 2024-07-15 DIAGNOSIS — M65.321 TRIGGER FINGER, RIGHT INDEX FINGER: Primary | ICD-10-CM

## 2024-07-15 PROCEDURE — 99213 OFFICE O/P EST LOW 20 MIN: CPT | Performed by: SURGERY

## 2024-07-15 PROCEDURE — 20550 NJX 1 TENDON SHEATH/LIGAMENT: CPT | Performed by: SURGERY

## 2024-07-15 RX ORDER — BETAMETHASONE SODIUM PHOSPHATE AND BETAMETHASONE ACETATE 3; 3 MG/ML; MG/ML
3 INJECTION, SUSPENSION INTRA-ARTICULAR; INTRALESIONAL; INTRAMUSCULAR; SOFT TISSUE
Status: COMPLETED | OUTPATIENT
Start: 2024-07-15 | End: 2024-07-15

## 2024-07-15 RX ORDER — LIDOCAINE HYDROCHLORIDE 10 MG/ML
2.5 INJECTION, SOLUTION INFILTRATION; PERINEURAL
Status: COMPLETED | OUTPATIENT
Start: 2024-07-15 | End: 2024-07-15

## 2024-07-15 RX ADMIN — LIDOCAINE HYDROCHLORIDE 2.5 ML: 10 INJECTION, SOLUTION INFILTRATION; PERINEURAL at 10:30

## 2024-07-15 RX ADMIN — BETAMETHASONE SODIUM PHOSPHATE AND BETAMETHASONE ACETATE 3 MG: 3; 3 INJECTION, SUSPENSION INTRA-ARTICULAR; INTRALESIONAL; INTRAMUSCULAR; SOFT TISSUE at 10:30

## 2024-07-15 NOTE — PROGRESS NOTES
ORTHOPAEDIC HAND, WRIST, AND ELBOW OFFICE  VISIT       ASSESSMENT/PLAN:      58 y.o. year old female who presents with Right 2nd MCP pain from Osteoarthritis and Right Index finger trigger finger    Physical exam was performed and the findings are consistent with trigger finger. She also has some arthritic changes noted at her 2nd MCP  Injections were offered for trigger fingers and we discussed they are 75-80% effective at resolving symptoms. They may have a repeat injection in 3 months if they get good relief of symptoms or full resolution that returns   Pt was offered, accepted, performed and steroid injection R Index fimger A1 pulleys for symptomatic relief. Pt tolerated injections well. Ice and post injection protocol advised. Weigh bearing activities as tolerated.  NSAID's as needed for pain  Activities as tolerated and increase as able.  No specific restrictions from our standpoint.  We discussed the injection can determine if her pain is from the trigger finger or more from the arthritis  XS Compression glove also provided. She should wear at night and may wear during the day    The patient verbalized understanding of exam findings and treatment plan. We engaged in the shared decision-making process and treatment options were discussed at length with the patient. Surgical and conservative management discussed today along with risks and benefits.    Diagnoses and all orders for this visit:    Trigger finger, right index finger  -     Hand/upper extremity injection: R index A1    Primary osteoarthritis of right hand  -     Ambulatory Referral to Orthopedic Surgery        Follow Up:  Return if symptoms worsen or fail to improve.      ____________________________________________________________________________________________________________________________________________      CHIEF COMPLAINT:  Chief Complaint   Patient presents with    Right Hand - Pain       SUBJECTIVE:  Mary Henry is a 58 y.o. year old  female who presents Right hand pain      Patient states he has had Right hand pain, predominately at her 2nd MCP, with activities. Some pain noted in her in PIP joint ad well. Her pain started may be 2 years ago with no known injury. She is using Celebrex but hand is no better. No injuries. Mornings are horrible until she gets moving. She states she has trouble gripping a coffee cup and at work her pain gets severe. 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  Some on/off numbness that comes and goes      I have personally reviewed all the relevant PMH, PSH, SH, FH, Medications and allergies      PAST MEDICAL HISTORY:  Past Medical History:   Diagnosis Date    ADD (attention deficit disorder) 2019    Anxiety 05/31/2013    Arthritis 2022    Gradually worse    Herpes simplex     EXTERNAL    IBS (irritable bowel syndrome)     Insomnia     Jaundice     Malignant melanoma in situ (HCC)     Pneumonia     PONV (postoperative nausea and vomiting)     Reactive airway disease        PAST SURGICAL HISTORY:  Past Surgical History:   Procedure Laterality Date    AUGMENTATION BREAST      AUGMENTATION MAMMAPLASTY Bilateral 28years ago    KNEE SURGERY      AR ARTHRS KNE SURG W/MENISCECTOMY MED/LAT W/SHVG Right 04/06/2021    Procedure: KNEE ARTHROSCOPY, medial lateral menisectomy,chondroplasty,synovectomy,inj;  Surgeon: Horace Luna DO;  Location:  MAIN OR;  Service: Orthopedics    SKIN BIOPSY  2013    2 PUNCH BIOPSIES    SKIN LESION EXCISION Right 2013    LEG    TONSILLECTOMY      TUBAL LIGATION      WOUND DEBRIDEMENT Left 10/19/2022    Procedure: DEBRIDEMENT WOUND AND DRESSING CHANGE (WASH OUT);  Surgeon: Anthony Daley MD;  Location: [facility];  Service: Orthopedics       FAMILY HISTORY:  Family History   Problem Relation Age of Onset    Heart disease Father     Diabetes Other         MELLITUS    Hyperlipidemia Other     ADD / ADHD Mother         She has trouble concentrating and is also depressed.    No Known  Problems Maternal Grandmother     No Known Problems Paternal Grandmother     No Known Problems Maternal Aunt     No Known Problems Maternal Aunt     No Known Problems Maternal Aunt        SOCIAL HISTORY:  Social History     Tobacco Use    Smoking status: Every Day     Current packs/day: 1.00     Average packs/day: 1 pack/day for 45.0 years (45.0 ttl pk-yrs)     Types: Cigarettes     Passive exposure: Current    Smokeless tobacco: Never   Vaping Use    Vaping status: Never Used   Substance Use Topics    Alcohol use: Yes     Comment: Socially Mostly summer parties.    Drug use: No       MEDICATIONS:    Current Outpatient Medications:     albuterol (PROVENTIL HFA,VENTOLIN HFA) 90 mcg/act inhaler, inhale 1 puff by mouth every 6 hours if needed for wheezing, Disp: 8.5 g, Rfl: 0    celecoxib (CeleBREX) 200 mg capsule, Take 1 capsule (200 mg total) by mouth 2 (two) times a day, Disp: 60 capsule, Rfl: 3    escitalopram (LEXAPRO) 20 mg tablet, TAKE 1 TABLET BY MOUTH EVERY DAY, Disp: 90 tablet, Rfl: 1    levothyroxine (Euthyrox) 25 mcg tablet, Take 1 tablet (25 mcg total) by mouth daily in the early morning, Disp: 90 tablet, Rfl: 3    lisdexamfetamine (VYVANSE) 30 MG capsule, Take 1 capsule (30 mg total) by mouth every morning Max Daily Amount: 30 mg, Disp: 90 capsule, Rfl: 0    lisdexamfetamine (VYVANSE) 50 MG capsule, Take 1 capsule (50 mg total) by mouth every morning Max Daily Amount: 50 mg, Disp: 90 capsule, Rfl: 0    PreviDent 5000 Sensitive 1.1-5 % GEL, BRUSH 1 TIME AT NIGHT FOR ONE MIN NOTHING MY MOUTH FOR 30 MIN, Disp: , Rfl:     zolpidem (AMBIEN CR) 6.25 MG CR tablet, Take 1 tablet (6.25 mg total) by mouth daily at bedtime as needed for sleep, Disp: 30 tablet, Rfl: 0    valACYclovir (VALTREX) 1,000 mg tablet, Take 1 tablet (1,000 mg total) by mouth 3 (three) times a day for 7 days, Disp: 21 tablet, Rfl: 0    ALLERGIES:  Allergies   Allergen Reactions    Bacitracin Rash    Neomycin Rash    Polymyxin B Rash     "Vilazodone Other (See Comments)     Other reaction(s): abdomen pain           REVIEW OF SYSTEMS:  Review of Systems   Constitutional:  Negative for chills and fever.   HENT:  Negative for ear pain and sore throat.    Eyes:  Negative for pain and visual disturbance.   Respiratory:  Negative for cough and shortness of breath.    Cardiovascular:  Negative for chest pain and palpitations.   Gastrointestinal:  Negative for abdominal pain and vomiting.   Genitourinary:  Negative for dysuria and hematuria.   Musculoskeletal:  Positive for arthralgias. Negative for back pain.   Skin:  Negative for color change and rash.   Neurological:  Negative for seizures and syncope.   All other systems reviewed and are negative.      VITALS:  Vitals:    07/15/24 1013   BP: 128/80       LABS:  HgA1c: No results found for: \"HGBA1C\"  BMP:   Lab Results   Component Value Date    CALCIUM 9.0 03/23/2024    K 4.8 03/23/2024    CO2 30 03/23/2024     03/23/2024    BUN 12 03/23/2024    CREATININE 0.63 03/23/2024       _____________________________________________________  PHYSICAL EXAMINATION:  General: well developed and well nourished, alert, oriented times 3, and appears comfortable  Psychiatric: Normal  HEENT: Normocephalic, Atraumatic Trachea Midline, No torticollis  Pulmonary: No audible wheezing or respiratory distress   Abdomen/GI: Non tender, non distended   Cardiovascular: No pitting edema, 2+ radial pulse   Skin: No masses, erythema, lacerations, fluctation, ulcerations  Neurovascular: Sensation Intact to the Median, Ulnar, Radial Nerve, Motor Intact to the Median, Ulnar, Radial Nerve, and Pulses Intact  Musculoskeletal: Normal, except as noted in detailed exam and in HPI.      MUSCULOSKELETAL EXAMINATION:  Right hand:  SILT  Composite fist  Tender at A1 pulley  Arthritic changes noted at dorsal MCP    Left hand:  SILT  Composite fist      ___________________________________________________  STUDIES REVIEWED:  No images " obtained          PROCEDURES PERFORMED:  Hand/upper extremity injection: R index A1  Universal Protocol:  Consent: Verbal consent obtained.  Risks and benefits: risks, benefits and alternatives were discussed  Consent given by: patient  Timeout called at: 7/15/2024 10:33 AM.  Patient understanding: patient states understanding of the procedure being performed  Site marked: the operative site was marked  Patient identity confirmed: verbally with patient  Procedure Details  Condition:trigger finger Location: index finger - R index A1   Needle size: 22 G  Ultrasound guidance: no  Approach: volar  Medications administered: 2.5 mL lidocaine 1 %; 3 mg betamethasone acetate-betamethasone sodium phosphate 6 (3-3) mg/mL  Patient tolerance: patient tolerated the procedure well with no immediate complications  Dressing:  Sterile dressing applied           _____________________________________________________      Scribe Attestation      I,:  Dudley Thompson am acting as a scribe while in the presence of the attending physician.:       I,:  Mirza Galicia MD personally performed the services described in this documentation    as scribed in my presence.:

## 2024-07-22 DIAGNOSIS — M19.041 PRIMARY OSTEOARTHRITIS OF RIGHT HAND: ICD-10-CM

## 2024-07-22 DIAGNOSIS — B35.1 TINEA UNGUIUM: Primary | ICD-10-CM

## 2024-07-22 RX ORDER — CELECOXIB 200 MG/1
200 CAPSULE ORAL 2 TIMES DAILY
Qty: 180 CAPSULE | Refills: 1 | Status: SHIPPED | OUTPATIENT
Start: 2024-07-22

## 2024-07-23 ENCOUNTER — TELEPHONE (OUTPATIENT)
Dept: INTERNAL MEDICINE CLINIC | Facility: CLINIC | Age: 59
End: 2024-07-23

## 2024-08-02 ENCOUNTER — TELEPHONE (OUTPATIENT)
Age: 59
End: 2024-08-02

## 2024-08-02 NOTE — TELEPHONE ENCOUNTER
She recently received a steroid injection in the office.  Would recommend NSAIDs PRN, and use the compression glove at nighttime which may provide some relief.  Thank you.

## 2024-08-02 NOTE — TELEPHONE ENCOUNTER
Caller: Patient     Doctor: Grayson     Reason for call: Patient asked if there is any injections or treatment for the right hand, patient received an injection and states the pain has started     Call back#: [phone]

## 2024-08-11 DIAGNOSIS — R06.02 SOB (SHORTNESS OF BREATH): ICD-10-CM

## 2024-08-11 DIAGNOSIS — R06.2 WHEEZING: ICD-10-CM

## 2024-08-11 DIAGNOSIS — F90.2 ATTENTION DEFICIT HYPERACTIVITY DISORDER (ADHD), COMBINED TYPE: ICD-10-CM

## 2024-08-11 DIAGNOSIS — M19.041 PRIMARY OSTEOARTHRITIS OF RIGHT HAND: ICD-10-CM

## 2024-08-11 RX ORDER — CELECOXIB 200 MG/1
200 CAPSULE ORAL 2 TIMES DAILY
Qty: 180 CAPSULE | Refills: 0 | Status: SHIPPED | OUTPATIENT
Start: 2024-08-11

## 2024-08-12 RX ORDER — LISDEXAMFETAMINE DIMESYLATE 50 MG/1
50 CAPSULE ORAL EVERY MORNING
Qty: 90 CAPSULE | Refills: 0 | Status: SHIPPED | OUTPATIENT
Start: 2024-08-12

## 2024-08-12 RX ORDER — ALBUTEROL SULFATE 90 UG/1
1 AEROSOL, METERED RESPIRATORY (INHALATION) EVERY 4 HOURS PRN
Qty: 8.5 G | Refills: 1 | Status: SHIPPED | OUTPATIENT
Start: 2024-08-12

## 2024-08-12 RX ORDER — LISDEXAMFETAMINE DIMESYLATE 30 MG/1
30 CAPSULE ORAL EVERY MORNING
Qty: 90 CAPSULE | Refills: 0 | Status: SHIPPED | OUTPATIENT
Start: 2024-08-12

## 2024-08-16 ENCOUNTER — TELEPHONE (OUTPATIENT)
Age: 59
End: 2024-08-16

## 2024-08-19 ENCOUNTER — OFFICE VISIT (OUTPATIENT)
Dept: OBGYN CLINIC | Facility: CLINIC | Age: 59
End: 2024-08-19
Payer: COMMERCIAL

## 2024-08-19 VITALS
SYSTOLIC BLOOD PRESSURE: 130 MMHG | DIASTOLIC BLOOD PRESSURE: 90 MMHG | WEIGHT: 149 LBS | HEIGHT: 67 IN | BODY MASS INDEX: 23.39 KG/M2

## 2024-08-19 DIAGNOSIS — M65.321 TRIGGER FINGER, RIGHT INDEX FINGER: Primary | ICD-10-CM

## 2024-08-19 PROCEDURE — 99214 OFFICE O/P EST MOD 30 MIN: CPT | Performed by: SURGERY

## 2024-08-19 RX ORDER — SODIUM CHLORIDE, SODIUM LACTATE, POTASSIUM CHLORIDE, CALCIUM CHLORIDE 600; 310; 30; 20 MG/100ML; MG/100ML; MG/100ML; MG/100ML
125 INJECTION, SOLUTION INTRAVENOUS CONTINUOUS
OUTPATIENT
Start: 2024-08-19

## 2024-08-19 NOTE — PROGRESS NOTES
Assessment    Right index trigger finger      Plan    The patient is a candidate for right index trigger finger release procedure under IV sedation.  She wishes to pursue this option.  Earliest surgical date would be 10/15/2024 due to recent steroid injection.  Risks, benefits and alternative treatments were discussed with the patient. These included but are not limited to: bleeding, infection, damage to nerves, vessels or tendons, allergic reaction to agents, possible increase in pain, tendon or ligament rupture, weakening of bone or soft tissues, and/or elevation in blood sugar. Patient understands and would like to proceed with the proposed procedure.    Follow-up 2 weeks after surgery.        Subjective     HPI    Patient ID:  Mary Henry is a right hand dominant 58 y.o. female here for follow-up.  She was seen last month and received steroid injection for right index trigger finger.  Today, she states the injection helped for about 3 weeks where she had minimal symptoms however they returned shortly thereafter.  Symptoms are pain and locking MCP region.  She has pain and swelling about the A1 pulley and MCP region.  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  She has no history of surgery to the right index finger.  She does have a history of left hand infection that was washed out in the OR by orthopedic team a few years ago.      The following portions of the patient's history were reviewed and updated as appropriate: allergies, current medications, past family history, past medical history, past social history, past surgical history, and problem list.    Review of Systems     Objective    Imaging:  Right hand x-rays performed in the office today 8/19/2024 demonstrate no significant arthritic changes, no acute findings.    Physical Exam     General appearance:  NAD   Cardiac:  Regular rate  Lungs:  Unlabored breathing  Abdomen:  Non-distended    Orthopedic Examination:  Right index  finger    Inspection: No open wounds or erythema.  No ecchymosis.  + MCP swelling/fullness.    Palpation:  + TTP with palpable nodule A1 pulley.  + TTP Dorsal MCP region.    Range-of-motion:  + Crepitus and clicking with ROM.  No active locking.    Strength:  Normal.    Sensation:  ILT    Special Tests:  Good cap refill at the fingertip.  Palpable radial pulse.

## 2024-08-29 DIAGNOSIS — F90.2 ATTENTION DEFICIT HYPERACTIVITY DISORDER (ADHD), COMBINED TYPE: ICD-10-CM

## 2024-08-29 RX ORDER — LISDEXAMFETAMINE DIMESYLATE 50 MG/1
50 CAPSULE ORAL EVERY MORNING
Qty: 90 CAPSULE | Refills: 0 | Status: SHIPPED | OUTPATIENT
Start: 2024-08-29

## 2024-08-29 RX ORDER — LISDEXAMFETAMINE DIMESYLATE 30 MG/1
30 CAPSULE ORAL EVERY MORNING
Qty: 90 CAPSULE | Refills: 0 | Status: SHIPPED | OUTPATIENT
Start: 2024-08-29

## 2024-09-06 ENCOUNTER — TELEPHONE (OUTPATIENT)
Dept: OBGYN CLINIC | Facility: HOSPITAL | Age: 59
End: 2024-09-06

## 2024-09-10 ENCOUNTER — OFFICE VISIT (OUTPATIENT)
Dept: OBGYN CLINIC | Facility: CLINIC | Age: 59
End: 2024-09-10
Payer: COMMERCIAL

## 2024-09-10 VITALS
SYSTOLIC BLOOD PRESSURE: 120 MMHG | BODY MASS INDEX: 23.39 KG/M2 | HEIGHT: 67 IN | DIASTOLIC BLOOD PRESSURE: 76 MMHG | WEIGHT: 149 LBS

## 2024-09-10 DIAGNOSIS — M65.321 TRIGGER FINGER, RIGHT INDEX FINGER: Primary | ICD-10-CM

## 2024-09-10 PROCEDURE — 99212 OFFICE O/P EST SF 10 MIN: CPT | Performed by: SURGERY

## 2024-09-10 NOTE — PROGRESS NOTES
Assessment    Right index trigger finger      Plan    Patient is scheduled for right index trigger finger release surgery next month.  Work restrictions note provided today.  Follow-up postoperatively.        Subjective     HPI    Patient ID:  Mary Henry is a right hand dominant 58 y.o. female here for follow-up.  She is here today with continued pain in the right index finger, and is specifically looking for work restrictions until surgery date.  She is scheduled for right index trigger finger surgery next month.      The following portions of the patient's history were reviewed and updated as appropriate: allergies, current medications, past family history, past medical history, past social history, past surgical history, and problem list.    Review of Systems     Objective    Imaging:  Right hand x-rays performed in the office today 8/19/2024 demonstrate no significant arthritic changes, no acute findings.    Physical Exam     General appearance:  NAD   Cardiac:  Regular rate  Lungs:  Unlabored breathing  Abdomen:  Non-distended    Orthopedic Examination:  Right index finger    Inspection: No open wounds or erythema.  No ecchymosis.  + MCP swelling/fullness.    Palpation:  + TTP with palpable nodule A1 pulley.  + TTP Dorsal MCP region.    Range-of-motion:  + Crepitus and clicking with ROM.  No active locking.    Strength:  Normal.    Sensation:  ILT    Special Tests:  Good cap refill at the fingertip.  Palpable radial pulse.

## 2024-09-10 NOTE — LETTER
September 10, 2024     Patient: Mary Henry  YOB: 1965  Date of Visit: 9/10/2024      To Whom it May Concern:    Mary Henry is under my professional care. Mary was seen in my office on 9/10/2024. Mary is to remain out of work until surgery date 10/22/2024.  She will be re-evaluated at post-op visit on 11/5/2024.  Further work updates will be provided then.    If you have any questions or concerns, please don't hesitate to call.         Sincerely,          Mirza Galicia MD        CC: No Recipients

## 2024-09-28 DIAGNOSIS — F41.9 ANXIETY: ICD-10-CM

## 2024-09-28 DIAGNOSIS — F32.A DEPRESSIVE DISORDER: ICD-10-CM

## 2024-09-28 RX ORDER — ESCITALOPRAM OXALATE 20 MG/1
20 TABLET ORAL DAILY
Qty: 90 TABLET | Refills: 0 | Status: SHIPPED | OUTPATIENT
Start: 2024-09-28

## 2024-10-01 ENCOUNTER — OFFICE VISIT (OUTPATIENT)
Dept: LAB | Facility: HOSPITAL | Age: 59
End: 2024-10-01
Payer: COMMERCIAL

## 2024-10-01 ENCOUNTER — APPOINTMENT (OUTPATIENT)
Dept: LAB | Facility: CLINIC | Age: 59
End: 2024-10-01
Payer: COMMERCIAL

## 2024-10-01 ENCOUNTER — OFFICE VISIT (OUTPATIENT)
Dept: INTERNAL MEDICINE CLINIC | Facility: CLINIC | Age: 59
End: 2024-10-01
Payer: COMMERCIAL

## 2024-10-01 VITALS
OXYGEN SATURATION: 98 % | BODY MASS INDEX: 22.93 KG/M2 | TEMPERATURE: 96.5 F | SYSTOLIC BLOOD PRESSURE: 154 MMHG | HEIGHT: 67 IN | DIASTOLIC BLOOD PRESSURE: 98 MMHG | HEART RATE: 84 BPM | WEIGHT: 146.13 LBS

## 2024-10-01 DIAGNOSIS — M65.321 TRIGGER FINGER, RIGHT INDEX FINGER: ICD-10-CM

## 2024-10-01 DIAGNOSIS — Z12.4 SCREENING FOR CERVICAL CANCER: ICD-10-CM

## 2024-10-01 DIAGNOSIS — Z12.11 SCREENING FOR COLON CANCER: ICD-10-CM

## 2024-10-01 DIAGNOSIS — B35.1 TINEA UNGUIUM: Primary | ICD-10-CM

## 2024-10-01 DIAGNOSIS — M19.041 OSTEOARTHRITIS OF FINGER, RIGHT: ICD-10-CM

## 2024-10-01 DIAGNOSIS — Z23 ENCOUNTER FOR IMMUNIZATION: ICD-10-CM

## 2024-10-01 LAB
ANION GAP SERPL CALCULATED.3IONS-SCNC: 10 MMOL/L (ref 4–13)
ATRIAL RATE: 67 BPM
BUN SERPL-MCNC: 17 MG/DL (ref 5–25)
CALCIUM SERPL-MCNC: 9.7 MG/DL (ref 8.4–10.2)
CHLORIDE SERPL-SCNC: 102 MMOL/L (ref 96–108)
CO2 SERPL-SCNC: 26 MMOL/L (ref 21–32)
CREAT SERPL-MCNC: 0.73 MG/DL (ref 0.6–1.3)
ERYTHROCYTE [DISTWIDTH] IN BLOOD BY AUTOMATED COUNT: 13 % (ref 11.6–15.1)
GFR SERPL CREATININE-BSD FRML MDRD: 91 ML/MIN/1.73SQ M
GLUCOSE SERPL-MCNC: 100 MG/DL (ref 65–140)
HCT VFR BLD AUTO: 44.7 % (ref 34.8–46.1)
HGB BLD-MCNC: 14.6 G/DL (ref 11.5–15.4)
MCH RBC QN AUTO: 29.6 PG (ref 26.8–34.3)
MCHC RBC AUTO-ENTMCNC: 32.7 G/DL (ref 31.4–37.4)
MCV RBC AUTO: 91 FL (ref 82–98)
P AXIS: 74 DEGREES
PLATELET # BLD AUTO: 287 THOUSANDS/UL (ref 149–390)
PMV BLD AUTO: 11.5 FL (ref 8.9–12.7)
POTASSIUM SERPL-SCNC: 4.8 MMOL/L (ref 3.5–5.3)
PR INTERVAL: 158 MS
QRS AXIS: 39 DEGREES
QRSD INTERVAL: 82 MS
QT INTERVAL: 394 MS
QTC INTERVAL: 416 MS
RBC # BLD AUTO: 4.93 MILLION/UL (ref 3.81–5.12)
SODIUM SERPL-SCNC: 138 MMOL/L (ref 135–147)
T WAVE AXIS: 56 DEGREES
VENTRICULAR RATE: 67 BPM
WBC # BLD AUTO: 8.79 THOUSAND/UL (ref 4.31–10.16)

## 2024-10-01 PROCEDURE — 99213 OFFICE O/P EST LOW 20 MIN: CPT | Performed by: PHYSICIAN ASSISTANT

## 2024-10-01 PROCEDURE — 93005 ELECTROCARDIOGRAM TRACING: CPT

## 2024-10-01 PROCEDURE — 36415 COLL VENOUS BLD VENIPUNCTURE: CPT

## 2024-10-01 PROCEDURE — 85027 COMPLETE CBC AUTOMATED: CPT

## 2024-10-01 PROCEDURE — 80048 BASIC METABOLIC PNL TOTAL CA: CPT

## 2024-10-01 PROCEDURE — 93010 ELECTROCARDIOGRAM REPORT: CPT | Performed by: INTERNAL MEDICINE

## 2024-10-01 RX ORDER — MUPIROCIN 20 MG/G
OINTMENT TOPICAL
COMMUNITY
Start: 2024-09-28

## 2024-10-01 NOTE — PROGRESS NOTES
Ambulatory Visit  Name: Mary Henry      : 1965      MRN: [id number]  Encounter Provider: Janine Nix PA-C  Encounter Date: 10/1/2024   Encounter department: [facility]    Assessment & Plan  Screening for cervical cancer         Encounter for immunization    Orders:    Pneumococcal Conjugate Vaccine 20-valent (Pcv20)    Zoster Vaccine Recombinant IM    influenza vaccine, recombinant, PF, 0.5 mL IM (Flublok)    Screening for colon cancer         Tinea unguium  Continue with upcoming podiatry appt.          Osteoarthritis of finger, right  Proceed with upcoming surgery           Depression Screening and Follow-up Plan: Patient's depression screening was positive with a PHQ-9 score of 5. Continue regular follow-up with their mental health provider who is managing their mental health condition(s).       History of Present Illness     Pt presents for routine visit. She is up to date with labs and mammogram. She is due for CRC screening and pap but defers both. She notes increased stress as within the last 24 hours her  was taken to the ER for hematuria and CT revealed renal mass with possible metastatic component and is now being transferred to our [facility]. She is understandably upset, tearful and overwhelmed by this. BP is elevated as a result, will monitor.   She is seeing podiatry this week for her toenail fungus and also has trigger finger surgery scheduled for the end of the month.         History obtained from : patient  Review of Systems   Constitutional:  Negative for chills and fever.   HENT:  Negative for congestion, ear pain, hearing loss, postnasal drip, rhinorrhea, sinus pressure, sinus pain, sore throat and trouble swallowing.    Eyes:  Negative for pain and visual disturbance.   Respiratory:  Negative for cough, chest tightness, shortness of breath and wheezing.    Cardiovascular: Negative.  Negative for chest pain, palpitations and leg swelling.  "  Gastrointestinal:  Negative for abdominal pain, blood in stool, constipation, diarrhea, nausea and vomiting.   Endocrine: Negative for cold intolerance, heat intolerance, polydipsia, polyphagia and polyuria.   Genitourinary:  Negative for difficulty urinating, dysuria, flank pain and urgency.   Musculoskeletal:  Negative for arthralgias, back pain, gait problem and myalgias.   Skin:  Negative for rash.   Allergic/Immunologic: Negative.    Neurological:  Negative for dizziness, weakness, light-headedness and headaches.   Hematological: Negative.    Psychiatric/Behavioral:  Negative for behavioral problems, dysphoric mood and sleep disturbance. The patient is not nervous/anxious.            Objective     /98   Pulse 84   Temp (!) 96.5 °F (35.8 °C)   Ht 5' 6.5\" (1.689 m)   Wt 66.3 kg (146 lb 2 oz)   LMP 02/01/2021   SpO2 98%   BMI 23.23 kg/m²     Physical Exam  Vitals and nursing note reviewed.   Constitutional:       General: She is not in acute distress.     Appearance: Normal appearance. She is well-developed. She is not diaphoretic.   HENT:      Head: Normocephalic and atraumatic.      Right Ear: External ear normal.      Left Ear: External ear normal.      Nose: Nose normal.      Mouth/Throat:      Pharynx: No oropharyngeal exudate.   Eyes:      General: No scleral icterus.        Right eye: No discharge.         Left eye: No discharge.      Conjunctiva/sclera: Conjunctivae normal.      Pupils: Pupils are equal, round, and reactive to light.   Neck:      Thyroid: No thyromegaly.   Cardiovascular:      Rate and Rhythm: Normal rate and regular rhythm.      Heart sounds: Normal heart sounds. No murmur heard.     No friction rub. No gallop.   Pulmonary:      Effort: Pulmonary effort is normal. No respiratory distress.      Breath sounds: Normal breath sounds. No wheezing or rales.   Abdominal:      General: Bowel sounds are normal. There is no distension.      Palpations: Abdomen is soft.      " Tenderness: There is no abdominal tenderness.   Musculoskeletal:         General: No tenderness or deformity. Normal range of motion.      Cervical back: Normal range of motion and neck supple.   Skin:     General: Skin is warm and dry.   Neurological:      Mental Status: She is alert and oriented to person, place, and time.      Cranial Nerves: No cranial nerve deficit.   Psychiatric:         Behavior: Behavior normal.         Thought Content: Thought content normal.         Judgment: Judgment normal.       Administrative Statements   I have spent a total time of 20 minutes in caring for this patient on the day of the visit/encounter including Instructions for management, Patient and family education, Importance of tx compliance, Risk factor reductions, Impressions, Counseling / Coordination of care, Documenting in the medical record, Reviewing / ordering tests, medicine, procedures  , and Obtaining or reviewing history  .

## 2024-10-04 ENCOUNTER — OFFICE VISIT (OUTPATIENT)
Dept: PODIATRY | Facility: CLINIC | Age: 59
End: 2024-10-04
Payer: COMMERCIAL

## 2024-10-04 VITALS
DIASTOLIC BLOOD PRESSURE: 96 MMHG | BODY MASS INDEX: 22.76 KG/M2 | HEART RATE: 96 BPM | WEIGHT: 145 LBS | HEIGHT: 67 IN | SYSTOLIC BLOOD PRESSURE: 145 MMHG

## 2024-10-04 DIAGNOSIS — B35.1 TINEA UNGUIUM: ICD-10-CM

## 2024-10-04 PROCEDURE — 99203 OFFICE O/P NEW LOW 30 MIN: CPT | Performed by: PODIATRIST

## 2024-10-04 RX ORDER — TERBINAFINE HYDROCHLORIDE 250 MG/1
250 TABLET ORAL 2 TIMES DAILY
Qty: 14 TABLET | Refills: 3 | Status: SHIPPED | OUTPATIENT
Start: 2024-10-04 | End: 2024-10-11

## 2024-10-04 RX ORDER — CICLOPIROX 80 MG/ML
SOLUTION TOPICAL
Qty: 6.6 ML | Refills: 0 | Status: SHIPPED | OUTPATIENT
Start: 2024-10-04

## 2024-10-04 NOTE — ASSESSMENT & PLAN NOTE
Orders:    Ambulatory Referral to Podiatry    terbinafine (LamISIL) 250 mg tablet; Take 1 tablet (250 mg total) by mouth 2 (two) times a day for 7 days    ciclopirox (PENLAC) 8 % solution; Apply topically daily at bedtime

## 2024-10-04 NOTE — PROGRESS NOTES
"Ambulatory Visit  Name: Mary Henry      : 1965      MRN: [id number]  Encounter Provider: Anthony Dubose DPM  Encounter Date: 10/4/2024   Encounter department: [facility] PODIATRY [place]    Assessment & Plan  Tinea unguium    Orders:    Ambulatory Referral to Podiatry    terbinafine (LamISIL) 250 mg tablet; Take 1 tablet (250 mg total) by mouth 2 (two) times a day for 7 days    ciclopirox (PENLAC) 8 % solution; Apply topically daily at bedtime      - Would recommend use of Penlac for maintenance.   - Rx given  - Discussed use of topical cream for maintenance of athletes foot  - discussed the rate of fungal recurrence.   - RTC prn for continued care.         History of Present Illness     Mary Henry is a 59 y.o. female who presents  for evaluation and management of her bilateral feet. Has had lamisil treatment in the past with success. Works for [employer] and is on her feet all day. Does use drying agents. Does have some nail involvement on her toes and fingers.       Review of Systems   Constitutional:  Negative for chills and fever.   HENT:  Negative for ear pain and sore throat.    Eyes:  Negative for pain and visual disturbance.   Respiratory:  Negative for cough and shortness of breath.    Cardiovascular:  Negative for chest pain and palpitations.   Gastrointestinal:  Negative for abdominal pain and vomiting.   Genitourinary:  Negative for dysuria and hematuria.   Musculoskeletal:  Negative for arthralgias and back pain.   Skin:  Negative for color change and rash.   Neurological:  Negative for seizures and syncope.   All other systems reviewed and are negative.          Objective     /96 (BP Location: Left arm, Patient Position: Sitting, Cuff Size: Standard)   Pulse 96   Ht 5' 6.5\" (1.689 m)   Wt 65.8 kg (145 lb)   LMP 2021   BMI 23.05 kg/m²     Physical Exam  Vitals reviewed.   Constitutional:       Appearance: Normal appearance.   HENT:      Head: Normocephalic and " atraumatic.      Nose: Nose normal.      Mouth/Throat:      Mouth: Mucous membranes are moist.      Pharynx: Oropharynx is clear.   Eyes:      Pupils: Pupils are equal, round, and reactive to light.   Cardiovascular:      Pulses: Normal pulses.   Pulmonary:      Effort: Pulmonary effort is normal.   Abdominal:      General: Abdomen is flat. Bowel sounds are normal.   Musculoskeletal:      Comments: Nails 1-5 b/l feet has some degree of fungal involvement all toenails.    Skin:     Capillary Refill: Capillary refill takes 2 to 3 seconds.   Neurological:      General: No focal deficit present.      Mental Status: She is alert and oriented to person, place, and time. Mental status is at baseline.

## 2024-10-21 ENCOUNTER — ANESTHESIA EVENT (OUTPATIENT)
Dept: PERIOP | Facility: HOSPITAL | Age: 59
End: 2024-10-21
Payer: COMMERCIAL

## 2024-10-22 ENCOUNTER — HOSPITAL ENCOUNTER (OUTPATIENT)
Facility: HOSPITAL | Age: 59
Setting detail: OUTPATIENT SURGERY
Discharge: HOME | End: 2024-10-22
Attending: SURGERY | Admitting: SURGERY
Payer: COMMERCIAL

## 2024-10-22 ENCOUNTER — ANESTHESIA (OUTPATIENT)
Dept: PERIOP | Facility: HOSPITAL | Age: 59
End: 2024-10-22
Payer: COMMERCIAL

## 2024-10-22 VITALS
SYSTOLIC BLOOD PRESSURE: 118 MMHG | TEMPERATURE: 96.5 F | HEIGHT: 66 IN | RESPIRATION RATE: 18 BRPM | HEART RATE: 67 BPM | BODY MASS INDEX: 23.3 KG/M2 | DIASTOLIC BLOOD PRESSURE: 80 MMHG | WEIGHT: 145 LBS | OXYGEN SATURATION: 97 %

## 2024-10-22 DIAGNOSIS — M65.321 TRIGGER FINGER, RIGHT INDEX FINGER: Primary | ICD-10-CM

## 2024-10-22 PROCEDURE — NC001 PR NO CHARGE: Performed by: SURGERY

## 2024-10-22 PROCEDURE — 26055 INCISE FINGER TENDON SHEATH: CPT | Performed by: SURGERY

## 2024-10-22 PROCEDURE — 26055 INCISE FINGER TENDON SHEATH: CPT | Performed by: PHYSICIAN ASSISTANT

## 2024-10-22 RX ORDER — ACETAMINOPHEN AND CODEINE PHOSPHATE 300; 30 MG/1; MG/1
1 TABLET ORAL EVERY 12 HOURS PRN
Qty: 5 TABLET | Refills: 0 | Status: SHIPPED | OUTPATIENT
Start: 2024-10-22

## 2024-10-22 RX ORDER — DIPHENHYDRAMINE HYDROCHLORIDE 50 MG/ML
12.5 INJECTION INTRAMUSCULAR; INTRAVENOUS
Status: DISCONTINUED | OUTPATIENT
Start: 2024-10-22 | End: 2024-10-22 | Stop reason: HOSPADM

## 2024-10-22 RX ORDER — SODIUM CHLORIDE, SODIUM LACTATE, POTASSIUM CHLORIDE, CALCIUM CHLORIDE 600; 310; 30; 20 MG/100ML; MG/100ML; MG/100ML; MG/100ML
125 INJECTION, SOLUTION INTRAVENOUS CONTINUOUS
Status: DISCONTINUED | OUTPATIENT
Start: 2024-10-22 | End: 2024-10-22 | Stop reason: HOSPADM

## 2024-10-22 RX ORDER — KETOROLAC TROMETHAMINE 30 MG/ML
INJECTION, SOLUTION INTRAMUSCULAR; INTRAVENOUS AS NEEDED
Status: DISCONTINUED | OUTPATIENT
Start: 2024-10-22 | End: 2024-10-22

## 2024-10-22 RX ORDER — SODIUM CHLORIDE, SODIUM LACTATE, POTASSIUM CHLORIDE, CALCIUM CHLORIDE 600; 310; 30; 20 MG/100ML; MG/100ML; MG/100ML; MG/100ML
INJECTION, SOLUTION INTRAVENOUS CONTINUOUS PRN
Status: DISCONTINUED | OUTPATIENT
Start: 2024-10-22 | End: 2024-10-22

## 2024-10-22 RX ORDER — HYDROMORPHONE HCL/PF 1 MG/ML
0.5 SYRINGE (ML) INJECTION
Status: DISCONTINUED | OUTPATIENT
Start: 2024-10-22 | End: 2024-10-22 | Stop reason: HOSPADM

## 2024-10-22 RX ORDER — ONDANSETRON 2 MG/ML
4 INJECTION INTRAMUSCULAR; INTRAVENOUS ONCE AS NEEDED
Status: DISCONTINUED | OUTPATIENT
Start: 2024-10-22 | End: 2024-10-22 | Stop reason: HOSPADM

## 2024-10-22 RX ORDER — PROPOFOL 10 MG/ML
INJECTION, EMULSION INTRAVENOUS AS NEEDED
Status: DISCONTINUED | OUTPATIENT
Start: 2024-10-22 | End: 2024-10-22

## 2024-10-22 RX ORDER — LIDOCAINE HYDROCHLORIDE 10 MG/ML
INJECTION, SOLUTION EPIDURAL; INFILTRATION; INTRACAUDAL; PERINEURAL AS NEEDED
Status: DISCONTINUED | OUTPATIENT
Start: 2024-10-22 | End: 2024-10-22

## 2024-10-22 RX ORDER — ACETAMINOPHEN 325 MG/1
650 TABLET ORAL EVERY 6 HOURS PRN
Status: DISCONTINUED | OUTPATIENT
Start: 2024-10-22 | End: 2024-10-22 | Stop reason: HOSPADM

## 2024-10-22 RX ORDER — OXYCODONE HYDROCHLORIDE 5 MG/1
5 TABLET ORAL EVERY 4 HOURS PRN
Status: DISCONTINUED | OUTPATIENT
Start: 2024-10-22 | End: 2024-10-22 | Stop reason: HOSPADM

## 2024-10-22 RX ORDER — ONDANSETRON 2 MG/ML
INJECTION INTRAMUSCULAR; INTRAVENOUS AS NEEDED
Status: DISCONTINUED | OUTPATIENT
Start: 2024-10-22 | End: 2024-10-22

## 2024-10-22 RX ORDER — MAGNESIUM HYDROXIDE 1200 MG/15ML
LIQUID ORAL AS NEEDED
Status: DISCONTINUED | OUTPATIENT
Start: 2024-10-22 | End: 2024-10-22 | Stop reason: HOSPADM

## 2024-10-22 RX ORDER — METOCLOPRAMIDE HYDROCHLORIDE 5 MG/ML
10 INJECTION INTRAMUSCULAR; INTRAVENOUS ONCE AS NEEDED
Status: COMPLETED | OUTPATIENT
Start: 2024-10-22 | End: 2024-10-22

## 2024-10-22 RX ORDER — MIDAZOLAM HYDROCHLORIDE 2 MG/2ML
INJECTION, SOLUTION INTRAMUSCULAR; INTRAVENOUS AS NEEDED
Status: DISCONTINUED | OUTPATIENT
Start: 2024-10-22 | End: 2024-10-22

## 2024-10-22 RX ORDER — FENTANYL CITRATE 50 UG/ML
25 INJECTION, SOLUTION INTRAMUSCULAR; INTRAVENOUS
Status: DISCONTINUED | OUTPATIENT
Start: 2024-10-22 | End: 2024-10-22 | Stop reason: HOSPADM

## 2024-10-22 RX ADMIN — METOCLOPRAMIDE 10 MG: 5 INJECTION, SOLUTION INTRAMUSCULAR; INTRAVENOUS at 07:50

## 2024-10-22 RX ADMIN — SODIUM CHLORIDE, SODIUM LACTATE, POTASSIUM CHLORIDE, AND CALCIUM CHLORIDE: .6; .31; .03; .02 INJECTION, SOLUTION INTRAVENOUS at 07:18

## 2024-10-22 RX ADMIN — PROPOFOL 80 MG: 10 INJECTION, EMULSION INTRAVENOUS at 07:25

## 2024-10-22 RX ADMIN — PROPOFOL 50 MCG/KG/MIN: 10 INJECTION, EMULSION INTRAVENOUS at 07:26

## 2024-10-22 RX ADMIN — ONDANSETRON 4 MG: 2 INJECTION INTRAMUSCULAR; INTRAVENOUS at 07:38

## 2024-10-22 RX ADMIN — KETOROLAC TROMETHAMINE 15 MG: 30 INJECTION, SOLUTION INTRAMUSCULAR; INTRAVENOUS at 07:38

## 2024-10-22 RX ADMIN — LIDOCAINE HYDROCHLORIDE 50 MG: 10 SOLUTION INTRAVENOUS at 07:25

## 2024-10-22 RX ADMIN — MIDAZOLAM 2 MG: 1 INJECTION INTRAMUSCULAR; INTRAVENOUS at 07:17

## 2024-10-22 RX ADMIN — SODIUM CHLORIDE, SODIUM LACTATE, POTASSIUM CHLORIDE, AND CALCIUM CHLORIDE 125 ML/HR: .6; .31; .03; .02 INJECTION, SOLUTION INTRAVENOUS at 06:13

## 2024-10-22 NOTE — ANESTHESIA POSTPROCEDURE EVALUATION
Post-Op Assessment Note    CV Status:  Stable    Pain management: adequate       Mental Status:  Awake and alert   Hydration Status:  Stable   PONV Controlled:  None   Airway Patency:  Patent     Post Op Vitals Reviewed: Yes    No anethesia notable event occurred.    Staff: Anesthesiologist           Last Filed PACU Vitals:  Vitals Value Taken Time   Temp 97.5 °F (36.4 °C) 10/22/24 0744   Pulse 72 10/22/24 0817   /67 10/22/24 0815   Resp 34 10/22/24 0817   SpO2 95 % 10/22/24 0816   Vitals shown include unfiled device data.    Modified Lucia:  Activity: 2 (10/22/2024  8:15 AM)  Respiration: 2 (10/22/2024  8:15 AM)  Circulation: 2 (10/22/2024  8:15 AM)  Consciousness: 2 (10/22/2024  8:15 AM)  Oxygen Saturation: 2 (10/22/2024  8:15 AM)  Modified Lucia Score: 10 (10/22/2024  8:15 AM)

## 2024-10-22 NOTE — OP NOTE
OPERATIVE REPORT  PATIENT NAME: Mary Henry    :  1965  MRN: [id number]  Pt Location:  OR ROOM 07    SURGERY DATE: 10/22/2024    Surgeons and Role:     * Mirza Galicia MD - Primary     * Tony Collier PA-C - Assisting    Preop Diagnosis:  Trigger finger, right index finger [M65.321]    Post-Op Diagnosis Codes:     * Trigger finger, right index finger [M65.321]    Procedure(s):  Right - RELEASE TRIGGER FINGER. RIGHT INDEX    Specimen(s):  * No specimens in log *    Estimated Blood Loss:   Minimal    Drains:  * No LDAs found *    Anesthesia Type:   IV Sedation with Anesthesia    Operative Indications:  Trigger finger, right index finger [M65.321]      Operative Findings:  No passive locking or catching after A1 pulley release      Complications:   None    Procedure and Technique:  The patient's right hand was cleansed with alcohol.  A field block was performed with marcaine 0.25% with epinephrine and lidocaine 1% with epinephrine.  The right upper extremity was then prepped and draped in a sterile fashion.  An incision was made in the proximal palmar crease in line with the index finger.  Dissection was performed down to the flexor tendon sheath.  The A1 pulley was identified, and incised with a scalpel.  The release was extended proximally and distally with tenotomy scissors.  Passive ROM was performed with no evidence of locking or catching.  The wound was irrigated with normal saline, and closed with 4-0 nylon sutures in an horizontal mattress interrupted manner.  Xeroform was applied followed by 4x4 gauze.  An ace bandage over wrap was applied.  The patient was transferred to phase 2 recovery in stable condition.       I was present for the entire procedure.    Patient Disposition:  PACU     My Assistant was necessary throughout the procedure(s) for retraction and positioning.    I understand that section 1842 (b)(7)(D) of the Social Security Act generally prohibits Medicare physician fee schedule  payment for the services of assistants-at-surgery in [facility] when qualified residents are available to furnish such services. I certify that the services for which payment is claimed were medically necessary, and that no qualified resident was available to perform the services. I further understand that these services are subject to post-payment review by the Medicare carrier.           SIGNATURE: Mirza Galicia MD  DATE: October 22, 2024  TIME: 7:38 AM

## 2024-10-22 NOTE — INTERVAL H&P NOTE
H&P reviewed. After examining the patient I find no changes in the patients condition since the H&P had been written.    Vitals:    10/22/24 0603   Pulse: (P) 79   Resp: (P) 18   Temp: (P) 97.5 °F (36.4 °C)   SpO2: (P) 97%

## 2024-10-22 NOTE — ANESTHESIA PREPROCEDURE EVALUATION
Procedure:  RELEASE TRIGGER FINGER, RIGHT INDEX (Right: Hand)    Relevant Problems   ANESTHESIA   (+) PONV (postoperative nausea and vomiting)      CARDIO   (+) Other hyperlipidemia      MUSCULOSKELETAL   (+) Osteoarthritis of finger, right      NEURO/PSYCH   (+) Anxiety   (+) Depression, recurrent (HCC)   (+) Major depression, single episode   (+) Numbness and tingling      PULMONARY   (+) Current smoker      Behavioral Health   (+) Attention deficit hyperactivity disorder (ADHD), combined type      Rheumatology   (+) Trigger finger, right index finger      FEN/Gastrointestinal   (+) Irritable bowel syndrome        Physical Exam    Airway    Mallampati score: III  TM Distance: <3 FB  Neck ROM: full     Dental   No notable dental hx     Cardiovascular      Pulmonary   No wheezes    Other Findings  post-pubertal.    Anesthesia Plan  ASA Score- 2     Anesthesia Type- IV sedation with anesthesia with ASA Monitors.         Additional Monitors:     Airway Plan:            Plan Factors-    Chart reviewed. EKG reviewed.  Existing labs reviewed. Patient summary reviewed.    Patient is a current smoker.  Patient smoked on day of surgery.            Induction- intravenous.    Postoperative Plan- Plan for postoperative opioid use.     Perioperative Resuscitation Plan - Level 1 - Full Code.       Informed Consent- Anesthetic plan and risks discussed with patient.    VITALS  LMP 02/01/2021  BP Readings from Last 3 Encounters:   10/04/24 145/96   10/01/24 154/98   09/10/24 120/76        LABS  Results from Last 12 Months   Lab Units 10/01/24  0854 03/23/24  0814   WBC Thousand/uL 8.79 5.92   HEMOGLOBIN g/dL 14.6 13.7   HEMATOCRIT % 44.7 42.4   PLATELETS Thousands/uL 287 306     Results from Last 12 Months   Lab Units 10/01/24  0854 03/23/24  0814   SODIUM mmol/L 138 143   POTASSIUM mmol/L 4.8 4.8   CHLORIDE mmol/L 102 106   CO2 mmol/L 26 30   ANION GAP mmol/L 10 7   BUN mg/dL 17 12   CREATININE mg/dL 0.73 0.63   CALCIUM mg/dL 9.7  "9.0   GLUCOSE RANDOM mg/dL 100  --      No results for input(s): \"APTT\", \"INR\", \"PTT\" in the last 8784 hours.    ECG      ECHOCARDIOGRAPHY OR OTHER TESTING/IMAGING  Encounter Date: 10/01/24   ECG 12 lead   Result Value    Ventricular Rate 67    Atrial Rate 67    LA Interval 158    QRSD Interval 82    QT Interval 394    QTC Interval 416    P Axis 74    QRS Axis 39    T Wave Axis 56    Narrative    Normal sinus rhythm  Left atrial enlargement  Otherwise normal ECG  No previous ECGs available  Confirmed by Moody Peres ([id number]) on 10/1/2024 4:56:45 PM     No results found for this or any previous visit. N/a     ------- ANESTHESIA RISK-BENEFIT DISCUSSION -------  BENEFITS OF A SPECIALIZED ANESTHESIA TEAM INCLUDE (NBK [id number], PMID 51060403):  (1) Reduce mortality and morbidity for major surgeries/procedures. (2) The team provides analgesia/sedation/amnesia/akinesia as safely as possible. (3) The team strives to reduce discomfort as safely as possible.    RISKS, AND PLANS TO MITIGATE RISKS, INCLUDE:    - Neurologic system: IntraOp awareness (Risk is ~1:1,000 - 1:14,000; PMID 44135933), Stroke (Risk ~<0.1-2% for most cases; PMID 48034196), nerve injury, vision loss, and POCD.     - Airway and Pulmonary system: Dental or mouth injury, throat pain, critical hypoxia, pneumothorax, prolonged intubation, post-op respiratory compromise.  Airway/Intubation risks and prior data:  Mask Ventilation: Ventilated by mask (1); Brand: LMA; Size: 4; Insertion Attempts: 1; Placement Verify: Auscultation, End tidal CO2  Major ARISCAT risk factors for pulmonary complications include: Other factors/Clinical gestalt: 1 pt, yielding a score of 0-1= Low risk, 1.6%.  - Cardiovascular system: Hypotension, arrhythmias, cardiac injury or arrest, blood clots, bleeding, infection, vascular injuries.  Kain's RCRI score items: none, yielding an RCRI Score of 0= 0.4% risk of MACE  Are lauren-op or intra-op beta blockers indicated? (PMID 73292008): no  - " FEN/GI system: Aspiration risk (~0.5% per [facility] [id number]) and PONV (10-80% per Apfel score) especially if the patient has not fasted.  ASA NPO guideline compliance?: Yes  - Medication risk assessment: Allergic reactions, excessive bleeding with anticoagulant use, overdoses, drug-drug interactions, injury to a fetus or  in pregnant or breastfeeding patients, lauren-procedural sedation including while driving/operating machinery.  Recent relevant medications:  Vyvance?  Personal or family history of anesthesia complications: yes: PONV  Pregnancy Status: N/A  - Estimate mortality risks associated with anesthesia based on ASA-PS (PMID 17902717): ASA-PS II: risk 1:20,000

## 2024-10-22 NOTE — H&P
Hand Surgery H&P    [id number]  Mary Henry    Assessment     Right index trigger finger        Plan     The patient is a candidate for right index trigger finger release procedure under IV sedation today  She wishes to pursue this option.  Consent on chart.  Pt with a mix of symptoms, but most seems to be from the trigger finger.    Follow-up 2 weeks after surgery.           Subjective      HPI     Patient ID:  Mary Henry is a right hand dominant 58 y.o. female here for follow-up.  She was seen last month and received steroid injection for right index trigger finger.  Today, she states the injection helped for about 3 weeks where she had minimal symptoms however they returned shortly thereafter.  Symptoms are pain and locking MCP region.  She has pain and swelling about the A1 pulley and MCP region.  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  She has no history of surgery to the right index finger.  She does have a history of left hand infection that was washed out in the OR by orthopedic team a few years ago.        The following portions of the patient's history were reviewed and updated as appropriate: allergies, current medications, past family history, past medical history, past social history, past surgical history, and problem list.     Review of Systems      Objective     Imaging:  Right hand x-rays performed in the office today 8/19/2024 demonstrate no significant arthritic changes, no acute findings.     Physical Exam      General appearance:  NAD   Cardiac:  Regular rate  Lungs:  Unlabored breathing  Abdomen:  Non-distended     Orthopedic Examination:  Right index finger     Inspection: No open wounds or erythema.  No ecchymosis.  + MCP swelling/fullness.     Palpation:  + TTP with palpable nodule A1 pulley.  + TTP Dorsal MCP region.     Range-of-motion:  + Crepitus and clicking with ROM.  No active locking.     Strength:  Normal.     Sensation:  ILT     Special Tests:  Good cap  refill at the fingertip.  Palpable radial pulse.

## 2024-10-23 ENCOUNTER — TELEPHONE (OUTPATIENT)
Age: 59
End: 2024-10-23

## 2024-10-23 NOTE — TELEPHONE ENCOUNTER
Caller: Patient     Doctor: Grayson     Reason for call: Patient asked about the pressure bandage and when she should take it off     Call back#: [phone]

## 2024-10-25 NOTE — TELEPHONE ENCOUNTER
Called patient to tell her FMLA forms are finished, left voicemail for patient to call if she wants to  or have us send them.     No

## 2024-10-30 ENCOUNTER — TELEPHONE (OUTPATIENT)
Age: 59
End: 2024-10-30

## 2024-10-30 NOTE — TELEPHONE ENCOUNTER
Caller: Patient    Doctor: Grayson    Reason for call: Employer is asking if they can get a note stating what her restrictions, Such as weight limit, and keep incision clean and dry and free of infection and what light duty restrictions she will have. She does have an appt on Monday 11/4. She does understand you might not be able to until her appt.     If you can please place in my chart     Call back#: [phone]

## 2024-10-30 NOTE — TELEPHONE ENCOUNTER
Yes she has current out of work note until her post-op visit on 11/4.  Further updates will be provided at that time.

## 2024-11-05 ENCOUNTER — OFFICE VISIT (OUTPATIENT)
Dept: OBGYN CLINIC | Facility: CLINIC | Age: 59
End: 2024-11-05

## 2024-11-05 VITALS — WEIGHT: 145 LBS | BODY MASS INDEX: 23.3 KG/M2 | HEIGHT: 66 IN

## 2024-11-05 DIAGNOSIS — M65.321 TRIGGER FINGER, RIGHT INDEX FINGER: Primary | ICD-10-CM

## 2024-11-05 PROCEDURE — 99024 POSTOP FOLLOW-UP VISIT: CPT | Performed by: SURGERY

## 2024-11-05 NOTE — PROGRESS NOTES
HPI:  59F here for first post-op visit.  She is s/p right index trigger finger release 10/22/2024.  Today she states overall she is doing well.  She feels the finger and knuckle still feel full but no locking or clicking.  No issues with the incision.  No numbness and tingling.      PE:  Right index finger: Incision is clean dry intact with scab, central incisional area tissue still healing/filling in.  Normal finger range of motion without clicking or locking.  Sensation intact to light touch.  Good cap refill at the fingertip.  Palpable radial pulse.      A/P:  S/p right index trigger finger release 10/22/2024.  Doing well.  -Sutures removed.  Massage and moisturize the incisional area to soften scar.  -Activities as tolerated, no specific restrictions from our standpoint.  -Finger motion encouraged.  -Work note provided.  -Follow-up 2 weeks for re-evaluation.    Suture removal    Date/Time: 11/5/2024 10:00 AM    Performed by: Tony Collier PA-C  Authorized by: Mirza Galicia MD  Universal Protocol:  Consent: Verbal consent obtained.  Risks and benefits: risks, benefits and alternatives were discussed  Consent given by: patient  Patient identity confirmed: verbally with patient      Patient location:  Clinic  Location:     Laterality:  Right    Location:  Upper extremity    Upper extremity location:  Hand    Hand location:  R index finger  Procedure details:     Tools used:  Suture removal kit    Wound appearance:  No sign(s) of infection, good wound healing and clean  Post-procedure details:     Post-removal:  No dressing applied    Patient tolerance of procedure:  Tolerated well, no immediate complications

## 2024-11-05 NOTE — LETTER
November 5, 2024     Patient: Mary Henry  YOB: 1965  Date of Visit: 11/5/2024      To Whom it May Concern:    Mary Henry is under my professional care. Mary was seen in my office on 11/5/2024. Mary is to remain out of work until next visit in 2 weeks.  Further updates will be provided at that time.      If you have any questions or concerns, please don't hesitate to call.         Sincerely,          Mirza Galicia MD        CC: No Recipients

## 2024-11-14 DIAGNOSIS — M19.041 PRIMARY OSTEOARTHRITIS OF RIGHT HAND: ICD-10-CM

## 2024-11-14 RX ORDER — CELECOXIB 200 MG/1
200 CAPSULE ORAL 2 TIMES DAILY
Qty: 180 CAPSULE | Refills: 1 | Status: SHIPPED | OUTPATIENT
Start: 2024-11-14

## 2024-11-15 DIAGNOSIS — R06.02 SOB (SHORTNESS OF BREATH): ICD-10-CM

## 2024-11-15 DIAGNOSIS — R06.2 WHEEZING: ICD-10-CM

## 2024-11-15 RX ORDER — ALBUTEROL SULFATE 90 UG/1
INHALANT RESPIRATORY (INHALATION)
Qty: 6.7 G | Refills: 2 | Status: SHIPPED | OUTPATIENT
Start: 2024-11-15

## 2024-11-19 ENCOUNTER — OFFICE VISIT (OUTPATIENT)
Dept: OBGYN CLINIC | Facility: CLINIC | Age: 59
End: 2024-11-19

## 2024-11-19 VITALS — HEIGHT: 66 IN | WEIGHT: 145 LBS | BODY MASS INDEX: 23.3 KG/M2

## 2024-11-19 DIAGNOSIS — M65.321 TRIGGER FINGER, RIGHT INDEX FINGER: Primary | ICD-10-CM

## 2024-11-19 PROCEDURE — 99024 POSTOP FOLLOW-UP VISIT: CPT | Performed by: SURGERY

## 2024-11-19 NOTE — LETTER
November 19, 2024     Patient: Mary Henry  YOB: 1965  Date of Visit: 11/19/2024      To Whom it May Concern:    Mary Henry is under my professional care. Mary was seen in my office on 11/19/2024. Mary may return to work on 11/24/2024 with no restrictions .    If you have any questions or concerns, please don't hesitate to call.         Sincerely,          Mirza Galicia MD

## 2024-11-19 NOTE — PROGRESS NOTES
HPI:  59F here for first post-op visit.  She is s/p right index trigger finger release 10/22/2024.    She states he cont to have pain and swelling in her palm and 2nd MCP. She is concerned if this is normal. She is unsure if she can go back to work due to the pain. She denies numbness and no fever or chills      PE:  Right index finger: Incision is well healed with palpable scar noted. Normal finger range of motion without clicking or locking.  Sensation intact to light touch.  Good cap refill at the fingertip.  Palpable radial pulse.      A/P:  S/p right index trigger finger release 10/22/2024.  Doing well.  -Activities as tolerated  -NSAID's as needed. Discussed naproxen can be taken intermittently with Celebrex  -Work note provided.  May adjust as work is tolerated.   -F/U 4 weeks    Procedures

## 2024-12-13 DIAGNOSIS — R06.2 WHEEZING: ICD-10-CM

## 2024-12-13 DIAGNOSIS — R06.02 SOB (SHORTNESS OF BREATH): ICD-10-CM

## 2024-12-13 DIAGNOSIS — G47.00 INSOMNIA, UNSPECIFIED TYPE: ICD-10-CM

## 2024-12-13 DIAGNOSIS — F32.A DEPRESSIVE DISORDER: ICD-10-CM

## 2024-12-13 DIAGNOSIS — F41.9 ANXIETY: ICD-10-CM

## 2024-12-13 RX ORDER — ESCITALOPRAM OXALATE 20 MG/1
20 TABLET ORAL DAILY
Qty: 90 TABLET | Refills: 0 | Status: SHIPPED | OUTPATIENT
Start: 2024-12-13

## 2024-12-13 RX ORDER — ALBUTEROL SULFATE 90 UG/1
1 INHALANT RESPIRATORY (INHALATION) EVERY 4 HOURS PRN
Qty: 6.7 G | Refills: 2 | Status: SHIPPED | OUTPATIENT
Start: 2024-12-13

## 2024-12-16 RX ORDER — ZOLPIDEM TARTRATE 6.25 MG/1
6.25 TABLET, FILM COATED, EXTENDED RELEASE ORAL
Qty: 30 TABLET | Refills: 0 | Status: SHIPPED | OUTPATIENT
Start: 2024-12-16

## 2024-12-18 ENCOUNTER — OFFICE VISIT (OUTPATIENT)
Dept: OBGYN CLINIC | Facility: CLINIC | Age: 59
End: 2024-12-18

## 2024-12-18 VITALS — BODY MASS INDEX: 23.3 KG/M2 | HEIGHT: 66 IN | WEIGHT: 145 LBS

## 2024-12-18 DIAGNOSIS — M65.321 TRIGGER FINGER, RIGHT INDEX FINGER: Primary | ICD-10-CM

## 2024-12-18 PROCEDURE — 99024 POSTOP FOLLOW-UP VISIT: CPT | Performed by: SURGERY

## 2024-12-18 NOTE — PROGRESS NOTES
HPI:  59F here for follow-up.  She is s/p right index trigger finger release 10/22/2024.  She states the right index MCP joint is still sore and painful.  It is mainly described at discomfort.  Overall symptoms better, but still present as described.  No locking.  No numbness/tingling.    PE:  Right index finger: Incision is well healed with palpable scar noted. MCP joint prominent bilaterally.  Normal finger range of motion without clicking or locking.  Sensation intact to light touch.  Good cap refill at the fingertip.  Palpable radial pulse.      A/P:  S/p right index trigger finger release 10/22/2024.  Slow improvement in symptoms.  -Activities as tolerated, no specific restrictions.  -NSAID's as needed. Discussed naproxen can be taken intermittently with Celebrex  -Can also do Voltaren gel to the area.  -Discussed formal therapy as treatment but patient unable to do this right now.  -F/U 2 months for re-evaluation of symptoms.

## 2025-01-30 ENCOUNTER — TELEPHONE (OUTPATIENT)
Dept: OBGYN CLINIC | Facility: HOSPITAL | Age: 60
End: 2025-01-30

## 2025-02-18 ENCOUNTER — OFFICE VISIT (OUTPATIENT)
Dept: OBGYN CLINIC | Facility: CLINIC | Age: 60
End: 2025-02-18
Payer: COMMERCIAL

## 2025-02-18 VITALS — WEIGHT: 145 LBS | BODY MASS INDEX: 23.3 KG/M2 | HEIGHT: 66 IN

## 2025-02-18 DIAGNOSIS — M19.041 PRIMARY OSTEOARTHRITIS OF RIGHT HAND: ICD-10-CM

## 2025-02-18 DIAGNOSIS — F90.2 ATTENTION DEFICIT HYPERACTIVITY DISORDER (ADHD), COMBINED TYPE: ICD-10-CM

## 2025-02-18 DIAGNOSIS — M65.321 TRIGGER FINGER, RIGHT INDEX FINGER: Primary | ICD-10-CM

## 2025-02-18 PROCEDURE — 20600 DRAIN/INJ JOINT/BURSA W/O US: CPT | Performed by: SURGERY

## 2025-02-18 PROCEDURE — 99213 OFFICE O/P EST LOW 20 MIN: CPT | Performed by: SURGERY

## 2025-02-18 RX ORDER — BETAMETHASONE SODIUM PHOSPHATE AND BETAMETHASONE ACETATE 3; 3 MG/ML; MG/ML
3 INJECTION, SUSPENSION INTRA-ARTICULAR; INTRALESIONAL; INTRAMUSCULAR; SOFT TISSUE
Status: COMPLETED | OUTPATIENT
Start: 2025-02-18 | End: 2025-02-18

## 2025-02-18 RX ORDER — LIDOCAINE HYDROCHLORIDE 10 MG/ML
2.5 INJECTION, SOLUTION INFILTRATION; PERINEURAL
Status: COMPLETED | OUTPATIENT
Start: 2025-02-18 | End: 2025-02-18

## 2025-02-18 RX ADMIN — LIDOCAINE HYDROCHLORIDE 2.5 ML: 10 INJECTION, SOLUTION INFILTRATION; PERINEURAL at 08:45

## 2025-02-18 RX ADMIN — BETAMETHASONE SODIUM PHOSPHATE AND BETAMETHASONE ACETATE 3 MG: 3; 3 INJECTION, SUSPENSION INTRA-ARTICULAR; INTRALESIONAL; INTRAMUSCULAR; SOFT TISSUE at 08:45

## 2025-02-18 NOTE — PROGRESS NOTES
HPI:  59F here for follow-up.  She is s/p right index trigger finger release 10/22/2024.      PE:  Right index finger: Incision is well healed with palpable scar noted. MCP joint prominent bilaterally.  Normal finger range of motion without clicking or locking.  Sensation intact to light touch.  Good cap refill at the fingertip.  Palpable radial pulse.      A/P:  S/p right index trigger finger release 10/22/2024.  Slow improvement in symptoms.  -Activities as tolerated, no specific restrictions.  -NSAID's as needed. Discussed naproxen can be taken intermittently with Celebrex  -Can also do Voltaren gel to the area.  -Discussed formal therapy as treatment but patient unable to do this right now.  -F/U 2 months for re-evaluation of symptoms.

## 2025-02-18 NOTE — LETTER
February 18, 2025     Patient: Mary Henry  YOB: 1965  Date of Visit: 2/18/2025      To Whom it May Concern:    Mary Henry is under my professional care. Mary was seen in my office on 2/18/2025. Mary may need to take shifts off as needed due to right hand pain.  May work normal duties otherwise.    If you have any questions or concerns, please don't hesitate to call.         Sincerely,          Mirza Galicia MD        CC: No Recipients

## 2025-02-18 NOTE — PROGRESS NOTES
"HPI:  Pt is a 60 yo female s/p right index trigger finger release on 10/22/24.  Pt also with some underlying mild arthritic changes.  She notes that she has much less discomfort overall, but still has discomfort at her MCP level of her right index finger.  She notes that it can get more sore at times with work activities.      PE:  RUE:  well healed scar in palm, able to make a full fist complex, dorsal index finger MCP prominence, no erythema, no induration, SILT    A/P:  Pt is a 60 yo female s/p right index trigger finger release on 10/22/24.  -Discussed treatment options.  -Recommend right index finger MCP joint injection.  Monitor symptom response.  Pt tolerated well.  NSAIDs as needed for post injection discomfort.  -F/U in 5-6 weeks.  -Cont with work modifications of permitting days off as needed.            Small joint arthrocentesis: R index MCP  Universal Protocol:  procedure performed by consultantConsent: Verbal consent obtained.  Risks and benefits: risks, benefits and alternatives were discussed  Consent given by: patient  Time out: Immediately prior to procedure a \"time out\" was called to verify the correct patient, procedure, equipment, support staff and site/side marked as required.  Timeout called at: 2/18/2025 9:00 AM.  Patient understanding: patient states understanding of the procedure being performed  Site marked: the operative site was marked  Patient identity confirmed: verbally with patient  Supporting Documentation  Indications: pain and joint swelling   Procedure Details  Location: index finger - R index MCP  Preparation: Patient was prepped and draped in the usual sterile fashion  Needle size: 22 G  Ultrasound guidance: no  Approach: dorsal  Medications administered: 2.5 mL lidocaine 1 %; 3 mg betamethasone acetate-betamethasone sodium phosphate 6 (3-3) mg/mL    Patient tolerance: patient tolerated the procedure well with no immediate complications  Dressing:  Sterile dressing " applied

## 2025-02-19 RX ORDER — LISDEXAMFETAMINE DIMESYLATE 30 MG/1
30 CAPSULE ORAL EVERY MORNING
Qty: 90 CAPSULE | Refills: 0 | Status: SHIPPED | OUTPATIENT
Start: 2025-02-19

## 2025-02-19 RX ORDER — LISDEXAMFETAMINE DIMESYLATE 50 MG/1
50 CAPSULE ORAL EVERY MORNING
Qty: 90 CAPSULE | Refills: 0 | Status: SHIPPED | OUTPATIENT
Start: 2025-02-19

## 2025-03-12 DIAGNOSIS — E03.9 ACQUIRED HYPOTHYROIDISM: ICD-10-CM

## 2025-03-12 DIAGNOSIS — F32.A DEPRESSIVE DISORDER: ICD-10-CM

## 2025-03-12 DIAGNOSIS — F41.9 ANXIETY: ICD-10-CM

## 2025-03-12 RX ORDER — ESCITALOPRAM OXALATE 20 MG/1
20 TABLET ORAL DAILY
Qty: 90 TABLET | Refills: 0 | Status: SHIPPED | OUTPATIENT
Start: 2025-03-12

## 2025-03-13 RX ORDER — LEVOTHYROXINE SODIUM 25 UG/1
25 TABLET ORAL
Qty: 90 TABLET | Refills: 0 | Status: SHIPPED | OUTPATIENT
Start: 2025-03-13

## 2025-03-17 ENCOUNTER — OFFICE VISIT (OUTPATIENT)
Dept: INTERNAL MEDICINE CLINIC | Facility: CLINIC | Age: 60
End: 2025-03-17
Payer: COMMERCIAL

## 2025-03-17 VITALS
SYSTOLIC BLOOD PRESSURE: 148 MMHG | OXYGEN SATURATION: 99 % | TEMPERATURE: 97.5 F | DIASTOLIC BLOOD PRESSURE: 88 MMHG | BODY MASS INDEX: 24.78 KG/M2 | HEIGHT: 66 IN | WEIGHT: 154.2 LBS | HEART RATE: 87 BPM

## 2025-03-17 DIAGNOSIS — E55.9 VITAMIN D DEFICIENCY: ICD-10-CM

## 2025-03-17 DIAGNOSIS — F32.2 SEVERE MAJOR DEPRESSIVE DISORDER (HCC): ICD-10-CM

## 2025-03-17 DIAGNOSIS — Z13.0 SCREENING FOR DEFICIENCY ANEMIA: ICD-10-CM

## 2025-03-17 DIAGNOSIS — R11.2 NAUSEA AND VOMITING, UNSPECIFIED VOMITING TYPE: Primary | ICD-10-CM

## 2025-03-17 DIAGNOSIS — F17.210 SMOKING GREATER THAN 20 PACK YEARS: ICD-10-CM

## 2025-03-17 DIAGNOSIS — Z13.220 SCREENING, LIPID: ICD-10-CM

## 2025-03-17 DIAGNOSIS — Z23 ENCOUNTER FOR IMMUNIZATION: ICD-10-CM

## 2025-03-17 DIAGNOSIS — Z13.29 SCREENING FOR THYROID DISORDER: ICD-10-CM

## 2025-03-17 DIAGNOSIS — Z00.00 WELL ADULT EXAM: ICD-10-CM

## 2025-03-17 DIAGNOSIS — I10 PRIMARY HYPERTENSION: ICD-10-CM

## 2025-03-17 DIAGNOSIS — Z12.4 SCREENING FOR CERVICAL CANCER: ICD-10-CM

## 2025-03-17 DIAGNOSIS — Z13.1 SCREENING FOR DIABETES MELLITUS: ICD-10-CM

## 2025-03-17 PROBLEM — R03.0 ELEVATED BP WITHOUT DIAGNOSIS OF HYPERTENSION: Status: RESOLVED | Noted: 2018-10-23 | Resolved: 2025-03-17

## 2025-03-17 PROCEDURE — 99214 OFFICE O/P EST MOD 30 MIN: CPT | Performed by: PHYSICIAN ASSISTANT

## 2025-03-17 PROCEDURE — 99396 PREV VISIT EST AGE 40-64: CPT | Performed by: PHYSICIAN ASSISTANT

## 2025-03-17 RX ORDER — OMEPRAZOLE 40 MG/1
40 CAPSULE, DELAYED RELEASE ORAL DAILY
Qty: 30 CAPSULE | Refills: 2 | Status: SHIPPED | OUTPATIENT
Start: 2025-03-17

## 2025-03-17 RX ORDER — LISINOPRIL 5 MG/1
5 TABLET ORAL DAILY
Qty: 100 TABLET | Refills: 1 | Status: SHIPPED | OUTPATIENT
Start: 2025-03-17

## 2025-03-17 NOTE — ASSESSMENT & PLAN NOTE
Start omeprazole. Encouraged GI consult but pt defers at this time. Labs also ordered  Orders:  •  Lipase; Future  •  omeprazole (PriLOSEC) 40 MG capsule; Take 1 capsule (40 mg total) by mouth daily

## 2025-03-17 NOTE — PROGRESS NOTES
Adult Annual Physical  Name: Mary Henry      : 1965      MRN: [id number]  Encounter Provider: Janine Nix PA-C  Encounter Date: 3/17/2025   Encounter department: [facility]    Assessment & Plan  Screening for cervical cancer         Smoking greater than 20 pack years    Orders:  •  CT lung screening program; Future    Encounter for immunization         Screening for diabetes mellitus    Orders:  •  Comprehensive metabolic panel; Future    Screening for deficiency anemia    Orders:  •  CBC and differential; Future    Screening for thyroid disorder    Orders:  •  TSH, 3rd generation; Future    Screening, lipid    Orders:  •  Lipid panel; Future    Vitamin D deficiency    Orders:  •  Vitamin D 25 hydroxy; Future    Severe major depressive disorder (HCC)  Depression Screening Follow-up Plan: Patient's depression screening was positive with a PHQ-9 score of 14.        Nausea and vomiting, unspecified vomiting type  Start omeprazole. Encouraged GI consult but pt defers at this time. Labs also ordered  Orders:  •  Lipase; Future  •  omeprazole (PriLOSEC) 40 MG capsule; Take 1 capsule (40 mg total) by mouth daily    Primary hypertension  Pt with elevated BP x 2. Start lisinopril. Directions for use and possible side effects discussed and patient verbalized understanding of these.    Orders:  •  lisinopril (ZESTRIL) 5 mg tablet; Take 1 tablet (5 mg total) by mouth daily    Well adult exam         Preventive Screenings:  - Diabetes Screening: screening up-to-date  - Cholesterol Screening: screening not indicated and has hyperlipidemia   - Hepatitis C screening: screening up-to-date   - HIV screening: screening up-to-date   - Breast cancer screening: screening up-to-date   - Lung cancer screening: screening up-to-date     Immunizations:  - Immunizations due: Influenza, Prevnar 20 and Zoster (Shingrix)  - The patient declines recommended vaccines currently despite my recommendations       Counseling/Anticipatory Guidance:      Pt defers GI consult or any CRC Screening at this time. Mammogram current. Due for lung screening CT and labs, orders placed.   She is noting issues with early morning nausea and vomiting since January. Denies chest pain, diarrhea or constipation. BP is elevated today and was elevated at our last visit too. She notes a strong family hx of heart disease as well as increased stressors with her  who is currently battling renal cancer. She is agreeable to starting medication for this.        Depression Screening and Follow-up Plan: Patient's depression screening was positive with a PHQ-9 score of 14.   Patient declines further evaluation by mental health professional and/or medications. Brief counseling provided. Will re-evaluate at next office visit.     Tobacco Cessation Counseling: Tobacco cessation counseling was provided. The patient is sincerely urged to quit consumption of tobacco. She is not ready to quit tobacco.         History of Present Illness     Adult Annual Physical:  Patient presents for annual physical.     Diet and Physical Activity:  - Diet/Nutrition: well balanced diet.  - Exercise: walking.    Depression Screening:    - PHQ-9 Score: 14    General Health:  - Sleep: sleeps well.  - Hearing: normal hearing bilateral ears.  - Vision: goes for regular eye exams.  - Dental: regular dental visits.    /GYN Health:  - Follows with GYN: no.   - History of STDs: no    Advanced Care Planning:  - Has an advanced directive?: no    - Has a durable medical POA?: no    - ACP document given to patient?: no      Review of Systems   Constitutional:  Negative for chills and fever.   HENT:  Negative for congestion, ear pain, hearing loss, postnasal drip, rhinorrhea, sinus pressure, sinus pain, sore throat and trouble swallowing.    Eyes:  Negative for pain and visual disturbance.   Respiratory:  Negative for cough, chest tightness, shortness of breath and wheezing.     Cardiovascular: Negative.  Negative for chest pain, palpitations and leg swelling.   Gastrointestinal:  Negative for abdominal pain, blood in stool, constipation, diarrhea, nausea and vomiting.   Endocrine: Negative for cold intolerance, heat intolerance, polydipsia, polyphagia and polyuria.   Genitourinary:  Negative for difficulty urinating, dysuria, flank pain and urgency.   Musculoskeletal:  Negative for arthralgias, back pain, gait problem and myalgias.   Skin:  Negative for rash.   Allergic/Immunologic: Negative.    Neurological:  Negative for dizziness, weakness, light-headedness and headaches.   Hematological: Negative.    Psychiatric/Behavioral:  Negative for behavioral problems, dysphoric mood and sleep disturbance. The patient is not nervous/anxious.      Current Outpatient Medications on File Prior to Visit   Medication Sig Dispense Refill   • albuterol (PROVENTIL HFA,VENTOLIN HFA) 90 mcg/act inhaler Inhale 1 puff every 4 (four) hours as needed for wheezing 6.7 g 2   • celecoxib (CeleBREX) 200 mg capsule TAKE 1 CAPSULE BY MOUTH TWICE A  capsule 1   • ciclopirox (PENLAC) 8 % solution Apply topically daily at bedtime 6.6 mL 0   • escitalopram (LEXAPRO) 20 mg tablet TAKE 1 TABLET BY MOUTH EVERY DAY 90 tablet 0   • levothyroxine (Euthyrox) 25 mcg tablet Take 1 tablet (25 mcg total) by mouth daily in the early morning 90 tablet 0   • lisdexamfetamine (VYVANSE) 30 MG capsule Take 1 capsule (30 mg total) by mouth every morning Max Daily Amount: 30 mg 90 capsule 0   • lisdexamfetamine (VYVANSE) 50 MG capsule Take 1 capsule (50 mg total) by mouth every morning Max Daily Amount: 50 mg 90 capsule 0   • mupirocin (BACTROBAN) 2 % ointment      • PreviDent 5000 Sensitive 1.1-5 % GEL BRUSH 1 TIME AT NIGHT FOR ONE MIN NOTHING MY MOUTH FOR 30 MIN     • valACYclovir (VALTREX) 1,000 mg tablet Take 1 tablet (1,000 mg total) by mouth 3 (three) times a day for 7 days (Patient taking differently: Take 1,000 mg by  "mouth 3 (three) times a day PRN) 21 tablet 0   • zolpidem (AMBIEN CR) 6.25 MG CR tablet Take 1 tablet (6.25 mg total) by mouth daily at bedtime as needed for sleep 30 tablet 0   • acetaminophen-codeine (TYLENOL with CODEINE #3) 300-30 MG per tablet Take 1 tablet by mouth every 12 (twelve) hours as needed for severe pain (Patient not taking: Reported on 11/5/2024) 5 tablet 0     No current facility-administered medications on file prior to visit.      Social History     Tobacco Use   • Smoking status: Every Day     Current packs/day: 1.00     Average packs/day: 1 pack/day for 45.0 years (45.0 ttl pk-yrs)     Types: Cigarettes     Passive exposure: Current   • Smokeless tobacco: Never   Vaping Use   • Vaping status: Never Used   Substance and Sexual Activity   • Alcohol use: Yes     Comment: Socially Mostly summer parties.   • Drug use: No   • Sexual activity: Yes     Partners: Male     Birth control/protection: Female Sterilization       Objective   /88   Pulse 87   Temp 97.5 °F (36.4 °C)   Ht 5' 6\" (1.676 m)   Wt 69.9 kg (154 lb 3.2 oz)   LMP 02/01/2021   SpO2 99%   BMI 24.89 kg/m²     Physical Exam  Vitals and nursing note reviewed.   Constitutional:       General: She is not in acute distress.     Appearance: Normal appearance. She is well-developed. She is not diaphoretic.   HENT:      Head: Normocephalic and atraumatic.      Right Ear: External ear normal.      Left Ear: External ear normal.      Nose: Nose normal.      Mouth/Throat:      Pharynx: No oropharyngeal exudate.   Eyes:      General: No scleral icterus.        Right eye: No discharge.         Left eye: No discharge.      Conjunctiva/sclera: Conjunctivae normal.      Pupils: Pupils are equal, round, and reactive to light.   Neck:      Thyroid: No thyromegaly.   Cardiovascular:      Rate and Rhythm: Normal rate and regular rhythm.      Heart sounds: Normal heart sounds. No murmur heard.     No friction rub. No gallop.   Pulmonary:      " Effort: Pulmonary effort is normal. No respiratory distress.      Breath sounds: Normal breath sounds. No wheezing or rales.   Abdominal:      General: Bowel sounds are normal. There is no distension.      Palpations: Abdomen is soft.      Tenderness: There is no abdominal tenderness.   Musculoskeletal:         General: No tenderness or deformity. Normal range of motion.      Cervical back: Normal range of motion and neck supple.   Skin:     General: Skin is warm and dry.   Neurological:      Mental Status: She is alert and oriented to person, place, and time.      Cranial Nerves: No cranial nerve deficit.   Psychiatric:         Behavior: Behavior normal.         Thought Content: Thought content normal.         Judgment: Judgment normal.          declines

## 2025-03-17 NOTE — ASSESSMENT & PLAN NOTE
Depression Screening Follow-up Plan: Patient's depression screening was positive with a PHQ-9 score of 14.

## 2025-03-17 NOTE — ASSESSMENT & PLAN NOTE
Pt with elevated BP x 2. Start lisinopril. Directions for use and possible side effects discussed and patient verbalized understanding of these.    Orders:  •  lisinopril (ZESTRIL) 5 mg tablet; Take 1 tablet (5 mg total) by mouth daily

## 2025-03-24 ENCOUNTER — OFFICE VISIT (OUTPATIENT)
Dept: OBGYN CLINIC | Facility: CLINIC | Age: 60
End: 2025-03-24
Payer: COMMERCIAL

## 2025-03-24 DIAGNOSIS — Z98.890 HISTORY OF HAND SURGERY: Primary | ICD-10-CM

## 2025-03-24 DIAGNOSIS — M65.321 TRIGGER FINGER, RIGHT INDEX FINGER: ICD-10-CM

## 2025-03-24 DIAGNOSIS — M19.041 PRIMARY OSTEOARTHRITIS OF RIGHT HAND: ICD-10-CM

## 2025-03-24 PROCEDURE — 99213 OFFICE O/P EST LOW 20 MIN: CPT | Performed by: SURGERY

## 2025-03-31 ENCOUNTER — APPOINTMENT (OUTPATIENT)
Dept: LAB | Facility: HOSPITAL | Age: 60
End: 2025-03-31
Payer: COMMERCIAL

## 2025-03-31 ENCOUNTER — HOSPITAL ENCOUNTER (OUTPATIENT)
Dept: CT IMAGING | Facility: HOSPITAL | Age: 60
Discharge: HOME | End: 2025-03-31
Payer: COMMERCIAL

## 2025-03-31 DIAGNOSIS — E55.9 VITAMIN D DEFICIENCY: ICD-10-CM

## 2025-03-31 DIAGNOSIS — Z13.220 SCREENING, LIPID: ICD-10-CM

## 2025-03-31 DIAGNOSIS — Z13.0 SCREENING FOR DEFICIENCY ANEMIA: ICD-10-CM

## 2025-03-31 DIAGNOSIS — R11.2 NAUSEA AND VOMITING, UNSPECIFIED VOMITING TYPE: ICD-10-CM

## 2025-03-31 DIAGNOSIS — F17.210 SMOKING GREATER THAN 20 PACK YEARS: ICD-10-CM

## 2025-03-31 DIAGNOSIS — Z13.1 SCREENING FOR DIABETES MELLITUS: ICD-10-CM

## 2025-03-31 DIAGNOSIS — Z13.29 SCREENING FOR THYROID DISORDER: ICD-10-CM

## 2025-03-31 LAB
25(OH)D3 SERPL-MCNC: 53.6 NG/ML (ref 30–100)
ALBUMIN SERPL BCG-MCNC: 4.4 G/DL (ref 3.5–5)
ALP SERPL-CCNC: 61 U/L (ref 34–104)
ALT SERPL W P-5'-P-CCNC: 12 U/L (ref 7–52)
ANION GAP SERPL CALCULATED.3IONS-SCNC: 6 MMOL/L (ref 4–13)
AST SERPL W P-5'-P-CCNC: 13 U/L (ref 13–39)
BASOPHILS # BLD AUTO: 0.1 THOUSANDS/ÂΜL (ref 0–0.1)
BASOPHILS NFR BLD AUTO: 2 % (ref 0–1)
BILIRUB SERPL-MCNC: 0.22 MG/DL (ref 0.2–1)
BUN SERPL-MCNC: 25 MG/DL (ref 5–25)
CALCIUM SERPL-MCNC: 9.5 MG/DL (ref 8.4–10.2)
CHLORIDE SERPL-SCNC: 103 MMOL/L (ref 96–108)
CHOLEST SERPL-MCNC: 243 MG/DL (ref ?–200)
CO2 SERPL-SCNC: 30 MMOL/L (ref 21–32)
CREAT SERPL-MCNC: 0.73 MG/DL (ref 0.6–1.3)
EOSINOPHIL # BLD AUTO: 0.3 THOUSAND/ÂΜL (ref 0–0.61)
EOSINOPHIL NFR BLD AUTO: 4 % (ref 0–6)
ERYTHROCYTE [DISTWIDTH] IN BLOOD BY AUTOMATED COUNT: 13.2 % (ref 11.6–15.1)
GFR SERPL CREATININE-BSD FRML MDRD: 91 ML/MIN/1.73SQ M
GLUCOSE P FAST SERPL-MCNC: 95 MG/DL (ref 65–99)
HCT VFR BLD AUTO: 45.7 % (ref 34.8–46.1)
HDLC SERPL-MCNC: 72 MG/DL
HGB BLD-MCNC: 14.7 G/DL (ref 11.5–15.4)
IMM GRANULOCYTES # BLD AUTO: 0.02 THOUSAND/UL (ref 0–0.2)
IMM GRANULOCYTES NFR BLD AUTO: 0 % (ref 0–2)
LDLC SERPL CALC-MCNC: 145 MG/DL (ref 0–100)
LIPASE SERPL-CCNC: 49 U/L (ref 11–82)
LYMPHOCYTES # BLD AUTO: 2.7 THOUSANDS/ÂΜL (ref 0.6–4.47)
LYMPHOCYTES NFR BLD AUTO: 40 % (ref 14–44)
MCH RBC QN AUTO: 29 PG (ref 26.8–34.3)
MCHC RBC AUTO-ENTMCNC: 32.2 G/DL (ref 31.4–37.4)
MCV RBC AUTO: 90 FL (ref 82–98)
MONOCYTES # BLD AUTO: 0.52 THOUSAND/ÂΜL (ref 0.17–1.22)
MONOCYTES NFR BLD AUTO: 8 % (ref 4–12)
NEUTROPHILS # BLD AUTO: 3.11 THOUSANDS/ÂΜL (ref 1.85–7.62)
NEUTS SEG NFR BLD AUTO: 46 % (ref 43–75)
NONHDLC SERPL-MCNC: 171 MG/DL
NRBC BLD AUTO-RTO: 0 /100 WBCS
PLATELET # BLD AUTO: 261 THOUSANDS/UL (ref 149–390)
PMV BLD AUTO: 10.6 FL (ref 8.9–12.7)
POTASSIUM SERPL-SCNC: 5 MMOL/L (ref 3.5–5.3)
PROT SERPL-MCNC: 7 G/DL (ref 6.4–8.4)
RBC # BLD AUTO: 5.07 MILLION/UL (ref 3.81–5.12)
SODIUM SERPL-SCNC: 139 MMOL/L (ref 135–147)
TRIGL SERPL-MCNC: 131 MG/DL (ref ?–150)
TSH SERPL DL<=0.05 MIU/L-ACNC: 5.34 UIU/ML (ref 0.45–4.5)
WBC # BLD AUTO: 6.75 THOUSAND/UL (ref 4.31–10.16)

## 2025-03-31 PROCEDURE — 36415 COLL VENOUS BLD VENIPUNCTURE: CPT

## 2025-03-31 PROCEDURE — 82306 VITAMIN D 25 HYDROXY: CPT

## 2025-03-31 PROCEDURE — 84443 ASSAY THYROID STIM HORMONE: CPT

## 2025-03-31 PROCEDURE — 80061 LIPID PANEL: CPT

## 2025-03-31 PROCEDURE — 85025 COMPLETE CBC W/AUTO DIFF WBC: CPT

## 2025-03-31 PROCEDURE — 83690 ASSAY OF LIPASE: CPT

## 2025-03-31 PROCEDURE — 80053 COMPREHEN METABOLIC PANEL: CPT

## 2025-04-02 ENCOUNTER — RESULTS FOLLOW-UP (OUTPATIENT)
Dept: INTERNAL MEDICINE CLINIC | Facility: CLINIC | Age: 60
End: 2025-04-02

## 2025-04-07 DIAGNOSIS — E03.9 ACQUIRED HYPOTHYROIDISM: ICD-10-CM

## 2025-04-07 RX ORDER — LEVOTHYROXINE SODIUM 50 UG/1
50 TABLET ORAL
Qty: 90 TABLET | Refills: 0 | Status: SHIPPED | OUTPATIENT
Start: 2025-04-07

## 2025-04-07 NOTE — RESULT ENCOUNTER NOTE
Loob Hernandez  I reviewed your labs and your thyroid remains underactive. Im going to increase your levothyroxine to 50mcg daily and I recommend rechecking once you have been on the higher dose for at least a month. I will send the new RX and place the lab order for you. Have a good week  -Janine

## 2025-04-08 DIAGNOSIS — F32.A DEPRESSIVE DISORDER: ICD-10-CM

## 2025-04-08 DIAGNOSIS — R11.2 NAUSEA AND VOMITING, UNSPECIFIED VOMITING TYPE: ICD-10-CM

## 2025-04-08 DIAGNOSIS — F41.9 ANXIETY: ICD-10-CM

## 2025-04-08 RX ORDER — ESCITALOPRAM OXALATE 20 MG/1
20 TABLET ORAL DAILY
Qty: 90 TABLET | Refills: 0 | Status: SHIPPED | OUTPATIENT
Start: 2025-04-08

## 2025-04-09 RX ORDER — OMEPRAZOLE 40 MG/1
40 CAPSULE, DELAYED RELEASE ORAL DAILY
Qty: 90 CAPSULE | Refills: 1 | Status: SHIPPED | OUTPATIENT
Start: 2025-04-09

## 2025-05-04 DIAGNOSIS — E03.9 ACQUIRED HYPOTHYROIDISM: ICD-10-CM

## 2025-05-05 RX ORDER — LEVOTHYROXINE SODIUM 50 UG/1
50 TABLET ORAL
Qty: 90 TABLET | Refills: 1 | Status: SHIPPED | OUTPATIENT
Start: 2025-05-05

## 2025-05-08 DIAGNOSIS — M19.041 PRIMARY OSTEOARTHRITIS OF RIGHT HAND: ICD-10-CM

## 2025-05-08 RX ORDER — CELECOXIB 200 MG/1
200 CAPSULE ORAL 2 TIMES DAILY
Qty: 180 CAPSULE | Refills: 1 | Status: SHIPPED | OUTPATIENT
Start: 2025-05-08

## 2025-06-02 ENCOUNTER — OFFICE VISIT (OUTPATIENT)
Dept: OBGYN CLINIC | Facility: CLINIC | Age: 60
End: 2025-06-02
Payer: COMMERCIAL

## 2025-06-02 VITALS — HEIGHT: 66 IN | WEIGHT: 154 LBS | BODY MASS INDEX: 24.75 KG/M2

## 2025-06-02 DIAGNOSIS — M65.321 TRIGGER FINGER, RIGHT INDEX FINGER: ICD-10-CM

## 2025-06-02 DIAGNOSIS — M19.041 PRIMARY OSTEOARTHRITIS OF RIGHT HAND: ICD-10-CM

## 2025-06-02 DIAGNOSIS — Z98.890 HISTORY OF HAND SURGERY: Primary | ICD-10-CM

## 2025-06-02 PROCEDURE — 20600 DRAIN/INJ JOINT/BURSA W/O US: CPT | Performed by: SURGERY

## 2025-06-02 RX ORDER — BETAMETHASONE SODIUM PHOSPHATE AND BETAMETHASONE ACETATE 3; 3 MG/ML; MG/ML
6 INJECTION, SUSPENSION INTRA-ARTICULAR; INTRALESIONAL; INTRAMUSCULAR; SOFT TISSUE
Status: COMPLETED | OUTPATIENT
Start: 2025-06-02 | End: 2025-06-02

## 2025-06-02 RX ORDER — LIDOCAINE HYDROCHLORIDE 10 MG/ML
3 INJECTION, SOLUTION INFILTRATION; PERINEURAL
Status: COMPLETED | OUTPATIENT
Start: 2025-06-02 | End: 2025-06-02

## 2025-06-02 RX ADMIN — LIDOCAINE HYDROCHLORIDE 3 ML: 10 INJECTION, SOLUTION INFILTRATION; PERINEURAL at 10:15

## 2025-06-02 RX ADMIN — BETAMETHASONE SODIUM PHOSPHATE AND BETAMETHASONE ACETATE 6 MG: 3; 3 INJECTION, SUSPENSION INTRA-ARTICULAR; INTRALESIONAL; INTRAMUSCULAR; SOFT TISSUE at 10:15

## 2025-06-02 NOTE — PROGRESS NOTES
ORTHOPAEDIC HAND, WRIST, AND ELBOW OFFICE  VISIT       ASSESSMENT/PLAN:      59 y.o. year old female who presents with    Assessment & Plan  History of hand surgery  Primary osteoarthritis of right hand  Trigger finger, right index finger  Patient was offered, accepted, and received a cortisone injection of the Right Index MCP today. Patient tolerated procedure well with no immediate complications. Post-injection protocols and expectations were discussed with the patient.   May use NSAIDs/Tylenol for pain control  Continue use of compression glove  Light activity. No heavy lifting, pushing, pulling, or weight bearing  It is recommended they follow up 3 months  Work note provided  We also discussed surgical options of joint replacement but she is not a candidate at this time      Orders:    Small joint arthrocentesis: R index MCP    The patient verbalized understanding of exam findings and treatment plan. We engaged in the shared decision-making process and treatment options were discussed at length with the patient. Surgical and conservative management discussed today along with risks and benefits.      Follow Up:  Return in about 3 months (around 9/2/2025) for Recheck.    To Do Next Visit:  Re-evaluation of current issue    ____________________________________________________________________________________________________________________________________________      CHIEF COMPLAINT:  Chief Complaint   Patient presents with    Right Hand - Follow-up, Locking, Pain, Swelling     RT trigger finger.        SUBJECTIVE:  Mary Henry is a 59 y.o. year old  female who presents for follow up of R lilia  Patient was last seen 3/24/2025 and was advised to continue NSAIDs and compression glove use for hand pain  Since last visit she states she continues to have pain at the R Index MCP. She states she was forced to come to work today so her Index finger MCP is painful. She states little helps but today is not terrible. A  "couple days ago she did have increased pain due to work duties   She is hoping to get the injection. The last one only lasted 2 weeks with pain relief  She is also on Celebrex      I have personally reviewed all the relevant PMH, PSH, SH, FH, Medications and allergies      PAST MEDICAL HISTORY:  Past Medical History[1]    PAST SURGICAL HISTORY:  Past Surgical History[2]    FAMILY HISTORY:  Family History[3]    SOCIAL HISTORY:  Social History[4]    MEDICATIONS:  Current Medications[5]    ALLERGIES:  Allergies[6]        REVIEW OF SYSTEMS:  Review of Systems   Constitutional:  Negative for chills and fever.   HENT:  Negative for ear pain and sore throat.    Eyes:  Negative for pain and visual disturbance.   Respiratory:  Negative for cough and shortness of breath.    Cardiovascular:  Negative for chest pain and palpitations.   Gastrointestinal:  Negative for abdominal pain and vomiting.   Genitourinary:  Negative for dysuria and hematuria.   Musculoskeletal:  Negative for arthralgias and back pain.   Skin:  Negative for color change and rash.   Neurological:  Negative for seizures and syncope.   All other systems reviewed and are negative.      VITALS:  There were no vitals filed for this visit.    LABS:  HgA1c: No results found for: \"HGBA1C\"  BMP:   Lab Results   Component Value Date    CALCIUM 9.5 03/31/2025    K 5.0 03/31/2025    CO2 30 03/31/2025     03/31/2025    BUN 25 03/31/2025    CREATININE 0.73 03/31/2025       _____________________________________________________  PHYSICAL EXAMINATION:  General: well developed and well nourished, alert, oriented times 3, and appears comfortable  Psychiatric: Normal  HEENT: Normocephalic, Atraumatic Trachea Midline, No torticollis  Pulmonary: No audible wheezing or respiratory distress   Abdomen/GI: Non tender, non distended   Cardiovascular: No pitting edema, 2+ radial pulse   Skin: No masses, erythema, lacerations, fluctation, ulcerations  Neurovascular: Sensation " Intact to the Median, Ulnar, Radial Nerve, Motor Intact to the Median, Ulnar, Radial Nerve, and Pulses Intact  Musculoskeletal: Normal, except as noted in detailed exam and in HPI.      MUSCULOSKELETAL EXAMINATION:  Right hand:  SILT  Composite fist    Tender at 2nd MCP    Left hand:  SILT  Composite fist    ___________________________________________________  STUDIES REVIEWED:    No new images obtained/reviewed        PROCEDURES PERFORMED:  Small joint arthrocentesis: R index MCP    Performed by: Mirza Galicia MD  Authorized by: Mirza Galicia MD    Universal Protocol:  Consent: Verbal consent obtained  Risks and benefits: risks, benefits and alternatives were discussed  Consent given by: patient  Timeout called at: 6/2/2025 10:23 AM.  Patient understanding: patient states understanding of the procedure being performed  Patient identity confirmed: verbally with patient  Supporting Documentation  Indications: pain   Procedure Details  Location: index finger - R index MCP  Needle size: 22 G  Ultrasound guidance: no  Approach: volar  Medications administered: 3 mL lidocaine 1 %; 6 mg betamethasone acetate-betamethasone sodium phosphate 6 (3-3) mg/mL    Patient tolerance: patient tolerated the procedure well with no immediate complications  Dressing:  Sterile dressing applied        _____________________________________________________      Scribe Attestation      I,:  Dudley Thompson am acting as a scribe while in the presence of the attending physician.:       I,:  Mirza Galicia MD personally performed the services described in this documentation    as scribed in my presence.:                [1]   Past Medical History:  Diagnosis Date    ADD (attention deficit disorder) 2019    Anxiety 05/31/2013    Arthritis 2022    Gradually worse    Herpes simplex     EXTERNAL    IBS (irritable bowel syndrome)     Insomnia     Jaundice     Malignant melanoma in situ (HCC)     Pneumonia     PONV (postoperative nausea and vomiting)      Reactive airway disease    [2]   Past Surgical History:  Procedure Laterality Date    AUGMENTATION BREAST      AUGMENTATION MAMMAPLASTY Bilateral 28years ago    BUNIONECTOMY Left     KNEE SURGERY      UT ARTHRS KNE SURG W/MENISCECTOMY MED/LAT W/SHVG Right 04/06/2021    Procedure: KNEE ARTHROSCOPY, medial lateral menisectomy,chondroplasty,synovectomy,inj;  Surgeon: Horace Luna DO;  Location:  MAIN OR;  Service: Orthopedics    UT TENDON SHEATH INCISION Right 10/22/2024    Procedure: RELEASE TRIGGER FINGER, RIGHT INDEX;  Surgeon: Mirza Galicia MD;  Location:  MAIN OR;  Service: Orthopedics    SKIN BIOPSY  2013    2 PUNCH BIOPSIES    SKIN LESION EXCISION Right 2013    LEG    TONSILLECTOMY      TUBAL LIGATION      WOUND DEBRIDEMENT Left 10/19/2022    Procedure: DEBRIDEMENT WOUND AND DRESSING CHANGE (WASH OUT);  Surgeon: Anthony Daley MD;  Location: [facility];  Service: Orthopedics   [3]   Family History  Problem Relation Name Age of Onset    Heart disease Father      Diabetes Other CLOSE RELATIVE         MELLITUS    Hyperlipidemia Other CLOSE RELATIVE     ADD / ADHD Mother Mom         She has trouble concentrating and is also depressed.    No Known Problems Maternal Grandmother      No Known Problems Paternal Grandmother      No Known Problems Maternal Aunt fabrice     No Known Problems Maternal Aunt elizabeth     No Known Problems Maternal Aunt lucía    [4]   Social History  Tobacco Use    Smoking status: Every Day     Current packs/day: 1.00     Average packs/day: 1 pack/day for 45.0 years (45.0 ttl pk-yrs)     Types: Cigarettes     Passive exposure: Current    Smokeless tobacco: Never   Vaping Use    Vaping status: Never Used   Substance Use Topics    Alcohol use: Yes     Comment: Socially Mostly summer parties.    Drug use: No   [5]   Current Outpatient Medications:     albuterol (PROVENTIL HFA,VENTOLIN HFA) 90 mcg/act inhaler, Inhale 1 puff every 4 (four) hours as needed for wheezing, Disp: 6.7 g, Rfl: 2     celecoxib (CeleBREX) 200 mg capsule, TAKE 1 CAPSULE BY MOUTH TWICE A DAY, Disp: 180 capsule, Rfl: 1    ciclopirox (PENLAC) 8 % solution, Apply topically daily at bedtime, Disp: 6.6 mL, Rfl: 0    escitalopram (LEXAPRO) 20 mg tablet, TAKE 1 TABLET BY MOUTH EVERY DAY, Disp: 90 tablet, Rfl: 0    levothyroxine (Euthyrox) 50 mcg tablet, Take 1 tablet (50 mcg total) by mouth daily in the early morning, Disp: 90 tablet, Rfl: 1    lisdexamfetamine (VYVANSE) 30 MG capsule, Take 1 capsule (30 mg total) by mouth every morning Max Daily Amount: 30 mg, Disp: 90 capsule, Rfl: 0    lisdexamfetamine (VYVANSE) 50 MG capsule, Take 1 capsule (50 mg total) by mouth every morning Max Daily Amount: 50 mg, Disp: 90 capsule, Rfl: 0    lisinopril (ZESTRIL) 5 mg tablet, Take 1 tablet (5 mg total) by mouth daily, Disp: 100 tablet, Rfl: 1    mupirocin (BACTROBAN) 2 % ointment, , Disp: , Rfl:     omeprazole (PriLOSEC) 40 MG capsule, TAKE 1 CAPSULE (40 MG TOTAL) BY MOUTH DAILY., Disp: 90 capsule, Rfl: 1    PreviDent 5000 Sensitive 1.1-5 % GEL, , Disp: , Rfl:     zolpidem (AMBIEN CR) 6.25 MG CR tablet, Take 1 tablet (6.25 mg total) by mouth daily at bedtime as needed for sleep, Disp: 30 tablet, Rfl: 0    acetaminophen-codeine (TYLENOL with CODEINE #3) 300-30 MG per tablet, Take 1 tablet by mouth every 12 (twelve) hours as needed for severe pain (Patient not taking: Reported on 11/5/2024), Disp: 5 tablet, Rfl: 0    valACYclovir (VALTREX) 1,000 mg tablet, Take 1 tablet (1,000 mg total) by mouth 3 (three) times a day for 7 days (Patient taking differently: Take 1,000 mg by mouth 3 (three) times a day PRN), Disp: 21 tablet, Rfl: 0  [6]   Allergies  Allergen Reactions    Bacitracin Rash    Neomycin Rash    Polymyxin B Rash    Vilazodone Other (See Comments)     Other reaction(s): abdomen pain

## 2025-06-02 NOTE — LETTER
June 2, 2025     Patient: Mary Henry  YOB: 1965  Date of Visit: 6/2/2025      To Whom it May Concern:    Mary Henry is under my professional care. Mary was seen in my office on 6/2/2025. Mary will remain out of work today.    If you have any questions or concerns, please don't hesitate to call.         Sincerely,          Mirza Galicia MD

## 2025-06-02 NOTE — ASSESSMENT & PLAN NOTE
Patient was offered, accepted, and received a cortisone injection of the Right Index MCP today. Patient tolerated procedure well with no immediate complications. Post-injection protocols and expectations were discussed with the patient.   May use NSAIDs/Tylenol for pain control  Continue use of compression glove  Light activity. No heavy lifting, pushing, pulling, or weight bearing  It is recommended they follow up 3 months  Work note provided  We also discussed surgical options of joint replacement but she is not a candidate at this time      Orders:    Small joint arthrocentesis: R index MCP

## 2025-06-11 DIAGNOSIS — E03.9 ACQUIRED HYPOTHYROIDISM: ICD-10-CM

## 2025-06-11 DIAGNOSIS — F90.2 ATTENTION DEFICIT HYPERACTIVITY DISORDER (ADHD), COMBINED TYPE: ICD-10-CM

## 2025-06-11 RX ORDER — LISDEXAMFETAMINE DIMESYLATE 50 MG/1
50 CAPSULE ORAL EVERY MORNING
Qty: 90 CAPSULE | Refills: 0 | Status: SHIPPED | OUTPATIENT
Start: 2025-06-11

## 2025-06-11 RX ORDER — LISDEXAMFETAMINE DIMESYLATE 30 MG/1
30 CAPSULE ORAL EVERY MORNING
Qty: 90 CAPSULE | Refills: 0 | Status: SHIPPED | OUTPATIENT
Start: 2025-06-11

## 2025-06-12 RX ORDER — LEVOTHYROXINE SODIUM 25 UG/1
25 TABLET ORAL
Qty: 90 TABLET | Refills: 3 | OUTPATIENT
Start: 2025-06-12

## 2025-07-14 ENCOUNTER — TELEPHONE (OUTPATIENT)
Age: 60
End: 2025-07-14

## 2025-07-14 DIAGNOSIS — T85.43XA LEAKAGE OF BREAST IMPLANT, INITIAL ENCOUNTER: Primary | ICD-10-CM

## 2025-07-14 NOTE — TELEPHONE ENCOUNTER
Would be willing to be evaluated by another provider?  Saline ruptures can usually be determined easily with deflation occurring in 24-48 hours. Silicone is harder to detect as it is more like a thick gel that will not cause the breast to deflate/just become mishaped. Can she send a photo via [employer]?

## 2025-07-14 NOTE — TELEPHONE ENCOUNTER
Patient states she noticed this morning that her left breast was smaller then the right. She believes her implant that was placed 25 years ago is now leaking. She did confirm it was saline that it was filled with. No pain, fever redness or any other symptoms associated with it. She is unsure if needing to be seen urgently for this as where she got this done the surgeon is no longer in practice.  She can only come into office tomorrow or Friday. Offered appointment on Wednesday but was unable to take. She would like Janine to please review and advise her on what she should do.  Can call her at [phone]

## 2025-07-17 ENCOUNTER — TELEPHONE (OUTPATIENT)
Age: 60
End: 2025-07-17

## 2025-07-17 NOTE — TELEPHONE ENCOUNTER
Called to speak with patient in regards to the referral we received. Unable to LVM as all it said was that patient was not available and then hung up. If pt calls back we need to know if implants were put in cosmetic or due to cancer.

## 2025-07-23 ENCOUNTER — TELEPHONE (OUTPATIENT)
Age: 60
End: 2025-07-23

## 2025-07-23 NOTE — TELEPHONE ENCOUNTER
Patient called to make an appointment she has an ASAP referral for Leakage of breast implant, initial encounter the implants were put in cosmetically and not by us, did advise patient that each visit with us would be $150 patient said she will call her insurance and talk to her doctor tomorrow and call us back.

## 2025-07-24 ENCOUNTER — TELEPHONE (OUTPATIENT)
Age: 60
End: 2025-07-24

## 2025-07-24 ENCOUNTER — OFFICE VISIT (OUTPATIENT)
Dept: INTERNAL MEDICINE CLINIC | Facility: CLINIC | Age: 60
End: 2025-07-24
Payer: COMMERCIAL

## 2025-07-24 ENCOUNTER — APPOINTMENT (OUTPATIENT)
Dept: LAB | Facility: CLINIC | Age: 60
End: 2025-07-24
Attending: PHYSICIAN ASSISTANT
Payer: COMMERCIAL

## 2025-07-24 VITALS
OXYGEN SATURATION: 96 % | BODY MASS INDEX: 24.62 KG/M2 | TEMPERATURE: 97.8 F | HEART RATE: 86 BPM | HEIGHT: 66 IN | DIASTOLIC BLOOD PRESSURE: 88 MMHG | SYSTOLIC BLOOD PRESSURE: 156 MMHG | WEIGHT: 153.2 LBS

## 2025-07-24 DIAGNOSIS — Z12.11 SCREENING FOR COLORECTAL CANCER: ICD-10-CM

## 2025-07-24 DIAGNOSIS — Z12.31 ENCOUNTER FOR SCREENING MAMMOGRAM FOR BREAST CANCER: ICD-10-CM

## 2025-07-24 DIAGNOSIS — E03.9 ACQUIRED HYPOTHYROIDISM: ICD-10-CM

## 2025-07-24 DIAGNOSIS — Z01.419 ENCOUNTER FOR GYNECOLOGICAL EXAMINATION: ICD-10-CM

## 2025-07-24 DIAGNOSIS — Z12.12 SCREENING FOR COLORECTAL CANCER: ICD-10-CM

## 2025-07-24 DIAGNOSIS — Z23 ENCOUNTER FOR IMMUNIZATION: ICD-10-CM

## 2025-07-24 DIAGNOSIS — Z01.419 ENCOUNTER FOR GYNECOLOGICAL EXAMINATION WITHOUT ABNORMAL FINDING: Primary | ICD-10-CM

## 2025-07-24 LAB — TSH SERPL DL<=0.05 MIU/L-ACNC: 0.96 UIU/ML (ref 0.45–4.5)

## 2025-07-24 PROCEDURE — 36415 COLL VENOUS BLD VENIPUNCTURE: CPT

## 2025-07-24 PROCEDURE — 99213 OFFICE O/P EST LOW 20 MIN: CPT | Performed by: PHYSICIAN ASSISTANT

## 2025-07-24 PROCEDURE — 84443 ASSAY THYROID STIM HORMONE: CPT

## 2025-07-24 PROCEDURE — G0145 SCR C/V CYTO,THINLAYER,RESCR: HCPCS | Performed by: PHYSICIAN ASSISTANT

## 2025-07-24 NOTE — TELEPHONE ENCOUNTER
Patient returned call and is scheduled but the only apt I can get her in soon was for 30 min with Dr Cramer. Can you please confirm if that's ok?  Thank you

## 2025-07-24 NOTE — TELEPHONE ENCOUNTER
Returned phone call for patient and lvm once again. If patient calls back, please schedule with Dr. Shoaib SPENCE for an insurance consult for breast pain 30 min.

## 2025-07-24 NOTE — PROGRESS NOTES
Assessment     Healthy female exam.      Plan     Mammogram ordered.  Follow up in: 3 years.            Subjective     Mary Henry is a 59 y.o. female here for a routine exam.  Current complaints: left breast implant leak/rupture.      Gynecologic History  Patient's last menstrual period was 2021.  Menses Regular:no  Contraception: post menopausal status  Last Pap: . Results were: normal  Last mammogram: 3/2024. Results were: norm alnormal  BSE: yes  Last DEXA:N/A. Results were: not examined    Obstetric History  OB History    Para Term  AB Living     0      SAB IAB Ectopic Multiple Live Births       0         The following portions of the patient's history were reviewed and updated as appropriate: allergies, current medications, past family history, past medical history, past social history, past surgical history, and problem list.    Review of Systems  Constitutional: negative  Respiratory: negative  Cardiovascular: negative  Integument/breast: negative  Neurological: negative  Behavioral/Psych: negative      Objective  General Appearance: Alert, appropriate appearance for age. No acute distress, Breast Exam: left breast with ruptured/leaking saline implant, visible size discrepancy, Cardiovascular Exam: Regular rate and rhythm. S1, S2, no murmur, click, gallop, or rubs., Pelvic Exam Female: Vulva and vagina appear normal. Bimanual exam reveals normal uterus and adnexa. Cervix appears normal, Musculoskeletal Exam: Back is straight and non-tender, full ROM of upper and lower extremities., and Skin: no rash or abnormalities

## 2025-07-24 NOTE — TELEPHONE ENCOUNTER
Lvm for patient to schedule consult for breast pain through insurance per Dr. Craemr. Patient should be scheduled asap with Dr. Cramer. Thank you

## 2025-07-28 ENCOUNTER — CONSULT (OUTPATIENT)
Age: 60
End: 2025-07-28
Payer: COMMERCIAL

## 2025-07-28 VITALS
HEART RATE: 86 BPM | BODY MASS INDEX: 24.01 KG/M2 | SYSTOLIC BLOOD PRESSURE: 169 MMHG | WEIGHT: 153 LBS | HEIGHT: 67 IN | DIASTOLIC BLOOD PRESSURE: 100 MMHG | TEMPERATURE: 97.6 F

## 2025-07-28 DIAGNOSIS — T85.43XA LEAKAGE OF BREAST IMPLANT, INITIAL ENCOUNTER: ICD-10-CM

## 2025-07-28 PROCEDURE — 99244 OFF/OP CNSLTJ NEW/EST MOD 40: CPT | Performed by: STUDENT IN AN ORGANIZED HEALTH CARE EDUCATION/TRAINING PROGRAM

## 2025-07-29 LAB
LAB AP GYN PRIMARY INTERPRETATION: NORMAL
Lab: NORMAL

## 2025-08-03 DIAGNOSIS — E03.9 ACQUIRED HYPOTHYROIDISM: ICD-10-CM

## 2025-08-03 DIAGNOSIS — F32.A DEPRESSIVE DISORDER: ICD-10-CM

## 2025-08-03 DIAGNOSIS — F41.9 ANXIETY: ICD-10-CM

## 2025-08-03 DIAGNOSIS — R11.2 NAUSEA AND VOMITING, UNSPECIFIED VOMITING TYPE: ICD-10-CM

## 2025-08-03 DIAGNOSIS — I10 PRIMARY HYPERTENSION: ICD-10-CM

## 2025-08-05 RX ORDER — LEVOTHYROXINE SODIUM 25 UG/1
25 TABLET ORAL
Qty: 90 TABLET | Refills: 3 | OUTPATIENT
Start: 2025-08-05

## 2025-08-05 RX ORDER — OMEPRAZOLE 40 MG/1
40 CAPSULE, DELAYED RELEASE ORAL DAILY
Qty: 90 CAPSULE | Refills: 1 | Status: SHIPPED | OUTPATIENT
Start: 2025-08-05

## 2025-08-05 RX ORDER — ESCITALOPRAM OXALATE 20 MG/1
20 TABLET ORAL DAILY
Qty: 90 TABLET | Refills: 1 | Status: SHIPPED | OUTPATIENT
Start: 2025-08-05

## 2025-08-05 RX ORDER — LISINOPRIL 5 MG/1
5 TABLET ORAL DAILY
Qty: 90 TABLET | Refills: 1 | Status: SHIPPED | OUTPATIENT
Start: 2025-08-05

## 2025-08-07 ENCOUNTER — PREP FOR PROCEDURE (OUTPATIENT)
Dept: PLASTIC SURGERY | Facility: CLINIC | Age: 60
End: 2025-08-07

## 2025-08-07 DIAGNOSIS — Z41.1 ENCOUNTER FOR COSMETIC SURGERY: Primary | ICD-10-CM

## 2025-08-14 ENCOUNTER — TELEPHONE (OUTPATIENT)
Age: 60
End: 2025-08-14

## 2025-08-15 ENCOUNTER — APPOINTMENT (OUTPATIENT)
Dept: LAB | Facility: CLINIC | Age: 60
End: 2025-08-15
Attending: STUDENT IN AN ORGANIZED HEALTH CARE EDUCATION/TRAINING PROGRAM
Payer: COMMERCIAL

## (undated) DEVICE — SUT ETHILON 4-0 PS-2 18 IN 1667H

## (undated) DEVICE — GLOVE INDICATOR UNDERGLOVE SZ 8 GREEN

## (undated) DEVICE — TUBING ARTHROSCOPIC WAVE  MAIN PUMP

## (undated) DEVICE — INTENDED FOR TISSUE SEPARATION, AND OTHER PROCEDURES THAT REQUIRE A SHARP SURGICAL BLADE TO PUNCTURE OR CUT.: Brand: BARD-PARKER ® CARBON RIB-BACK BLADES

## (undated) DEVICE — BULB SYRINGE,IRRIGATION WITH PROTECTIVE CAP: Brand: DOVER

## (undated) DEVICE — GLOVE INDICATOR UNDERGLOVE SZ 7.5 GREEN

## (undated) DEVICE — READY WET SKIN SCRUB TRAY-LF: Brand: MEDLINE INDUSTRIES, INC.

## (undated) DEVICE — SPECIMEN TISSUE TRAP 12MM RIGID

## (undated) DEVICE — BLADE SHAVER EXCALIBUR 4MM 13CM COOLCUT

## (undated) DEVICE — GLOVE SRG BIOGEL 8

## (undated) DEVICE — DRESSING XEROFORM 2 X 2

## (undated) DEVICE — NEEDLE 23G X 1 1/2 SAFETY-GLIDE THIN WALL

## (undated) DEVICE — STERILE POLYISOPRENE POWDER-FREE SURGICAL GLOVES: Brand: PROTEXIS

## (undated) DEVICE — STRAP LITHOTOMY CANDY CANE

## (undated) DEVICE — Device

## (undated) DEVICE — ACE WRAP 6 IN STERILE

## (undated) DEVICE — STERILE POLYISOPRENE POWDER-FREE SURGICAL GLOVES WITH EMOLLIENT COATING: Brand: PROTEXIS

## (undated) DEVICE — STOCKINETTE,IMPERVIOUS,12X48,STERILE: Brand: MEDLINE

## (undated) DEVICE — PAD CAST 6 IN COTTON NON STERILE

## (undated) DEVICE — SYRINGE 5ML LL

## (undated) DEVICE — POOLE SUCTION INSTRUMENT WITH REMOVABLE SHEATH AND PREATTACHED 6' (1.8 M) CLEAR PLASTIC TUBING: Brand: POOLE

## (undated) DEVICE — SYRINGE 10ML LL

## (undated) DEVICE — NEEDLE 18 G X 1 1/2 SAFETY

## (undated) DEVICE — OCCLUSIVE GAUZE STRIP,3% BISMUTH TRIBROMOPHENATE IN PETROLATUM BLEND: Brand: XEROFORM

## (undated) DEVICE — CULTURE TUBE ANAEROBIC

## (undated) DEVICE — PREP SURGICAL PURPREP 26ML

## (undated) DEVICE — BETHLEHEM UNIVERSAL  ARTHRO PK: Brand: CARDINAL HEALTH

## (undated) DEVICE — 3M™ STERI-DRAPE™ U-DRAPE 1015: Brand: STERI-DRAPE™

## (undated) DEVICE — 4-PORT MANIFOLD: Brand: NEPTUNE 2

## (undated) DEVICE — TUBING SMOKE EVAC W/FILTRATION DEVICE PLUMEPORT

## (undated) DEVICE — ACE WRAP 3 IN UNSTERILE

## (undated) DEVICE — INTENDED FOR TISSUE SEPARATION, AND OTHER PROCEDURES THAT REQUIRE A SHARP SURGICAL BLADE TO PUNCTURE OR CUT.: Brand: BARD-PARKER ® SAFETYLOCK CARBON RIB-BACK BLADES

## (undated) DEVICE — SPLINT 4 X 15 IN FAST SET PLASTER

## (undated) DEVICE — CONMED SUCTION CONNECTING TUBING, 20' LONG (6.1 M), 9/32" I.D. (7.1 MM): Brand: CONMED

## (undated) DEVICE — SUT ETHILON 3-0 PS-1 18 IN 1663H

## (undated) DEVICE — GLOVE SRG BIOGEL 7.5

## (undated) DEVICE — PADDING CAST 6IN COTTON STRL

## (undated) DEVICE — TOWEL SURG XR DETECT GREEN STRL RFD

## (undated) DEVICE — LIGHT GLOVE GREEN

## (undated) DEVICE — GAUZE SPONGES,16 PLY: Brand: CURITY

## (undated) DEVICE — CULTURE TUBE AEROBIC

## (undated) DEVICE — NEEDLE 21 G X 1 1/2 SAFETY

## (undated) DEVICE — AMBIENT COVAC 50 IFS: Brand: COBLATION

## (undated) DEVICE — TIBURON SPLIT SHEET: Brand: CONVERTORS

## (undated) DEVICE — DISPOSABLE OR TOWEL: Brand: CARDINAL HEALTH

## (undated) DEVICE — STERILE BETHLEHEM PLASTIC HAND: Brand: CARDINAL HEALTH

## (undated) DEVICE — COBAN 6 IN STERILE

## (undated) DEVICE — NEEDLE BLUNT 18 G X 1 1/2IN

## (undated) DEVICE — GLOVE INDICATOR PI UNDERGLOVE SZ 8 BLUE